# Patient Record
Sex: MALE | Race: WHITE | Employment: UNEMPLOYED | URBAN - METROPOLITAN AREA
[De-identification: names, ages, dates, MRNs, and addresses within clinical notes are randomized per-mention and may not be internally consistent; named-entity substitution may affect disease eponyms.]

---

## 2017-02-10 ENCOUNTER — HOSPITAL ENCOUNTER (EMERGENCY)
Age: 42
Discharge: HOME | End: 2017-02-11
Attending: EMERGENCY MEDICINE
Payer: MEDICARE

## 2017-02-10 DIAGNOSIS — F14.10 COCAINE ABUSE (HCC): ICD-10-CM

## 2017-02-10 DIAGNOSIS — F10.920 ACUTE ALCOHOLIC INTOXICATION, UNCOMPLICATED (HCC): Primary | ICD-10-CM

## 2017-02-10 LAB
ABSOLUTE EOS #: 0.1 K/UL (ref 0–0.4)
ABSOLUTE LYMPH #: 2.8 K/UL (ref 1–4.8)
ABSOLUTE MONO #: 0.8 K/UL (ref 0.1–1.3)
ACETAMINOPHEN LEVEL: <10 UG/ML (ref 10–30)
ALBUMIN SERPL-MCNC: 4.6 G/DL (ref 3.5–5.2)
ALBUMIN/GLOBULIN RATIO: ABNORMAL (ref 1–2.5)
ALP BLD-CCNC: 99 U/L (ref 40–129)
ALT SERPL-CCNC: 22 U/L (ref 5–41)
AMPHETAMINE SCREEN URINE: NEGATIVE
ANION GAP SERPL CALCULATED.3IONS-SCNC: 16 MMOL/L (ref 9–17)
AST SERPL-CCNC: 50 U/L
BARBITURATE SCREEN URINE: NEGATIVE
BASOPHILS # BLD: 1 % (ref 0–2)
BASOPHILS ABSOLUTE: 0.1 K/UL (ref 0–0.2)
BENZODIAZEPINE SCREEN, URINE: NEGATIVE
BILIRUB SERPL-MCNC: 0.34 MG/DL (ref 0.3–1.2)
BUN BLDV-MCNC: 8 MG/DL (ref 6–20)
BUN/CREAT BLD: ABNORMAL (ref 9–20)
BUPRENORPHINE URINE: ABNORMAL
CALCIUM SERPL-MCNC: 9.2 MG/DL (ref 8.6–10.4)
CANNABINOID SCREEN URINE: POSITIVE
CHLORIDE BLD-SCNC: 101 MMOL/L (ref 98–107)
CO2: 22 MMOL/L (ref 20–31)
COCAINE METABOLITE, URINE: POSITIVE
CREAT SERPL-MCNC: 0.74 MG/DL (ref 0.7–1.2)
DIFFERENTIAL TYPE: ABNORMAL
EOSINOPHILS RELATIVE PERCENT: 1 % (ref 0–4)
ETHANOL PERCENT: 0.29 %
ETHANOL: 290 MG/DL
GFR AFRICAN AMERICAN: >60 ML/MIN
GFR NON-AFRICAN AMERICAN: >60 ML/MIN
GFR SERPL CREATININE-BSD FRML MDRD: ABNORMAL ML/MIN/{1.73_M2}
GFR SERPL CREATININE-BSD FRML MDRD: ABNORMAL ML/MIN/{1.73_M2}
GLUCOSE BLD-MCNC: 122 MG/DL (ref 70–99)
HCT VFR BLD CALC: 46.7 % (ref 41–53)
HEMOGLOBIN: 15.9 G/DL (ref 13.5–17.5)
LYMPHOCYTES # BLD: 32 % (ref 24–44)
MCH RBC QN AUTO: 29.1 PG (ref 26–34)
MCHC RBC AUTO-ENTMCNC: 34.1 G/DL (ref 31–37)
MCV RBC AUTO: 85.5 FL (ref 80–100)
MDMA URINE: ABNORMAL
METHADONE SCREEN, URINE: NEGATIVE
METHAMPHETAMINE, URINE: ABNORMAL
MONOCYTES # BLD: 9 % (ref 1–7)
OPIATES, URINE: NEGATIVE
OXYCODONE SCREEN URINE: NEGATIVE
PDW BLD-RTO: 14.8 % (ref 11.5–14.9)
PHENCYCLIDINE, URINE: NEGATIVE
PLATELET # BLD: 188 K/UL (ref 150–450)
PLATELET ESTIMATE: ABNORMAL
PMV BLD AUTO: 8.5 FL (ref 6–12)
POTASSIUM SERPL-SCNC: 4.2 MMOL/L (ref 3.7–5.3)
PROPOXYPHENE, URINE: ABNORMAL
RBC # BLD: 5.46 M/UL (ref 4.5–5.9)
RBC # BLD: ABNORMAL 10*6/UL
SALICYLATE LEVEL: <1 MG/DL (ref 3–10)
SEG NEUTROPHILS: 57 % (ref 36–66)
SEGMENTED NEUTROPHILS ABSOLUTE COUNT: 5 K/UL (ref 1.3–9.1)
SODIUM BLD-SCNC: 139 MMOL/L (ref 135–144)
TEST INFORMATION: ABNORMAL
TOTAL PROTEIN: 8.5 G/DL (ref 6.4–8.3)
TRICYCLIC ANTIDEPRESSANTS, UR: ABNORMAL
WBC # BLD: 8.8 K/UL (ref 3.5–11)
WBC # BLD: ABNORMAL 10*3/UL

## 2017-02-10 PROCEDURE — 80053 COMPREHEN METABOLIC PANEL: CPT

## 2017-02-10 PROCEDURE — 99285 EMERGENCY DEPT VISIT HI MDM: CPT

## 2017-02-10 PROCEDURE — G0480 DRUG TEST DEF 1-7 CLASSES: HCPCS

## 2017-02-10 PROCEDURE — 80307 DRUG TEST PRSMV CHEM ANLYZR: CPT

## 2017-02-10 PROCEDURE — 36415 COLL VENOUS BLD VENIPUNCTURE: CPT

## 2017-02-10 PROCEDURE — 85025 COMPLETE CBC W/AUTO DIFF WBC: CPT

## 2017-02-10 RX ORDER — LORAZEPAM 1 MG/1
1 TABLET ORAL ONCE
Status: DISCONTINUED | OUTPATIENT
Start: 2017-02-10 | End: 2017-02-11 | Stop reason: HOSPADM

## 2017-02-10 ASSESSMENT — ENCOUNTER SYMPTOMS
ABDOMINAL PAIN: 0
GASTROINTESTINAL NEGATIVE: 1
EYES NEGATIVE: 1
RESPIRATORY NEGATIVE: 1
COUGH: 0
BACK PAIN: 0
SHORTNESS OF BREATH: 0

## 2017-02-11 VITALS
DIASTOLIC BLOOD PRESSURE: 90 MMHG | BODY MASS INDEX: 29.43 KG/M2 | TEMPERATURE: 97.8 F | HEART RATE: 96 BPM | RESPIRATION RATE: 16 BRPM | SYSTOLIC BLOOD PRESSURE: 140 MMHG | OXYGEN SATURATION: 96 % | WEIGHT: 211 LBS

## 2017-02-11 ASSESSMENT — SLEEP AND FATIGUE QUESTIONNAIRES
DIFFICULTY STAYING ASLEEP: YES
AVERAGE NUMBER OF SLEEP HOURS: 2
SLEEP PATTERN: DIFFICULTY FALLING ASLEEP;DISTURBED/INTERRUPTED SLEEP;INSOMNIA;RESTLESSNESS;EARLY AWAKENING
DIFFICULTY ARISING: NO
DO YOU USE A SLEEP AID: NO
DO YOU HAVE DIFFICULTY SLEEPING: YES
RESTFUL SLEEP: NO
DIFFICULTY FALLING ASLEEP: YES

## 2017-02-11 ASSESSMENT — LIFESTYLE VARIABLES: HISTORY_ALCOHOL_USE: YES

## 2017-03-07 ENCOUNTER — HOSPITAL ENCOUNTER (OUTPATIENT)
Age: 42
Discharge: HOME | End: 2017-03-07
Payer: MEDICARE

## 2017-03-07 ENCOUNTER — OFFICE VISIT (OUTPATIENT)
Dept: FAMILY MEDICINE CLINIC | Facility: CLINIC | Age: 42
End: 2017-03-07

## 2017-03-07 VITALS
DIASTOLIC BLOOD PRESSURE: 84 MMHG | BODY MASS INDEX: 31.05 KG/M2 | WEIGHT: 221.8 LBS | TEMPERATURE: 98.8 F | SYSTOLIC BLOOD PRESSURE: 124 MMHG | HEART RATE: 97 BPM | HEIGHT: 71 IN

## 2017-03-07 DIAGNOSIS — K21.9 GASTROESOPHAGEAL REFLUX DISEASE, ESOPHAGITIS PRESENCE NOT SPECIFIED: ICD-10-CM

## 2017-03-07 DIAGNOSIS — Z00.00 HEALTHY ADULT ON ROUTINE PHYSICAL EXAMINATION: ICD-10-CM

## 2017-03-07 DIAGNOSIS — F31.4 BIPOLAR 1 DISORDER, DEPRESSED, SEVERE (HCC): Primary | Chronic | ICD-10-CM

## 2017-03-07 DIAGNOSIS — F10.10 ALCOHOL ABUSE: ICD-10-CM

## 2017-03-07 DIAGNOSIS — G25.81 RESTLESS LEG SYNDROME: ICD-10-CM

## 2017-03-07 DIAGNOSIS — F33.2 SEVERE EPISODE OF RECURRENT MAJOR DEPRESSIVE DISORDER, WITHOUT PSYCHOTIC FEATURES (HCC): Chronic | ICD-10-CM

## 2017-03-07 DIAGNOSIS — E53.8 FOLATE DEFICIENCY: ICD-10-CM

## 2017-03-07 PROCEDURE — 99203 OFFICE O/P NEW LOW 30 MIN: CPT | Performed by: FAMILY MEDICINE

## 2017-03-07 PROCEDURE — 99214 OFFICE O/P EST MOD 30 MIN: CPT | Performed by: FAMILY MEDICINE

## 2017-03-07 RX ORDER — OMEPRAZOLE 20 MG/1
20 CAPSULE, DELAYED RELEASE ORAL DAILY
Qty: 30 CAPSULE | Refills: 0 | Status: SHIPPED | OUTPATIENT
Start: 2017-03-07 | End: 2017-09-12 | Stop reason: SDUPTHER

## 2017-03-07 RX ORDER — ROPINIROLE 1 MG/1
1 TABLET, FILM COATED ORAL NIGHTLY
Qty: 30 TABLET | Refills: 3 | Status: SHIPPED | OUTPATIENT
Start: 2017-03-07 | End: 2017-08-02 | Stop reason: SDUPTHER

## 2017-03-07 ASSESSMENT — ENCOUNTER SYMPTOMS
RESPIRATORY NEGATIVE: 1
EYES NEGATIVE: 1

## 2017-04-10 ENCOUNTER — HOSPITAL ENCOUNTER (OUTPATIENT)
Age: 42
Setting detail: SPECIMEN
Discharge: HOME | End: 2017-04-10
Payer: MEDICARE

## 2017-04-10 DIAGNOSIS — F10.10 ALCOHOL ABUSE: ICD-10-CM

## 2017-04-10 DIAGNOSIS — E53.8 FOLATE DEFICIENCY: ICD-10-CM

## 2017-04-10 DIAGNOSIS — F33.2 SEVERE EPISODE OF RECURRENT MAJOR DEPRESSIVE DISORDER, WITHOUT PSYCHOTIC FEATURES (HCC): Chronic | ICD-10-CM

## 2017-04-10 DIAGNOSIS — Z00.00 HEALTHY ADULT ON ROUTINE PHYSICAL EXAMINATION: ICD-10-CM

## 2017-04-10 LAB
ALBUMIN SERPL-MCNC: 4.2 G/DL (ref 3.5–5.2)
ALBUMIN/GLOBULIN RATIO: 1.1 (ref 1–2.5)
ALP BLD-CCNC: 107 U/L (ref 40–129)
ALT SERPL-CCNC: 13 U/L (ref 5–41)
ANION GAP SERPL CALCULATED.3IONS-SCNC: 16 MMOL/L (ref 9–17)
AST SERPL-CCNC: 23 U/L
BILIRUB SERPL-MCNC: 0.54 MG/DL (ref 0.3–1.2)
BUN BLDV-MCNC: 9 MG/DL (ref 6–20)
BUN/CREAT BLD: NORMAL (ref 9–20)
CALCIUM SERPL-MCNC: 8.9 MG/DL (ref 8.6–10.4)
CHLORIDE BLD-SCNC: 101 MMOL/L (ref 98–107)
CHOLESTEROL/HDL RATIO: 5.9
CHOLESTEROL: 213 MG/DL
CO2: 24 MMOL/L (ref 20–31)
CREAT SERPL-MCNC: 0.83 MG/DL (ref 0.7–1.2)
GFR AFRICAN AMERICAN: >60 ML/MIN
GFR NON-AFRICAN AMERICAN: >60 ML/MIN
GFR SERPL CREATININE-BSD FRML MDRD: NORMAL ML/MIN/{1.73_M2}
GFR SERPL CREATININE-BSD FRML MDRD: NORMAL ML/MIN/{1.73_M2}
GLUCOSE BLD-MCNC: 76 MG/DL (ref 70–99)
HCT VFR BLD CALC: 45.6 % (ref 41–53)
HDLC SERPL-MCNC: 36 MG/DL
HEMOGLOBIN: 15.2 G/DL (ref 13.5–17.5)
LDL CHOLESTEROL: 115 MG/DL (ref 0–130)
MCH RBC QN AUTO: 27.6 PG (ref 26–34)
MCHC RBC AUTO-ENTMCNC: 33.3 G/DL (ref 31–37)
MCV RBC AUTO: 82.7 FL (ref 80–100)
PDW BLD-RTO: 14.5 % (ref 12.5–15.4)
PLATELET # BLD: 216 K/UL (ref 140–450)
PMV BLD AUTO: 9.6 FL (ref 6–12)
POTASSIUM SERPL-SCNC: 4.5 MMOL/L (ref 3.7–5.3)
RBC # BLD: 5.51 M/UL (ref 4.5–5.9)
SODIUM BLD-SCNC: 141 MMOL/L (ref 135–144)
TOTAL PROTEIN: 7.9 G/DL (ref 6.4–8.3)
TRIGL SERPL-MCNC: 309 MG/DL
VLDLC SERPL CALC-MCNC: ABNORMAL MG/DL (ref 1–30)
WBC # BLD: 8.3 K/UL (ref 3.5–11)

## 2017-04-11 LAB
FOLATE: 10.2 NG/ML
VITAMIN B-12: 423 PG/ML (ref 211–946)

## 2017-04-12 ENCOUNTER — TELEPHONE (OUTPATIENT)
Dept: FAMILY MEDICINE CLINIC | Age: 42
End: 2017-04-12

## 2017-06-26 ENCOUNTER — OFFICE VISIT (OUTPATIENT)
Dept: FAMILY MEDICINE CLINIC | Age: 42
End: 2017-06-26
Payer: MEDICARE

## 2017-06-26 VITALS
BODY MASS INDEX: 28.28 KG/M2 | SYSTOLIC BLOOD PRESSURE: 130 MMHG | TEMPERATURE: 97.7 F | DIASTOLIC BLOOD PRESSURE: 88 MMHG | WEIGHT: 202.8 LBS | HEART RATE: 91 BPM

## 2017-06-26 DIAGNOSIS — F31.4 BIPOLAR 1 DISORDER, DEPRESSED, SEVERE (HCC): Chronic | ICD-10-CM

## 2017-06-26 DIAGNOSIS — F10.10 ALCOHOL ABUSE: ICD-10-CM

## 2017-06-26 DIAGNOSIS — R11.2 INTRACTABLE VOMITING WITH NAUSEA, UNSPECIFIED VOMITING TYPE: Primary | ICD-10-CM

## 2017-06-26 PROCEDURE — 99213 OFFICE O/P EST LOW 20 MIN: CPT | Performed by: STUDENT IN AN ORGANIZED HEALTH CARE EDUCATION/TRAINING PROGRAM

## 2017-06-26 RX ORDER — M-VIT,TX,IRON,MINS/CALC/FOLIC 27MG-0.4MG
1 TABLET ORAL DAILY
Qty: 30 TABLET | Refills: 0 | Status: SHIPPED | OUTPATIENT
Start: 2017-06-26 | End: 2017-09-26

## 2017-06-26 RX ORDER — LANOLIN ALCOHOL/MO/W.PET/CERES
100 CREAM (GRAM) TOPICAL DAILY
Qty: 30 TABLET | Refills: 0 | Status: SHIPPED | OUTPATIENT
Start: 2017-06-26 | End: 2017-09-26

## 2017-06-26 RX ORDER — FOLIC ACID 1 MG/1
1 TABLET ORAL DAILY
Qty: 30 TABLET | Refills: 0 | Status: SHIPPED | OUTPATIENT
Start: 2017-06-26 | End: 2017-09-26

## 2017-06-26 RX ORDER — ONDANSETRON 4 MG/1
4 TABLET, ORALLY DISINTEGRATING ORAL EVERY 8 HOURS PRN
Qty: 21 TABLET | Refills: 1 | Status: SHIPPED | OUTPATIENT
Start: 2017-06-26 | End: 2017-09-26

## 2017-06-26 ASSESSMENT — ENCOUNTER SYMPTOMS
DIARRHEA: 1
NAUSEA: 1
ABDOMINAL PAIN: 1
PHOTOPHOBIA: 0
VOMITING: 1
SHORTNESS OF BREATH: 0
WHEEZING: 0

## 2017-07-11 ENCOUNTER — OFFICE VISIT (OUTPATIENT)
Dept: FAMILY MEDICINE CLINIC | Age: 42
End: 2017-07-11
Payer: MEDICARE

## 2017-07-11 VITALS
BODY MASS INDEX: 28.11 KG/M2 | SYSTOLIC BLOOD PRESSURE: 170 MMHG | HEART RATE: 91 BPM | HEIGHT: 71 IN | DIASTOLIC BLOOD PRESSURE: 102 MMHG | WEIGHT: 200.8 LBS | TEMPERATURE: 97.9 F

## 2017-07-11 DIAGNOSIS — A60.00 RECURRENT GENITAL HERPES: ICD-10-CM

## 2017-07-11 DIAGNOSIS — A60.02 HERPES SIMPLEX INFECTION OF OTHER SITE OF MALE GENITAL ORGAN: Primary | ICD-10-CM

## 2017-07-11 PROCEDURE — 99212 OFFICE O/P EST SF 10 MIN: CPT | Performed by: STUDENT IN AN ORGANIZED HEALTH CARE EDUCATION/TRAINING PROGRAM

## 2017-07-11 RX ORDER — VALACYCLOVIR HYDROCHLORIDE 500 MG/1
500 TABLET, FILM COATED ORAL 2 TIMES DAILY
Qty: 6 TABLET | Refills: 1 | Status: SHIPPED | OUTPATIENT
Start: 2017-07-11 | End: 2017-07-14

## 2017-07-11 RX ORDER — VALACYCLOVIR HYDROCHLORIDE 1 G/1
1000 TABLET, FILM COATED ORAL DAILY
Qty: 30 TABLET | Refills: 5 | Status: SHIPPED | OUTPATIENT
Start: 2017-07-11 | End: 2018-03-20 | Stop reason: SDUPTHER

## 2017-08-02 DIAGNOSIS — G25.81 RESTLESS LEG SYNDROME: ICD-10-CM

## 2017-08-07 RX ORDER — ROPINIROLE 1 MG/1
TABLET, FILM COATED ORAL
Qty: 30 TABLET | Refills: 3 | Status: SHIPPED | OUTPATIENT
Start: 2017-08-07 | End: 2017-12-16 | Stop reason: SDUPTHER

## 2017-09-05 ENCOUNTER — APPOINTMENT (OUTPATIENT)
Dept: GENERAL RADIOLOGY | Age: 42
DRG: 315 | End: 2017-09-05
Payer: MEDICARE

## 2017-09-05 ENCOUNTER — HOSPITAL ENCOUNTER (EMERGENCY)
Age: 42
Discharge: HOME | DRG: 315 | End: 2017-09-05
Attending: EMERGENCY MEDICINE
Payer: MEDICARE

## 2017-09-05 VITALS
TEMPERATURE: 97.6 F | DIASTOLIC BLOOD PRESSURE: 88 MMHG | HEART RATE: 88 BPM | RESPIRATION RATE: 18 BRPM | SYSTOLIC BLOOD PRESSURE: 152 MMHG | WEIGHT: 198.41 LBS | BODY MASS INDEX: 27.78 KG/M2 | OXYGEN SATURATION: 98 % | HEIGHT: 71 IN

## 2017-09-05 DIAGNOSIS — M71.122 SEPTIC BURSITIS OF ELBOW, LEFT: Primary | ICD-10-CM

## 2017-09-05 LAB
ABSOLUTE EOS #: 0.1 K/UL (ref 0–0.4)
ABSOLUTE LYMPH #: 2.2 K/UL (ref 1–4.8)
ABSOLUTE MONO #: 1.4 K/UL (ref 0.2–0.8)
ANION GAP SERPL CALCULATED.3IONS-SCNC: 17 MMOL/L (ref 9–17)
BASOPHILS # BLD: 1 %
BASOPHILS ABSOLUTE: 0.1 K/UL (ref 0–0.2)
BUN BLDV-MCNC: 9 MG/DL (ref 6–20)
BUN/CREAT BLD: 15 (ref 9–20)
CALCIUM SERPL-MCNC: 8.8 MG/DL (ref 8.6–10.4)
CHLORIDE BLD-SCNC: 97 MMOL/L (ref 98–107)
CO2: 22 MMOL/L (ref 20–31)
CREAT SERPL-MCNC: 0.62 MG/DL (ref 0.7–1.2)
DIFFERENTIAL TYPE: ABNORMAL
EOSINOPHILS RELATIVE PERCENT: 1 %
GFR AFRICAN AMERICAN: >60 ML/MIN
GFR NON-AFRICAN AMERICAN: >60 ML/MIN
GFR SERPL CREATININE-BSD FRML MDRD: ABNORMAL ML/MIN/{1.73_M2}
GFR SERPL CREATININE-BSD FRML MDRD: ABNORMAL ML/MIN/{1.73_M2}
GLUCOSE BLD-MCNC: 136 MG/DL (ref 70–99)
HCT VFR BLD CALC: 42.1 % (ref 41–53)
HEMOGLOBIN: 14 G/DL (ref 13.5–17.5)
LYMPHOCYTES # BLD: 21 %
MCH RBC QN AUTO: 29.2 PG (ref 26–34)
MCHC RBC AUTO-ENTMCNC: 33.2 G/DL (ref 31–37)
MCV RBC AUTO: 87.9 FL (ref 80–100)
MONOCYTES # BLD: 14 %
PDW BLD-RTO: 15.6 % (ref 11.5–14.5)
PLATELET # BLD: 192 K/UL (ref 130–400)
PLATELET ESTIMATE: ABNORMAL
PMV BLD AUTO: 9.2 FL (ref 6–12)
POTASSIUM SERPL-SCNC: 4.6 MMOL/L (ref 3.7–5.3)
RBC # BLD: 4.78 M/UL (ref 4.5–5.9)
RBC # BLD: ABNORMAL 10*6/UL
SEG NEUTROPHILS: 63 %
SEGMENTED NEUTROPHILS ABSOLUTE COUNT: 6.6 K/UL (ref 1.8–7.7)
SODIUM BLD-SCNC: 136 MMOL/L (ref 135–144)
WBC # BLD: 10.4 K/UL (ref 3.5–11)
WBC # BLD: ABNORMAL 10*3/UL

## 2017-09-05 PROCEDURE — 73080 X-RAY EXAM OF ELBOW: CPT

## 2017-09-05 PROCEDURE — 85025 COMPLETE CBC W/AUTO DIFF WBC: CPT

## 2017-09-05 PROCEDURE — 96374 THER/PROPH/DIAG INJ IV PUSH: CPT

## 2017-09-05 PROCEDURE — 80048 BASIC METABOLIC PNL TOTAL CA: CPT

## 2017-09-05 PROCEDURE — 2580000003 HC RX 258: Performed by: NURSE PRACTITIONER

## 2017-09-05 PROCEDURE — 6360000002 HC RX W HCPCS: Performed by: NURSE PRACTITIONER

## 2017-09-05 PROCEDURE — 99283 EMERGENCY DEPT VISIT LOW MDM: CPT

## 2017-09-05 RX ORDER — CEPHALEXIN 250 MG/1
250 CAPSULE ORAL 4 TIMES DAILY
Qty: 40 CAPSULE | Refills: 0 | Status: ON HOLD | OUTPATIENT
Start: 2017-09-05 | End: 2017-09-10 | Stop reason: HOSPADM

## 2017-09-05 RX ORDER — OXYCODONE HYDROCHLORIDE AND ACETAMINOPHEN 5; 325 MG/1; MG/1
1 TABLET ORAL EVERY 6 HOURS PRN
Qty: 20 TABLET | Refills: 0 | Status: ON HOLD | OUTPATIENT
Start: 2017-09-05 | End: 2017-09-10 | Stop reason: HOSPADM

## 2017-09-05 RX ORDER — SULFAMETHOXAZOLE AND TRIMETHOPRIM 800; 160 MG/1; MG/1
1 TABLET ORAL 2 TIMES DAILY
Qty: 20 TABLET | Refills: 0 | Status: ON HOLD | OUTPATIENT
Start: 2017-09-05 | End: 2017-09-10 | Stop reason: HOSPADM

## 2017-09-05 RX ORDER — MORPHINE SULFATE 4 MG/ML
4 INJECTION, SOLUTION INTRAMUSCULAR; INTRAVENOUS ONCE
Status: COMPLETED | OUTPATIENT
Start: 2017-09-05 | End: 2017-09-05

## 2017-09-05 RX ORDER — SODIUM CHLORIDE 9 MG/ML
INJECTION, SOLUTION INTRAVENOUS CONTINUOUS
Status: DISCONTINUED | OUTPATIENT
Start: 2017-09-05 | End: 2017-09-05 | Stop reason: HOSPADM

## 2017-09-05 RX ADMIN — SODIUM CHLORIDE 100 ML/HR: 9 INJECTION, SOLUTION INTRAVENOUS at 11:00

## 2017-09-05 RX ADMIN — MORPHINE SULFATE 4 MG: 4 INJECTION, SOLUTION INTRAMUSCULAR; INTRAVENOUS at 11:00

## 2017-09-05 ASSESSMENT — PAIN DESCRIPTION - ORIENTATION: ORIENTATION: LEFT

## 2017-09-05 ASSESSMENT — PAIN DESCRIPTION - DESCRIPTORS
DESCRIPTORS: ACHING
DESCRIPTORS: ACHING;THROBBING

## 2017-09-05 ASSESSMENT — PAIN DESCRIPTION - LOCATION: LOCATION: ELBOW

## 2017-09-05 ASSESSMENT — PAIN SCALES - GENERAL
PAINLEVEL_OUTOF10: 10
PAINLEVEL_OUTOF10: 10
PAINLEVEL_OUTOF10: 5

## 2017-09-05 ASSESSMENT — ENCOUNTER SYMPTOMS
RESPIRATORY NEGATIVE: 1
GASTROINTESTINAL NEGATIVE: 1

## 2017-09-05 ASSESSMENT — PAIN DESCRIPTION - PAIN TYPE
TYPE: ACUTE PAIN
TYPE: ACUTE PAIN

## 2017-09-06 ENCOUNTER — HOSPITAL ENCOUNTER (INPATIENT)
Age: 42
LOS: 4 days | Discharge: HOME HEALTH | DRG: 315 | End: 2017-09-10
Attending: EMERGENCY MEDICINE | Admitting: INTERNAL MEDICINE
Payer: MEDICARE

## 2017-09-06 DIAGNOSIS — L03.119 CELLULITIS OF UPPER EXTREMITY, UNSPECIFIED LATERALITY: Primary | ICD-10-CM

## 2017-09-06 PROBLEM — L03.114 LEFT ARM CELLULITIS: Status: ACTIVE | Noted: 2017-09-06

## 2017-09-06 PROBLEM — M70.22 OLECRANON BURSITIS OF LEFT ELBOW: Status: ACTIVE | Noted: 2017-09-06

## 2017-09-06 LAB
ABSOLUTE EOS #: 0.1 K/UL (ref 0–0.4)
ABSOLUTE LYMPH #: 1.7 K/UL (ref 1–4.8)
ABSOLUTE MONO #: 1 K/UL (ref 0.2–0.8)
ANION GAP SERPL CALCULATED.3IONS-SCNC: 15 MMOL/L (ref 9–17)
BASOPHILS # BLD: 1 %
BASOPHILS ABSOLUTE: 0.1 K/UL (ref 0–0.2)
BUN BLDV-MCNC: 8 MG/DL (ref 6–20)
BUN/CREAT BLD: 13 (ref 9–20)
C-REACTIVE PROTEIN: 105.6 MG/L (ref 0–5)
CALCIUM SERPL-MCNC: 8.5 MG/DL (ref 8.6–10.4)
CHLORIDE BLD-SCNC: 99 MMOL/L (ref 98–107)
CO2: 24 MMOL/L (ref 20–31)
CREAT SERPL-MCNC: 0.6 MG/DL (ref 0.7–1.2)
DIFFERENTIAL TYPE: ABNORMAL
EOSINOPHILS RELATIVE PERCENT: 2 %
GFR AFRICAN AMERICAN: >60 ML/MIN
GFR NON-AFRICAN AMERICAN: >60 ML/MIN
GFR SERPL CREATININE-BSD FRML MDRD: ABNORMAL ML/MIN/{1.73_M2}
GFR SERPL CREATININE-BSD FRML MDRD: ABNORMAL ML/MIN/{1.73_M2}
GLUCOSE BLD-MCNC: 89 MG/DL (ref 70–99)
HCT VFR BLD CALC: 39.9 % (ref 41–53)
HEMOGLOBIN: 13.6 G/DL (ref 13.5–17.5)
LYMPHOCYTES # BLD: 19 %
MCH RBC QN AUTO: 29.8 PG (ref 26–34)
MCHC RBC AUTO-ENTMCNC: 34.2 G/DL (ref 31–37)
MCV RBC AUTO: 87 FL (ref 80–100)
MONOCYTES # BLD: 11 %
PDW BLD-RTO: 14.5 % (ref 11.5–14.5)
PLATELET # BLD: 195 K/UL (ref 130–400)
PLATELET ESTIMATE: ABNORMAL
PMV BLD AUTO: ABNORMAL FL (ref 6–12)
POTASSIUM SERPL-SCNC: 4.5 MMOL/L (ref 3.7–5.3)
RBC # BLD: 4.58 M/UL (ref 4.5–5.9)
RBC # BLD: ABNORMAL 10*6/UL
SEDIMENTATION RATE, ERYTHROCYTE: 32 MM (ref 0–15)
SEG NEUTROPHILS: 67 %
SEGMENTED NEUTROPHILS ABSOLUTE COUNT: 6.3 K/UL (ref 1.8–7.7)
SODIUM BLD-SCNC: 138 MMOL/L (ref 135–144)
WBC # BLD: 9.2 K/UL (ref 3.5–11)
WBC # BLD: ABNORMAL 10*3/UL

## 2017-09-06 PROCEDURE — 85025 COMPLETE CBC W/AUTO DIFF WBC: CPT

## 2017-09-06 PROCEDURE — 87040 BLOOD CULTURE FOR BACTERIA: CPT

## 2017-09-06 PROCEDURE — 80048 BASIC METABOLIC PNL TOTAL CA: CPT

## 2017-09-06 PROCEDURE — 36415 COLL VENOUS BLD VENIPUNCTURE: CPT

## 2017-09-06 PROCEDURE — 85651 RBC SED RATE NONAUTOMATED: CPT

## 2017-09-06 PROCEDURE — 6370000000 HC RX 637 (ALT 250 FOR IP): Performed by: INTERNAL MEDICINE

## 2017-09-06 PROCEDURE — 86140 C-REACTIVE PROTEIN: CPT

## 2017-09-06 PROCEDURE — 6360000002 HC RX W HCPCS: Performed by: EMERGENCY MEDICINE

## 2017-09-06 PROCEDURE — 99222 1ST HOSP IP/OBS MODERATE 55: CPT | Performed by: INTERNAL MEDICINE

## 2017-09-06 PROCEDURE — 99284 EMERGENCY DEPT VISIT MOD MDM: CPT

## 2017-09-06 PROCEDURE — 1200000000 HC SEMI PRIVATE

## 2017-09-06 PROCEDURE — 2580000003 HC RX 258: Performed by: EMERGENCY MEDICINE

## 2017-09-06 PROCEDURE — 2580000003 HC RX 258: Performed by: INTERNAL MEDICINE

## 2017-09-06 PROCEDURE — 6360000002 HC RX W HCPCS: Performed by: INTERNAL MEDICINE

## 2017-09-06 RX ORDER — BACLOFEN 10 MG/1
10 TABLET ORAL 2 TIMES DAILY
Status: DISCONTINUED | OUTPATIENT
Start: 2017-09-06 | End: 2017-09-06

## 2017-09-06 RX ORDER — ONDANSETRON 2 MG/ML
4 INJECTION INTRAMUSCULAR; INTRAVENOUS EVERY 6 HOURS PRN
Status: DISCONTINUED | OUTPATIENT
Start: 2017-09-06 | End: 2017-09-06 | Stop reason: SDUPTHER

## 2017-09-06 RX ORDER — ONDANSETRON 4 MG/1
4 TABLET, ORALLY DISINTEGRATING ORAL EVERY 8 HOURS PRN
Status: DISCONTINUED | OUTPATIENT
Start: 2017-09-06 | End: 2017-09-06 | Stop reason: SDUPTHER

## 2017-09-06 RX ORDER — ACYCLOVIR 200 MG/1
400 CAPSULE ORAL 3 TIMES DAILY
Status: DISCONTINUED | OUTPATIENT
Start: 2017-09-06 | End: 2017-09-06

## 2017-09-06 RX ORDER — POTASSIUM CHLORIDE 20MEQ/15ML
40 LIQUID (ML) ORAL PRN
Status: DISCONTINUED | OUTPATIENT
Start: 2017-09-06 | End: 2017-09-10 | Stop reason: HOSPADM

## 2017-09-06 RX ORDER — ONDANSETRON 4 MG/1
4 TABLET, ORALLY DISINTEGRATING ORAL EVERY 8 HOURS PRN
Status: DISCONTINUED | OUTPATIENT
Start: 2017-09-06 | End: 2017-09-10 | Stop reason: HOSPADM

## 2017-09-06 RX ORDER — ROPINIROLE 1 MG/1
1 TABLET, FILM COATED ORAL NIGHTLY
Status: DISCONTINUED | OUTPATIENT
Start: 2017-09-06 | End: 2017-09-10 | Stop reason: HOSPADM

## 2017-09-06 RX ORDER — BISACODYL 10 MG
10 SUPPOSITORY, RECTAL RECTAL DAILY PRN
Status: DISCONTINUED | OUTPATIENT
Start: 2017-09-06 | End: 2017-09-10 | Stop reason: HOSPADM

## 2017-09-06 RX ORDER — POTASSIUM CHLORIDE 7.45 MG/ML
10 INJECTION INTRAVENOUS PRN
Status: DISCONTINUED | OUTPATIENT
Start: 2017-09-06 | End: 2017-09-10 | Stop reason: HOSPADM

## 2017-09-06 RX ORDER — HYDROCODONE BITARTRATE AND ACETAMINOPHEN 5; 325 MG/1; MG/1
2 TABLET ORAL EVERY 4 HOURS PRN
Status: DISCONTINUED | OUTPATIENT
Start: 2017-09-06 | End: 2017-09-10 | Stop reason: HOSPADM

## 2017-09-06 RX ORDER — MORPHINE SULFATE 2 MG/ML
2 INJECTION, SOLUTION INTRAMUSCULAR; INTRAVENOUS
Status: DISCONTINUED | OUTPATIENT
Start: 2017-09-06 | End: 2017-09-10 | Stop reason: HOSPADM

## 2017-09-06 RX ORDER — M-VIT,TX,IRON,MINS/CALC/FOLIC 27MG-0.4MG
1 TABLET ORAL DAILY
Status: DISCONTINUED | OUTPATIENT
Start: 2017-09-06 | End: 2017-09-06

## 2017-09-06 RX ORDER — SODIUM CHLORIDE 0.9 % (FLUSH) 0.9 %
10 SYRINGE (ML) INJECTION EVERY 12 HOURS SCHEDULED
Status: DISCONTINUED | OUTPATIENT
Start: 2017-09-06 | End: 2017-09-10 | Stop reason: HOSPADM

## 2017-09-06 RX ORDER — MAGNESIUM SULFATE 1 G/100ML
1 INJECTION INTRAVENOUS PRN
Status: DISCONTINUED | OUTPATIENT
Start: 2017-09-06 | End: 2017-09-10 | Stop reason: HOSPADM

## 2017-09-06 RX ORDER — GABAPENTIN 100 MG/1
100 CAPSULE ORAL 3 TIMES DAILY
Status: DISCONTINUED | OUTPATIENT
Start: 2017-09-06 | End: 2017-09-06

## 2017-09-06 RX ORDER — FOLIC ACID 1 MG/1
1 TABLET ORAL DAILY
Status: DISCONTINUED | OUTPATIENT
Start: 2017-09-06 | End: 2017-09-10 | Stop reason: HOSPADM

## 2017-09-06 RX ORDER — NICOTINE 21 MG/24HR
1 PATCH, TRANSDERMAL 24 HOURS TRANSDERMAL DAILY
Status: DISCONTINUED | OUTPATIENT
Start: 2017-09-06 | End: 2017-09-10 | Stop reason: HOSPADM

## 2017-09-06 RX ORDER — SODIUM CHLORIDE 9 MG/ML
INJECTION, SOLUTION INTRAVENOUS CONTINUOUS
Status: DISCONTINUED | OUTPATIENT
Start: 2017-09-06 | End: 2017-09-06

## 2017-09-06 RX ORDER — THIAMINE MONONITRATE (VIT B1) 100 MG
100 TABLET ORAL DAILY
Status: DISCONTINUED | OUTPATIENT
Start: 2017-09-06 | End: 2017-09-10 | Stop reason: HOSPADM

## 2017-09-06 RX ORDER — POTASSIUM CHLORIDE 20 MEQ/1
40 TABLET, EXTENDED RELEASE ORAL PRN
Status: DISCONTINUED | OUTPATIENT
Start: 2017-09-06 | End: 2017-09-10 | Stop reason: HOSPADM

## 2017-09-06 RX ORDER — HYDROCODONE BITARTRATE AND ACETAMINOPHEN 5; 325 MG/1; MG/1
1 TABLET ORAL EVERY 4 HOURS PRN
Status: DISCONTINUED | OUTPATIENT
Start: 2017-09-06 | End: 2017-09-10 | Stop reason: HOSPADM

## 2017-09-06 RX ORDER — FOLIC ACID 1 MG/1
1 TABLET ORAL DAILY
Status: DISCONTINUED | OUTPATIENT
Start: 2017-09-06 | End: 2017-09-06

## 2017-09-06 RX ORDER — PANTOPRAZOLE SODIUM 40 MG/1
40 TABLET, DELAYED RELEASE ORAL
Status: DISCONTINUED | OUTPATIENT
Start: 2017-09-07 | End: 2017-09-10 | Stop reason: HOSPADM

## 2017-09-06 RX ORDER — THIAMINE MONONITRATE (VIT B1) 100 MG
100 TABLET ORAL DAILY
Status: DISCONTINUED | OUTPATIENT
Start: 2017-09-06 | End: 2017-09-06

## 2017-09-06 RX ORDER — ACETAMINOPHEN 325 MG/1
650 TABLET ORAL EVERY 4 HOURS PRN
Status: DISCONTINUED | OUTPATIENT
Start: 2017-09-06 | End: 2017-09-10 | Stop reason: HOSPADM

## 2017-09-06 RX ORDER — OXYCODONE HYDROCHLORIDE AND ACETAMINOPHEN 5; 325 MG/1; MG/1
1 TABLET ORAL EVERY 6 HOURS PRN
Status: DISCONTINUED | OUTPATIENT
Start: 2017-09-06 | End: 2017-09-06

## 2017-09-06 RX ORDER — MORPHINE SULFATE 4 MG/ML
4 INJECTION, SOLUTION INTRAMUSCULAR; INTRAVENOUS
Status: DISCONTINUED | OUTPATIENT
Start: 2017-09-06 | End: 2017-09-10 | Stop reason: HOSPADM

## 2017-09-06 RX ORDER — ONDANSETRON 2 MG/ML
4 INJECTION INTRAMUSCULAR; INTRAVENOUS EVERY 6 HOURS PRN
Status: DISCONTINUED | OUTPATIENT
Start: 2017-09-06 | End: 2017-09-10 | Stop reason: HOSPADM

## 2017-09-06 RX ORDER — SODIUM CHLORIDE 0.9 % (FLUSH) 0.9 %
10 SYRINGE (ML) INJECTION PRN
Status: DISCONTINUED | OUTPATIENT
Start: 2017-09-06 | End: 2017-09-10 | Stop reason: HOSPADM

## 2017-09-06 RX ADMIN — VANCOMYCIN HYDROCHLORIDE 1500 MG: 1 INJECTION, POWDER, LYOPHILIZED, FOR SOLUTION INTRAVENOUS at 12:06

## 2017-09-06 RX ADMIN — HYDROCODONE BITARTRATE AND ACETAMINOPHEN 2 TABLET: 5; 325 TABLET ORAL at 17:47

## 2017-09-06 RX ADMIN — HYDROCODONE BITARTRATE AND ACETAMINOPHEN 2 TABLET: 5; 325 TABLET ORAL at 22:19

## 2017-09-06 RX ADMIN — SODIUM CHLORIDE: 9 INJECTION, SOLUTION INTRAVENOUS at 12:46

## 2017-09-06 RX ADMIN — ENOXAPARIN SODIUM 40 MG: 40 INJECTION SUBCUTANEOUS at 12:50

## 2017-09-06 RX ADMIN — HYDROCODONE BITARTRATE AND ACETAMINOPHEN 2 TABLET: 5; 325 TABLET ORAL at 12:50

## 2017-09-06 RX ADMIN — ROPINIROLE HYDROCHLORIDE 1 MG: 1 TABLET, FILM COATED ORAL at 21:15

## 2017-09-06 ASSESSMENT — PAIN SCALES - GENERAL
PAINLEVEL_OUTOF10: 7
PAINLEVEL_OUTOF10: 5
PAINLEVEL_OUTOF10: 5
PAINLEVEL_OUTOF10: 7
PAINLEVEL_OUTOF10: 7
PAINLEVEL_OUTOF10: 5

## 2017-09-06 ASSESSMENT — PAIN DESCRIPTION - ONSET
ONSET: ON-GOING
ONSET: ON-GOING

## 2017-09-06 ASSESSMENT — PAIN DESCRIPTION - DESCRIPTORS
DESCRIPTORS: ACHING
DESCRIPTORS: ACHING;CONSTANT

## 2017-09-06 ASSESSMENT — PAIN DESCRIPTION - PROGRESSION
CLINICAL_PROGRESSION: NOT CHANGED
CLINICAL_PROGRESSION: NOT CHANGED

## 2017-09-06 ASSESSMENT — PAIN DESCRIPTION - PAIN TYPE
TYPE: ACUTE PAIN
TYPE: ACUTE PAIN

## 2017-09-06 ASSESSMENT — PAIN DESCRIPTION - LOCATION
LOCATION: ELBOW;ARM
LOCATION: ELBOW
LOCATION: ELBOW

## 2017-09-06 ASSESSMENT — PAIN DESCRIPTION - FREQUENCY
FREQUENCY: CONTINUOUS
FREQUENCY: CONTINUOUS

## 2017-09-06 ASSESSMENT — PAIN DESCRIPTION - ORIENTATION
ORIENTATION: LEFT
ORIENTATION: LEFT

## 2017-09-07 LAB
ABSOLUTE EOS #: 0.2 K/UL (ref 0–0.4)
ABSOLUTE LYMPH #: 1.5 K/UL (ref 1–4.8)
ABSOLUTE MONO #: 0.8 K/UL (ref 0.2–0.8)
ALBUMIN SERPL-MCNC: 4 G/DL (ref 3.5–5.2)
ALBUMIN/GLOBULIN RATIO: ABNORMAL (ref 1–2.5)
ALP BLD-CCNC: 103 U/L (ref 40–129)
ALT SERPL-CCNC: 20 U/L (ref 5–41)
ANION GAP SERPL CALCULATED.3IONS-SCNC: 11 MMOL/L (ref 9–17)
AST SERPL-CCNC: 32 U/L
BASOPHILS # BLD: 0 %
BASOPHILS ABSOLUTE: 0 K/UL (ref 0–0.2)
BILIRUB SERPL-MCNC: 0.81 MG/DL (ref 0.3–1.2)
BUN BLDV-MCNC: 7 MG/DL (ref 6–20)
BUN/CREAT BLD: 12 (ref 9–20)
CALCIUM SERPL-MCNC: 9 MG/DL (ref 8.6–10.4)
CHLORIDE BLD-SCNC: 101 MMOL/L (ref 98–107)
CO2: 27 MMOL/L (ref 20–31)
CREAT SERPL-MCNC: 0.6 MG/DL (ref 0.7–1.2)
CULTURE: NORMAL
DIFFERENTIAL TYPE: ABNORMAL
DIRECT EXAM: NORMAL
DIRECT EXAM: NORMAL
EOSINOPHILS RELATIVE PERCENT: 3 %
GFR AFRICAN AMERICAN: >60 ML/MIN
GFR NON-AFRICAN AMERICAN: >60 ML/MIN
GFR SERPL CREATININE-BSD FRML MDRD: ABNORMAL ML/MIN/{1.73_M2}
GFR SERPL CREATININE-BSD FRML MDRD: ABNORMAL ML/MIN/{1.73_M2}
GLUCOSE BLD-MCNC: 87 MG/DL (ref 70–99)
HCT VFR BLD CALC: 43.1 % (ref 41–53)
HEMOGLOBIN: 14.6 G/DL (ref 13.5–17.5)
LYMPHOCYTES # BLD: 20 %
Lab: NORMAL
Lab: NORMAL
MCH RBC QN AUTO: 29.8 PG (ref 26–34)
MCHC RBC AUTO-ENTMCNC: 33.8 G/DL (ref 31–37)
MCV RBC AUTO: 88.2 FL (ref 80–100)
MONOCYTES # BLD: 10 %
PDW BLD-RTO: 14.9 % (ref 11.5–14.5)
PLATELET # BLD: 202 K/UL (ref 130–400)
PLATELET ESTIMATE: ABNORMAL
PMV BLD AUTO: 8.9 FL (ref 6–12)
POTASSIUM SERPL-SCNC: 4.6 MMOL/L (ref 3.7–5.3)
RBC # BLD: 4.88 M/UL (ref 4.5–5.9)
RBC # BLD: ABNORMAL 10*6/UL
SEG NEUTROPHILS: 67 %
SEGMENTED NEUTROPHILS ABSOLUTE COUNT: 5 K/UL (ref 1.8–7.7)
SODIUM BLD-SCNC: 139 MMOL/L (ref 135–144)
SPECIMEN DESCRIPTION: NORMAL
STATUS: NORMAL
STATUS: NORMAL
TOTAL PROTEIN: 7.4 G/DL (ref 6.4–8.3)
VANCOMYCIN TROUGH DATE LAST DOSE: ABNORMAL
VANCOMYCIN TROUGH DOSE AMOUNT: ABNORMAL
VANCOMYCIN TROUGH TIME LAST DOSE: ABNORMAL
VANCOMYCIN TROUGH: 5.7 UG/ML (ref 10–20)
WBC # BLD: 7.6 K/UL (ref 3.5–11)
WBC # BLD: ABNORMAL 10*3/UL

## 2017-09-07 PROCEDURE — 87205 SMEAR GRAM STAIN: CPT

## 2017-09-07 PROCEDURE — 80202 ASSAY OF VANCOMYCIN: CPT

## 2017-09-07 PROCEDURE — 80053 COMPREHEN METABOLIC PANEL: CPT

## 2017-09-07 PROCEDURE — 10060 I&D ABSCESS SIMPLE/SINGLE: CPT

## 2017-09-07 PROCEDURE — 2580000003 HC RX 258: Performed by: INTERNAL MEDICINE

## 2017-09-07 PROCEDURE — 6360000002 HC RX W HCPCS: Performed by: INTERNAL MEDICINE

## 2017-09-07 PROCEDURE — 1200000000 HC SEMI PRIVATE

## 2017-09-07 PROCEDURE — 87075 CULTR BACTERIA EXCEPT BLOOD: CPT

## 2017-09-07 PROCEDURE — 99232 SBSQ HOSP IP/OBS MODERATE 35: CPT | Performed by: INTERNAL MEDICINE

## 2017-09-07 PROCEDURE — 85025 COMPLETE CBC W/AUTO DIFF WBC: CPT

## 2017-09-07 PROCEDURE — 2500000003 HC RX 250 WO HCPCS

## 2017-09-07 PROCEDURE — 6370000000 HC RX 637 (ALT 250 FOR IP): Performed by: INTERNAL MEDICINE

## 2017-09-07 PROCEDURE — 87070 CULTURE OTHR SPECIMN AEROBIC: CPT

## 2017-09-07 PROCEDURE — 86403 PARTICLE AGGLUT ANTBDY SCRN: CPT

## 2017-09-07 PROCEDURE — 36415 COLL VENOUS BLD VENIPUNCTURE: CPT

## 2017-09-07 PROCEDURE — 87186 SC STD MICRODIL/AGAR DIL: CPT

## 2017-09-07 PROCEDURE — 0M940ZZ DRAINAGE OF LEFT ELBOW BURSA AND LIGAMENT, OPEN APPROACH: ICD-10-PCS | Performed by: ORTHOPAEDIC SURGERY

## 2017-09-07 RX ORDER — LIDOCAINE HYDROCHLORIDE 10 MG/ML
INJECTION, SOLUTION EPIDURAL; INFILTRATION; INTRACAUDAL; PERINEURAL
Status: COMPLETED
Start: 2017-09-07 | End: 2017-09-07

## 2017-09-07 RX ORDER — LIDOCAINE HYDROCHLORIDE 10 MG/ML
10 INJECTION, SOLUTION INFILTRATION; PERINEURAL ONCE
Status: COMPLETED | OUTPATIENT
Start: 2017-09-07 | End: 2017-09-07

## 2017-09-07 RX ORDER — LIDOCAINE HYDROCHLORIDE 10 MG/ML
5 INJECTION, SOLUTION INFILTRATION; PERINEURAL ONCE
Status: COMPLETED | OUTPATIENT
Start: 2017-09-07 | End: 2017-09-07

## 2017-09-07 RX ADMIN — HYDROCODONE BITARTRATE AND ACETAMINOPHEN 2 TABLET: 5; 325 TABLET ORAL at 02:42

## 2017-09-07 RX ADMIN — PANTOPRAZOLE SODIUM 40 MG: 40 TABLET, DELAYED RELEASE ORAL at 05:58

## 2017-09-07 RX ADMIN — LIDOCAINE HYDROCHLORIDE 10 ML: 10 INJECTION, SOLUTION INFILTRATION; PERINEURAL at 17:43

## 2017-09-07 RX ADMIN — HYDROCODONE BITARTRATE AND ACETAMINOPHEN 2 TABLET: 5; 325 TABLET ORAL at 23:58

## 2017-09-07 RX ADMIN — Medication 10 ML: at 12:16

## 2017-09-07 RX ADMIN — VANCOMYCIN HYDROCHLORIDE 1500 MG: 1 INJECTION, POWDER, LYOPHILIZED, FOR SOLUTION INTRAVENOUS at 12:15

## 2017-09-07 RX ADMIN — HYDROCODONE BITARTRATE AND ACETAMINOPHEN 2 TABLET: 5; 325 TABLET ORAL at 19:57

## 2017-09-07 RX ADMIN — VANCOMYCIN HYDROCHLORIDE 1500 MG: 1 INJECTION, POWDER, LYOPHILIZED, FOR SOLUTION INTRAVENOUS at 00:27

## 2017-09-07 RX ADMIN — VANCOMYCIN HYDROCHLORIDE 1500 MG: 1 INJECTION, POWDER, LYOPHILIZED, FOR SOLUTION INTRAVENOUS at 23:55

## 2017-09-07 RX ADMIN — LIDOCAINE HYDROCHLORIDE 5 ML: 10 INJECTION, SOLUTION INFILTRATION; PERINEURAL at 17:21

## 2017-09-07 RX ADMIN — Medication 10 ML: at 19:57

## 2017-09-07 RX ADMIN — HYDROCODONE BITARTRATE AND ACETAMINOPHEN 2 TABLET: 5; 325 TABLET ORAL at 15:20

## 2017-09-07 RX ADMIN — HYDROCODONE BITARTRATE AND ACETAMINOPHEN 2 TABLET: 5; 325 TABLET ORAL at 10:35

## 2017-09-07 RX ADMIN — ROPINIROLE HYDROCHLORIDE 1 MG: 1 TABLET, FILM COATED ORAL at 22:20

## 2017-09-07 RX ADMIN — LIDOCAINE HYDROCHLORIDE 5 ML: 10 INJECTION, SOLUTION EPIDURAL; INFILTRATION; INTRACAUDAL; PERINEURAL at 17:21

## 2017-09-07 RX ADMIN — HYDROCODONE BITARTRATE AND ACETAMINOPHEN 2 TABLET: 5; 325 TABLET ORAL at 05:58

## 2017-09-07 RX ADMIN — LIDOCAINE HYDROCHLORIDE 10 ML: 10 INJECTION, SOLUTION EPIDURAL; INFILTRATION; INTRACAUDAL; PERINEURAL at 17:43

## 2017-09-07 RX ADMIN — MORPHINE SULFATE 4 MG: 4 INJECTION, SOLUTION INTRAMUSCULAR; INTRAVENOUS at 18:26

## 2017-09-07 ASSESSMENT — PAIN SCALES - GENERAL
PAINLEVEL_OUTOF10: 8
PAINLEVEL_OUTOF10: 6
PAINLEVEL_OUTOF10: 8
PAINLEVEL_OUTOF10: 7
PAINLEVEL_OUTOF10: 7
PAINLEVEL_OUTOF10: 10
PAINLEVEL_OUTOF10: 7
PAINLEVEL_OUTOF10: 4
PAINLEVEL_OUTOF10: 7
PAINLEVEL_OUTOF10: 8
PAINLEVEL_OUTOF10: 7
PAINLEVEL_OUTOF10: 5
PAINLEVEL_OUTOF10: 10

## 2017-09-07 ASSESSMENT — PAIN DESCRIPTION - DESCRIPTORS
DESCRIPTORS: ACHING

## 2017-09-07 ASSESSMENT — PAIN DESCRIPTION - ONSET
ONSET: ON-GOING

## 2017-09-07 ASSESSMENT — PAIN DESCRIPTION - FREQUENCY
FREQUENCY: CONTINUOUS

## 2017-09-07 ASSESSMENT — PAIN DESCRIPTION - PROGRESSION
CLINICAL_PROGRESSION: NOT CHANGED

## 2017-09-07 ASSESSMENT — PAIN DESCRIPTION - ORIENTATION
ORIENTATION: LEFT

## 2017-09-07 ASSESSMENT — PAIN DESCRIPTION - LOCATION
LOCATION: ARM;ELBOW
LOCATION: ELBOW
LOCATION: ELBOW
LOCATION: ARM;ELBOW
LOCATION: ARM;ELBOW

## 2017-09-07 ASSESSMENT — PAIN DESCRIPTION - PAIN TYPE
TYPE: ACUTE PAIN
TYPE: ACUTE PAIN
TYPE: ACUTE PAIN;SURGICAL PAIN
TYPE: ACUTE PAIN
TYPE: ACUTE PAIN

## 2017-09-08 PROBLEM — O99.310 ALCOHOL USE AFFECTING PREGNANCY: Status: RESOLVED | Noted: 2017-09-06 | Resolved: 2017-09-08

## 2017-09-08 LAB
ABSOLUTE EOS #: 0.2 K/UL (ref 0–0.4)
ABSOLUTE LYMPH #: 1.5 K/UL (ref 1–4.8)
ABSOLUTE MONO #: 1 K/UL (ref 0.2–0.8)
ANION GAP SERPL CALCULATED.3IONS-SCNC: 12 MMOL/L (ref 9–17)
BASOPHILS # BLD: 1 %
BASOPHILS ABSOLUTE: 0 K/UL (ref 0–0.2)
BUN BLDV-MCNC: 7 MG/DL (ref 6–20)
BUN/CREAT BLD: 12 (ref 9–20)
CALCIUM SERPL-MCNC: 9.3 MG/DL (ref 8.6–10.4)
CHLORIDE BLD-SCNC: 99 MMOL/L (ref 98–107)
CO2: 28 MMOL/L (ref 20–31)
CREAT SERPL-MCNC: 0.57 MG/DL (ref 0.7–1.2)
DIFFERENTIAL TYPE: ABNORMAL
EOSINOPHILS RELATIVE PERCENT: 3 %
GFR AFRICAN AMERICAN: >60 ML/MIN
GFR NON-AFRICAN AMERICAN: >60 ML/MIN
GFR SERPL CREATININE-BSD FRML MDRD: ABNORMAL ML/MIN/{1.73_M2}
GFR SERPL CREATININE-BSD FRML MDRD: ABNORMAL ML/MIN/{1.73_M2}
GLUCOSE BLD-MCNC: 95 MG/DL (ref 70–99)
HCT VFR BLD CALC: 44.8 % (ref 41–53)
HEMOGLOBIN: 15 G/DL (ref 13.5–17.5)
LYMPHOCYTES # BLD: 22 %
MAGNESIUM: 2.1 MG/DL (ref 1.6–2.6)
MCH RBC QN AUTO: 29.6 PG (ref 26–34)
MCHC RBC AUTO-ENTMCNC: 33.5 G/DL (ref 31–37)
MCV RBC AUTO: 88.2 FL (ref 80–100)
MONOCYTES # BLD: 15 %
PDW BLD-RTO: 15.1 % (ref 11.5–14.5)
PLATELET # BLD: 235 K/UL (ref 130–400)
PLATELET ESTIMATE: ABNORMAL
PMV BLD AUTO: 8.9 FL (ref 6–12)
POTASSIUM SERPL-SCNC: 4.6 MMOL/L (ref 3.7–5.3)
RBC # BLD: 5.08 M/UL (ref 4.5–5.9)
RBC # BLD: ABNORMAL 10*6/UL
SEG NEUTROPHILS: 59 %
SEGMENTED NEUTROPHILS ABSOLUTE COUNT: 4 K/UL (ref 1.8–7.7)
SODIUM BLD-SCNC: 139 MMOL/L (ref 135–144)
WBC # BLD: 6.7 K/UL (ref 3.5–11)
WBC # BLD: ABNORMAL 10*3/UL

## 2017-09-08 PROCEDURE — 99232 SBSQ HOSP IP/OBS MODERATE 35: CPT | Performed by: INTERNAL MEDICINE

## 2017-09-08 PROCEDURE — 2580000003 HC RX 258: Performed by: INTERNAL MEDICINE

## 2017-09-08 PROCEDURE — 83735 ASSAY OF MAGNESIUM: CPT

## 2017-09-08 PROCEDURE — 1200000000 HC SEMI PRIVATE

## 2017-09-08 PROCEDURE — 80048 BASIC METABOLIC PNL TOTAL CA: CPT

## 2017-09-08 PROCEDURE — 6370000000 HC RX 637 (ALT 250 FOR IP): Performed by: INTERNAL MEDICINE

## 2017-09-08 PROCEDURE — 36415 COLL VENOUS BLD VENIPUNCTURE: CPT

## 2017-09-08 PROCEDURE — 85025 COMPLETE CBC W/AUTO DIFF WBC: CPT

## 2017-09-08 PROCEDURE — 6360000002 HC RX W HCPCS: Performed by: INTERNAL MEDICINE

## 2017-09-08 RX ADMIN — Medication 10 ML: at 20:01

## 2017-09-08 RX ADMIN — MORPHINE SULFATE 4 MG: 4 INJECTION, SOLUTION INTRAMUSCULAR; INTRAVENOUS at 20:17

## 2017-09-08 RX ADMIN — ROPINIROLE HYDROCHLORIDE 1 MG: 1 TABLET, FILM COATED ORAL at 21:33

## 2017-09-08 RX ADMIN — MORPHINE SULFATE 4 MG: 4 INJECTION, SOLUTION INTRAMUSCULAR; INTRAVENOUS at 08:41

## 2017-09-08 RX ADMIN — HYDROCODONE BITARTRATE AND ACETAMINOPHEN 2 TABLET: 5; 325 TABLET ORAL at 13:08

## 2017-09-08 RX ADMIN — Medication 10 ML: at 08:41

## 2017-09-08 RX ADMIN — HYDROCODONE BITARTRATE AND ACETAMINOPHEN 2 TABLET: 5; 325 TABLET ORAL at 21:33

## 2017-09-08 RX ADMIN — VANCOMYCIN HYDROCHLORIDE 1500 MG: 1 INJECTION, POWDER, LYOPHILIZED, FOR SOLUTION INTRAVENOUS at 08:45

## 2017-09-08 RX ADMIN — VANCOMYCIN HYDROCHLORIDE 1500 MG: 1 INJECTION, POWDER, LYOPHILIZED, FOR SOLUTION INTRAVENOUS at 20:01

## 2017-09-08 RX ADMIN — HYDROCODONE BITARTRATE AND ACETAMINOPHEN 2 TABLET: 5; 325 TABLET ORAL at 05:15

## 2017-09-08 RX ADMIN — HYDROCODONE BITARTRATE AND ACETAMINOPHEN 2 TABLET: 5; 325 TABLET ORAL at 17:14

## 2017-09-08 RX ADMIN — PANTOPRAZOLE SODIUM 40 MG: 40 TABLET, DELAYED RELEASE ORAL at 06:08

## 2017-09-08 RX ADMIN — HYDROCODONE BITARTRATE AND ACETAMINOPHEN 2 TABLET: 5; 325 TABLET ORAL at 09:31

## 2017-09-08 ASSESSMENT — PAIN DESCRIPTION - PAIN TYPE
TYPE: ACUTE PAIN

## 2017-09-08 ASSESSMENT — PAIN DESCRIPTION - FREQUENCY
FREQUENCY: CONTINUOUS

## 2017-09-08 ASSESSMENT — PAIN DESCRIPTION - ONSET
ONSET: ON-GOING

## 2017-09-08 ASSESSMENT — PAIN SCALES - GENERAL
PAINLEVEL_OUTOF10: 8
PAINLEVEL_OUTOF10: 8
PAINLEVEL_OUTOF10: 5
PAINLEVEL_OUTOF10: 6
PAINLEVEL_OUTOF10: 5
PAINLEVEL_OUTOF10: 4
PAINLEVEL_OUTOF10: 7
PAINLEVEL_OUTOF10: 0
PAINLEVEL_OUTOF10: 8
PAINLEVEL_OUTOF10: 5
PAINLEVEL_OUTOF10: 7
PAINLEVEL_OUTOF10: 8
PAINLEVEL_OUTOF10: 5
PAINLEVEL_OUTOF10: 6
PAINLEVEL_OUTOF10: 8

## 2017-09-08 ASSESSMENT — PAIN DESCRIPTION - DESCRIPTORS
DESCRIPTORS: ACHING

## 2017-09-08 ASSESSMENT — PAIN DESCRIPTION - PROGRESSION
CLINICAL_PROGRESSION: NOT CHANGED
CLINICAL_PROGRESSION: GRADUALLY IMPROVING

## 2017-09-08 ASSESSMENT — PAIN DESCRIPTION - LOCATION
LOCATION: ELBOW
LOCATION: ARM;ELBOW
LOCATION: ELBOW

## 2017-09-08 ASSESSMENT — PAIN DESCRIPTION - ORIENTATION
ORIENTATION: LEFT

## 2017-09-09 LAB
VANCOMYCIN TROUGH DATE LAST DOSE: ABNORMAL
VANCOMYCIN TROUGH DOSE AMOUNT: ABNORMAL
VANCOMYCIN TROUGH TIME LAST DOSE: ABNORMAL
VANCOMYCIN TROUGH: 22.5 UG/ML (ref 10–20)

## 2017-09-09 PROCEDURE — 36415 COLL VENOUS BLD VENIPUNCTURE: CPT

## 2017-09-09 PROCEDURE — 6370000000 HC RX 637 (ALT 250 FOR IP): Performed by: INTERNAL MEDICINE

## 2017-09-09 PROCEDURE — 2580000003 HC RX 258: Performed by: INTERNAL MEDICINE

## 2017-09-09 PROCEDURE — 1200000000 HC SEMI PRIVATE

## 2017-09-09 PROCEDURE — 80202 ASSAY OF VANCOMYCIN: CPT

## 2017-09-09 PROCEDURE — 6360000002 HC RX W HCPCS: Performed by: INTERNAL MEDICINE

## 2017-09-09 PROCEDURE — 99232 SBSQ HOSP IP/OBS MODERATE 35: CPT | Performed by: NURSE PRACTITIONER

## 2017-09-09 RX ADMIN — ROPINIROLE HYDROCHLORIDE 1 MG: 1 TABLET, FILM COATED ORAL at 21:16

## 2017-09-09 RX ADMIN — HYDROCODONE BITARTRATE AND ACETAMINOPHEN 2 TABLET: 5; 325 TABLET ORAL at 21:16

## 2017-09-09 RX ADMIN — MORPHINE SULFATE 2 MG: 2 INJECTION, SOLUTION INTRAMUSCULAR; INTRAVENOUS at 09:27

## 2017-09-09 RX ADMIN — VANCOMYCIN HYDROCHLORIDE 1500 MG: 1 INJECTION, POWDER, LYOPHILIZED, FOR SOLUTION INTRAVENOUS at 02:27

## 2017-09-09 RX ADMIN — VANCOMYCIN HYDROCHLORIDE 1250 MG: 5 INJECTION, POWDER, LYOPHILIZED, FOR SOLUTION INTRAVENOUS at 17:36

## 2017-09-09 RX ADMIN — Medication 10 ML: at 09:29

## 2017-09-09 RX ADMIN — HYDROCODONE BITARTRATE AND ACETAMINOPHEN 2 TABLET: 5; 325 TABLET ORAL at 06:24

## 2017-09-09 RX ADMIN — PANTOPRAZOLE SODIUM 40 MG: 40 TABLET, DELAYED RELEASE ORAL at 06:24

## 2017-09-09 RX ADMIN — HYDROCODONE BITARTRATE AND ACETAMINOPHEN 2 TABLET: 5; 325 TABLET ORAL at 15:49

## 2017-09-09 RX ADMIN — VANCOMYCIN HYDROCHLORIDE 1250 MG: 5 INJECTION, POWDER, LYOPHILIZED, FOR SOLUTION INTRAVENOUS at 09:28

## 2017-09-09 RX ADMIN — HYDROCODONE BITARTRATE AND ACETAMINOPHEN 2 TABLET: 5; 325 TABLET ORAL at 11:25

## 2017-09-09 RX ADMIN — MORPHINE SULFATE 2 MG: 2 INJECTION, SOLUTION INTRAMUSCULAR; INTRAVENOUS at 19:19

## 2017-09-09 RX ADMIN — HYDROCODONE BITARTRATE AND ACETAMINOPHEN 2 TABLET: 5; 325 TABLET ORAL at 02:27

## 2017-09-09 ASSESSMENT — PAIN DESCRIPTION - LOCATION
LOCATION: ELBOW

## 2017-09-09 ASSESSMENT — PAIN SCALES - GENERAL
PAINLEVEL_OUTOF10: 6
PAINLEVEL_OUTOF10: 3
PAINLEVEL_OUTOF10: 8
PAINLEVEL_OUTOF10: 4
PAINLEVEL_OUTOF10: 8
PAINLEVEL_OUTOF10: 0
PAINLEVEL_OUTOF10: 3
PAINLEVEL_OUTOF10: 7
PAINLEVEL_OUTOF10: 8

## 2017-09-09 ASSESSMENT — PAIN DESCRIPTION - DESCRIPTORS
DESCRIPTORS: ACHING
DESCRIPTORS: SHARP;ACHING
DESCRIPTORS: ACHING
DESCRIPTORS: SHARP
DESCRIPTORS: ACHING
DESCRIPTORS: ACHING

## 2017-09-09 ASSESSMENT — PAIN DESCRIPTION - PROGRESSION
CLINICAL_PROGRESSION: GRADUALLY IMPROVING

## 2017-09-09 ASSESSMENT — PAIN DESCRIPTION - ONSET
ONSET: ON-GOING

## 2017-09-09 ASSESSMENT — PAIN DESCRIPTION - PAIN TYPE
TYPE: ACUTE PAIN

## 2017-09-09 ASSESSMENT — PAIN DESCRIPTION - ORIENTATION
ORIENTATION: LEFT

## 2017-09-09 ASSESSMENT — PAIN DESCRIPTION - FREQUENCY
FREQUENCY: CONTINUOUS

## 2017-09-10 VITALS
HEIGHT: 71 IN | SYSTOLIC BLOOD PRESSURE: 137 MMHG | OXYGEN SATURATION: 98 % | WEIGHT: 198 LBS | DIASTOLIC BLOOD PRESSURE: 90 MMHG | RESPIRATION RATE: 18 BRPM | TEMPERATURE: 97.9 F | BODY MASS INDEX: 27.72 KG/M2 | HEART RATE: 81 BPM

## 2017-09-10 LAB
CULTURE: ABNORMAL
DIRECT EXAM: ABNORMAL
Lab: ABNORMAL
ORGANISM: ABNORMAL
SPECIMEN DESCRIPTION: ABNORMAL
STATUS: ABNORMAL
VANCOMYCIN TROUGH DATE LAST DOSE: NORMAL
VANCOMYCIN TROUGH DOSE AMOUNT: NORMAL
VANCOMYCIN TROUGH TIME LAST DOSE: NORMAL
VANCOMYCIN TROUGH: 10.7 UG/ML (ref 10–20)

## 2017-09-10 PROCEDURE — 36415 COLL VENOUS BLD VENIPUNCTURE: CPT

## 2017-09-10 PROCEDURE — 6360000002 HC RX W HCPCS: Performed by: INTERNAL MEDICINE

## 2017-09-10 PROCEDURE — 80202 ASSAY OF VANCOMYCIN: CPT

## 2017-09-10 PROCEDURE — 6370000000 HC RX 637 (ALT 250 FOR IP): Performed by: INTERNAL MEDICINE

## 2017-09-10 PROCEDURE — 99239 HOSP IP/OBS DSCHRG MGMT >30: CPT | Performed by: NURSE PRACTITIONER

## 2017-09-10 PROCEDURE — 2580000003 HC RX 258: Performed by: INTERNAL MEDICINE

## 2017-09-10 RX ORDER — DOXYCYCLINE HYCLATE 100 MG
100 TABLET ORAL 2 TIMES DAILY
Qty: 28 TABLET | Refills: 0 | Status: SHIPPED | OUTPATIENT
Start: 2017-09-10 | End: 2017-09-24

## 2017-09-10 RX ORDER — HYDROCODONE BITARTRATE AND ACETAMINOPHEN 5; 325 MG/1; MG/1
2 TABLET ORAL EVERY 4 HOURS PRN
Qty: 40 TABLET | Refills: 0 | Status: SHIPPED | OUTPATIENT
Start: 2017-09-10 | End: 2017-09-17

## 2017-09-10 RX ADMIN — HYDROCODONE BITARTRATE AND ACETAMINOPHEN 2 TABLET: 5; 325 TABLET ORAL at 01:21

## 2017-09-10 RX ADMIN — Medication 10 ML: at 01:21

## 2017-09-10 RX ADMIN — HYDROCODONE BITARTRATE AND ACETAMINOPHEN 2 TABLET: 5; 325 TABLET ORAL at 14:07

## 2017-09-10 RX ADMIN — PANTOPRAZOLE SODIUM 40 MG: 40 TABLET, DELAYED RELEASE ORAL at 05:45

## 2017-09-10 RX ADMIN — VANCOMYCIN HYDROCHLORIDE 1250 MG: 5 INJECTION, POWDER, LYOPHILIZED, FOR SOLUTION INTRAVENOUS at 01:21

## 2017-09-10 RX ADMIN — VANCOMYCIN HYDROCHLORIDE 1250 MG: 5 INJECTION, POWDER, LYOPHILIZED, FOR SOLUTION INTRAVENOUS at 10:05

## 2017-09-10 RX ADMIN — HYDROCODONE BITARTRATE AND ACETAMINOPHEN 2 TABLET: 5; 325 TABLET ORAL at 10:11

## 2017-09-10 RX ADMIN — HYDROCODONE BITARTRATE AND ACETAMINOPHEN 2 TABLET: 5; 325 TABLET ORAL at 05:45

## 2017-09-10 ASSESSMENT — PAIN DESCRIPTION - LOCATION
LOCATION: ELBOW
LOCATION: ELBOW

## 2017-09-10 ASSESSMENT — PAIN DESCRIPTION - PROGRESSION
CLINICAL_PROGRESSION: GRADUALLY IMPROVING
CLINICAL_PROGRESSION: GRADUALLY IMPROVING

## 2017-09-10 ASSESSMENT — PAIN SCALES - GENERAL
PAINLEVEL_OUTOF10: 10
PAINLEVEL_OUTOF10: 7
PAINLEVEL_OUTOF10: 7
PAINLEVEL_OUTOF10: 0
PAINLEVEL_OUTOF10: 8

## 2017-09-10 ASSESSMENT — PAIN DESCRIPTION - ONSET
ONSET: ON-GOING
ONSET: ON-GOING

## 2017-09-10 ASSESSMENT — PAIN DESCRIPTION - FREQUENCY
FREQUENCY: CONTINUOUS
FREQUENCY: CONTINUOUS

## 2017-09-10 ASSESSMENT — PAIN DESCRIPTION - DESCRIPTORS
DESCRIPTORS: ACHING
DESCRIPTORS: ACHING

## 2017-09-10 ASSESSMENT — PAIN DESCRIPTION - PAIN TYPE
TYPE: ACUTE PAIN
TYPE: ACUTE PAIN

## 2017-09-10 ASSESSMENT — PAIN DESCRIPTION - ORIENTATION
ORIENTATION: LEFT
ORIENTATION: LEFT

## 2017-09-12 ENCOUNTER — TELEPHONE (OUTPATIENT)
Dept: ORTHOPEDIC SURGERY | Age: 42
End: 2017-09-12

## 2017-09-12 DIAGNOSIS — K21.9 GASTROESOPHAGEAL REFLUX DISEASE, ESOPHAGITIS PRESENCE NOT SPECIFIED: ICD-10-CM

## 2017-09-12 LAB
CULTURE: NORMAL
Lab: NORMAL
Lab: NORMAL
SPECIMEN DESCRIPTION: NORMAL
STATUS: NORMAL
STATUS: NORMAL

## 2017-09-13 RX ORDER — OMEPRAZOLE 20 MG/1
CAPSULE, DELAYED RELEASE ORAL
Qty: 30 CAPSULE | Refills: 0 | Status: SHIPPED | OUTPATIENT
Start: 2017-09-13 | End: 2017-10-14 | Stop reason: ALTCHOICE

## 2017-09-20 ENCOUNTER — HOSPITAL ENCOUNTER (EMERGENCY)
Age: 42
Discharge: HOME | End: 2017-09-20
Attending: EMERGENCY MEDICINE
Payer: MEDICARE

## 2017-09-20 VITALS
HEIGHT: 71 IN | OXYGEN SATURATION: 97 % | TEMPERATURE: 98.6 F | HEART RATE: 115 BPM | WEIGHT: 200 LBS | DIASTOLIC BLOOD PRESSURE: 106 MMHG | BODY MASS INDEX: 28 KG/M2 | RESPIRATION RATE: 16 BRPM | SYSTOLIC BLOOD PRESSURE: 154 MMHG

## 2017-09-20 DIAGNOSIS — F10.920 ACUTE ALCOHOLIC INTOXICATION, UNCOMPLICATED (HCC): Primary | ICD-10-CM

## 2017-09-20 DIAGNOSIS — M25.522 LEFT ELBOW PAIN: ICD-10-CM

## 2017-09-20 DIAGNOSIS — L02.419 ABSCESS OF ELBOW: ICD-10-CM

## 2017-09-20 DIAGNOSIS — T07.XXXA ABRASIONS OF MULTIPLE SITES: ICD-10-CM

## 2017-09-20 PROCEDURE — 6370000000 HC RX 637 (ALT 250 FOR IP): Performed by: EMERGENCY MEDICINE

## 2017-09-20 PROCEDURE — 99283 EMERGENCY DEPT VISIT LOW MDM: CPT

## 2017-09-20 RX ORDER — ACETAMINOPHEN 325 MG/1
650 TABLET ORAL ONCE
Status: COMPLETED | OUTPATIENT
Start: 2017-09-20 | End: 2017-09-20

## 2017-09-20 RX ADMIN — ACETAMINOPHEN 650 MG: 325 TABLET ORAL at 02:30

## 2017-09-20 ASSESSMENT — ENCOUNTER SYMPTOMS
VOMITING: 0
SORE THROAT: 0
DIARRHEA: 0
RHINORRHEA: 0
ABDOMINAL PAIN: 0
SHORTNESS OF BREATH: 0
NAUSEA: 0
COUGH: 0
WHEEZING: 0
CONSTIPATION: 0

## 2017-09-20 ASSESSMENT — PAIN SCALES - GENERAL: PAINLEVEL_OUTOF10: 8

## 2017-09-26 ENCOUNTER — OFFICE VISIT (OUTPATIENT)
Dept: ORTHOPEDIC SURGERY | Age: 42
End: 2017-09-26

## 2017-09-26 VITALS — WEIGHT: 199.96 LBS | BODY MASS INDEX: 27.99 KG/M2 | HEIGHT: 71 IN

## 2017-09-26 DIAGNOSIS — M70.22 OLECRANON BURSITIS OF LEFT ELBOW: Primary | ICD-10-CM

## 2017-09-26 PROCEDURE — 99024 POSTOP FOLLOW-UP VISIT: CPT | Performed by: ORTHOPAEDIC SURGERY

## 2017-09-26 RX ORDER — DOXYCYCLINE HYCLATE 100 MG
TABLET ORAL
COMMUNITY
Start: 2017-09-10 | End: 2017-09-26 | Stop reason: ALTCHOICE

## 2017-09-26 RX ORDER — DIVALPROEX SODIUM 500 MG/1
TABLET, EXTENDED RELEASE ORAL
COMMUNITY
Start: 2017-06-29 | End: 2017-09-26

## 2017-09-26 RX ORDER — AMITRIPTYLINE HYDROCHLORIDE 50 MG/1
TABLET, FILM COATED ORAL
COMMUNITY
Start: 2017-06-29 | End: 2017-09-26

## 2017-09-26 RX ORDER — CEPHALEXIN 250 MG/1
CAPSULE ORAL
COMMUNITY
Start: 2017-09-05 | End: 2017-09-26 | Stop reason: ALTCHOICE

## 2017-09-26 RX ORDER — MULTIVITAMIN WITH FOLIC ACID 400 MCG
TABLET ORAL
COMMUNITY
Start: 2017-06-26 | End: 2017-09-26 | Stop reason: ALTCHOICE

## 2017-09-26 RX ORDER — FOLIC ACID 1 MG/1
TABLET ORAL
COMMUNITY
Start: 2017-06-26 | End: 2017-09-26

## 2017-09-26 RX ORDER — HYDROCODONE BITARTRATE AND ACETAMINOPHEN 5; 325 MG/1; MG/1
TABLET ORAL
COMMUNITY
Start: 2017-09-10 | End: 2017-09-26 | Stop reason: ALTCHOICE

## 2017-10-14 ENCOUNTER — HOSPITAL ENCOUNTER (EMERGENCY)
Age: 42
Discharge: HOME | End: 2017-10-15
Attending: EMERGENCY MEDICINE
Payer: MEDICARE

## 2017-10-14 ENCOUNTER — APPOINTMENT (OUTPATIENT)
Dept: GENERAL RADIOLOGY | Age: 42
End: 2017-10-14
Payer: MEDICARE

## 2017-10-14 VITALS
DIASTOLIC BLOOD PRESSURE: 71 MMHG | BODY MASS INDEX: 27.29 KG/M2 | SYSTOLIC BLOOD PRESSURE: 118 MMHG | TEMPERATURE: 98.1 F | RESPIRATION RATE: 16 BRPM | WEIGHT: 194.9 LBS | HEART RATE: 101 BPM | HEIGHT: 71 IN | OXYGEN SATURATION: 94 %

## 2017-10-14 DIAGNOSIS — S83.92XA SPRAIN OF LEFT KNEE, UNSPECIFIED LIGAMENT, INITIAL ENCOUNTER: Primary | ICD-10-CM

## 2017-10-14 PROCEDURE — 73562 X-RAY EXAM OF KNEE 3: CPT

## 2017-10-14 PROCEDURE — 99284 EMERGENCY DEPT VISIT MOD MDM: CPT

## 2017-10-14 RX ORDER — KETOROLAC TROMETHAMINE 30 MG/ML
30 INJECTION, SOLUTION INTRAMUSCULAR; INTRAVENOUS ONCE
Status: COMPLETED | OUTPATIENT
Start: 2017-10-14 | End: 2017-10-15

## 2017-10-14 ASSESSMENT — PAIN DESCRIPTION - ORIENTATION: ORIENTATION: LEFT

## 2017-10-14 ASSESSMENT — PAIN SCALES - GENERAL: PAINLEVEL_OUTOF10: 6

## 2017-10-14 ASSESSMENT — PAIN DESCRIPTION - LOCATION: LOCATION: KNEE

## 2017-10-15 PROCEDURE — 6360000002 HC RX W HCPCS: Performed by: NURSE PRACTITIONER

## 2017-10-15 PROCEDURE — 96372 THER/PROPH/DIAG INJ SC/IM: CPT

## 2017-10-15 RX ORDER — ACETAMINOPHEN AND CODEINE PHOSPHATE 300; 30 MG/1; MG/1
1 TABLET ORAL 3 TIMES DAILY PRN
Qty: 15 TABLET | Refills: 0 | Status: SHIPPED | OUTPATIENT
Start: 2017-10-15 | End: 2018-03-03

## 2017-10-15 RX ADMIN — KETOROLAC TROMETHAMINE 30 MG: 30 INJECTION, SOLUTION INTRAMUSCULAR at 00:01

## 2017-10-15 ASSESSMENT — PAIN SCALES - GENERAL: PAINLEVEL_OUTOF10: 6

## 2017-10-15 NOTE — ED PROVIDER NOTES
The patient was seen and examined by me in conjunction with the mid-level provider. I agree with his/her assessment and treatment plan. My clinical impression is that the patient has a knee sprain. X-ray per radiologist shows no acute findings.   Knee joint is stable on physical exam.     Kasia Quintanilla MD  10/15/17 [id number]

## 2017-10-23 DIAGNOSIS — K21.9 GASTROESOPHAGEAL REFLUX DISEASE, ESOPHAGITIS PRESENCE NOT SPECIFIED: ICD-10-CM

## 2017-10-26 RX ORDER — OMEPRAZOLE 20 MG/1
CAPSULE, DELAYED RELEASE ORAL
Qty: 30 CAPSULE | Refills: 0 | Status: SHIPPED | OUTPATIENT
Start: 2017-10-26 | End: 2018-01-04 | Stop reason: SDUPTHER

## 2017-11-21 ENCOUNTER — OFFICE VISIT (OUTPATIENT)
Dept: PODIATRY | Age: 42
End: 2017-11-21
Payer: MEDICARE

## 2017-11-21 VITALS — HEIGHT: 71 IN | WEIGHT: 200 LBS | BODY MASS INDEX: 28 KG/M2

## 2017-11-21 DIAGNOSIS — M15.0 PRIMARY GENERALIZED HYPERTROPHIC OSTEOARTHROSIS: ICD-10-CM

## 2017-11-21 DIAGNOSIS — M72.2 PLANTAR FASCIAL FIBROMATOSIS: Primary | ICD-10-CM

## 2017-11-21 PROCEDURE — G8417 CALC BMI ABV UP PARAM F/U: HCPCS | Performed by: PODIATRIST

## 2017-11-21 PROCEDURE — 99213 OFFICE O/P EST LOW 20 MIN: CPT | Performed by: PODIATRIST

## 2017-11-21 PROCEDURE — L3020 FOOT LONGITUD/METATARSAL SUP: HCPCS | Performed by: PODIATRIST

## 2017-11-21 PROCEDURE — G8427 DOCREV CUR MEDS BY ELIG CLIN: HCPCS | Performed by: PODIATRIST

## 2017-11-21 PROCEDURE — G8484 FLU IMMUNIZE NO ADMIN: HCPCS | Performed by: PODIATRIST

## 2017-11-21 PROCEDURE — 73630 X-RAY EXAM OF FOOT: CPT | Performed by: PODIATRIST

## 2017-11-21 PROCEDURE — 4004F PT TOBACCO SCREEN RCVD TLK: CPT | Performed by: PODIATRIST

## 2017-11-21 PROCEDURE — 73600 X-RAY EXAM OF ANKLE: CPT | Performed by: PODIATRIST

## 2017-11-21 RX ORDER — PREDNISONE 10 MG/1
TABLET ORAL
Qty: 20 TABLET | Refills: 0 | Status: SHIPPED | OUTPATIENT
Start: 2017-11-21 | End: 2018-03-03
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
## 2017-12-05 ENCOUNTER — OFFICE VISIT (OUTPATIENT)
Dept: PODIATRY | Age: 42
End: 2017-12-05
Payer: MEDICARE

## 2017-12-05 VITALS
HEIGHT: 70 IN | SYSTOLIC BLOOD PRESSURE: 128 MMHG | DIASTOLIC BLOOD PRESSURE: 84 MMHG | BODY MASS INDEX: 28.63 KG/M2 | WEIGHT: 200 LBS | HEART RATE: 68 BPM

## 2017-12-05 DIAGNOSIS — M72.2 PLANTAR FASCIAL FIBROMATOSIS: ICD-10-CM

## 2017-12-05 DIAGNOSIS — M15.0 PRIMARY GENERALIZED HYPERTROPHIC OSTEOARTHROSIS: Primary | ICD-10-CM

## 2017-12-05 PROCEDURE — G8427 DOCREV CUR MEDS BY ELIG CLIN: HCPCS | Performed by: PODIATRIST

## 2017-12-05 PROCEDURE — G8417 CALC BMI ABV UP PARAM F/U: HCPCS | Performed by: PODIATRIST

## 2017-12-05 PROCEDURE — G8484 FLU IMMUNIZE NO ADMIN: HCPCS | Performed by: PODIATRIST

## 2017-12-05 PROCEDURE — 4004F PT TOBACCO SCREEN RCVD TLK: CPT | Performed by: PODIATRIST

## 2017-12-05 PROCEDURE — 99213 OFFICE O/P EST LOW 20 MIN: CPT | Performed by: PODIATRIST

## 2017-12-05 RX ORDER — INFLUENZA VIRUS VACCINE 15; 15; 15; 15 UG/.5ML; UG/.5ML; UG/.5ML; UG/.5ML
SUSPENSION INTRAMUSCULAR
Status: ON HOLD | COMMUNITY
Start: 2017-10-23 | End: 2019-03-26
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
## 2017-12-16 DIAGNOSIS — G25.81 RESTLESS LEG SYNDROME: ICD-10-CM

## 2017-12-18 RX ORDER — ROPINIROLE 1 MG/1
TABLET, FILM COATED ORAL
Qty: 30 TABLET | Refills: 2 | Status: SHIPPED | OUTPATIENT
Start: 2017-12-18 | End: 2018-03-20 | Stop reason: SDUPTHER

## 2018-01-04 DIAGNOSIS — K21.9 GASTROESOPHAGEAL REFLUX DISEASE, ESOPHAGITIS PRESENCE NOT SPECIFIED: ICD-10-CM

## 2018-01-04 RX ORDER — OMEPRAZOLE 20 MG/1
CAPSULE, DELAYED RELEASE ORAL
Qty: 30 CAPSULE | Refills: 0 | Status: SHIPPED | OUTPATIENT
Start: 2018-01-04 | End: 2018-03-20 | Stop reason: SDUPTHER

## 2018-03-03 ENCOUNTER — APPOINTMENT (OUTPATIENT)
Dept: GENERAL RADIOLOGY | Age: 43
End: 2018-03-03
Payer: MEDICARE

## 2018-03-03 ENCOUNTER — HOSPITAL ENCOUNTER (EMERGENCY)
Age: 43
Discharge: HOME | End: 2018-03-03
Attending: EMERGENCY MEDICINE
Payer: MEDICARE

## 2018-03-03 VITALS
DIASTOLIC BLOOD PRESSURE: 92 MMHG | BODY MASS INDEX: 28.7 KG/M2 | OXYGEN SATURATION: 96 % | SYSTOLIC BLOOD PRESSURE: 155 MMHG | WEIGHT: 205 LBS | TEMPERATURE: 98.7 F | HEIGHT: 71 IN | HEART RATE: 106 BPM

## 2018-03-03 DIAGNOSIS — S62.356A CLOSED NONDISPLACED FRACTURE OF SHAFT OF FIFTH METACARPAL BONE OF RIGHT HAND, INITIAL ENCOUNTER: Primary | ICD-10-CM

## 2018-03-03 PROCEDURE — 99283 EMERGENCY DEPT VISIT LOW MDM: CPT

## 2018-03-03 PROCEDURE — 73130 X-RAY EXAM OF HAND: CPT

## 2018-03-03 PROCEDURE — 73120 X-RAY EXAM OF HAND: CPT

## 2018-03-03 RX ORDER — ACETAMINOPHEN AND CODEINE PHOSPHATE 300; 30 MG/1; MG/1
1 TABLET ORAL EVERY 6 HOURS PRN
Qty: 20 TABLET | Refills: 0 | Status: SHIPPED | OUTPATIENT
Start: 2018-03-03 | End: 2018-03-08

## 2018-03-03 ASSESSMENT — ENCOUNTER SYMPTOMS
CONSTIPATION: 0
COUGH: 0
FACIAL SWELLING: 0
EYE DISCHARGE: 0
EYE REDNESS: 0
SHORTNESS OF BREATH: 0
DIARRHEA: 0
VOMITING: 0
COLOR CHANGE: 0
ABDOMINAL PAIN: 0

## 2018-03-03 ASSESSMENT — PAIN DESCRIPTION - LOCATION: LOCATION: HAND

## 2018-03-03 ASSESSMENT — PAIN DESCRIPTION - PAIN TYPE: TYPE: ACUTE PAIN

## 2018-03-03 ASSESSMENT — PAIN DESCRIPTION - ORIENTATION: ORIENTATION: RIGHT

## 2018-03-03 ASSESSMENT — PAIN SCALES - GENERAL: PAINLEVEL_OUTOF10: 7

## 2018-03-03 NOTE — ED PROVIDER NOTES
Other      G.Parents    Alcohol Abuse Father      Family Status   Relation Status    Mother     Other     Father         SOCIAL HISTORY      reports that he has never smoked. His smokeless tobacco use includes Chew. He reports that he drinks about 53.4 - 54.0 oz of alcohol per week . He reports that he uses drugs, including Cocaine. REVIEW OF SYSTEMS    (2-9 systems for level 4, 10 or more for level 5)     Review of Systems   Constitutional: Negative for chills, fatigue and fever. HENT: Negative for congestion, ear discharge and facial swelling. Eyes: Negative for discharge and redness. Respiratory: Negative for cough and shortness of breath. Cardiovascular: Negative for chest pain. Gastrointestinal: Negative for abdominal pain, constipation, diarrhea and vomiting. Genitourinary: Negative for dysuria and hematuria. Musculoskeletal: Negative for arthralgias. Skin: Negative for color change and rash. Neurological: Negative for syncope, numbness and headaches. Hematological: Negative for adenopathy. Psychiatric/Behavioral: Negative for confusion. The patient is not nervous/anxious. Except as noted above the remainder of the review of systems was reviewed and negative. PHYSICAL EXAM    (up to 7 for level 4, 8 or more for level 5)     Vitals:    03/03/18 0450   BP: (!) 155/92   Pulse: 106   Temp: 98.7 °F (37.1 °C)   SpO2: 96%   Weight: 205 lb (93 kg)   Height: 5' 11\" (1.803 m)       Physical Exam   Constitutional: He is oriented to person, place, and time. He appears well-developed and well-nourished. No distress. HENT:   Head: Normocephalic and atraumatic. Eyes: Right eye exhibits no discharge. Left eye exhibits no discharge. No scleral icterus. Neck: Neck supple. Cardiovascular: Normal rate and regular rhythm. Pulmonary/Chest: Effort normal and breath sounds normal. No stridor. No respiratory distress. He has no wheezes. He has no rales. Abdominal: Soft.  He exhibits no distension. There is no tenderness. Genitourinary:   Genitourinary Comments: He has tenderness on the ulnar side of his right hand. There is no bruising or break in the skin. Musculoskeletal: Normal range of motion. Lymphadenopathy:     He has no cervical adenopathy. Neurological: He is alert and oriented to person, place, and time. Skin: Skin is warm and dry. No rash noted. He is not diaphoretic. No erythema. Psychiatric: He has a normal mood and affect. His behavior is normal.   Vitals reviewed. DIAGNOSTIC RESULTS     EKG: All EKG's are interpreted by the Emergency Department Physician who either signs or Co-signs this chart in the absence of a cardiologist.    Not indicated    RADIOLOGY:   Non-plain film images such as CT, Ultrasound and MRI are read by the radiologist. Plain radiographic images are visualized and preliminarily interpreted by the emergency physician with the below findings:    X-ray of the hand on my interpretation shows a fifth metacarpal fracture, nondisplaced and non-angulated    Interpretation per the Radiologist below, if available at the time of this note:        LABS:  Labs Reviewed - No data to display    All other labs were within normal range or not returned as of this dictation. EMERGENCY DEPARTMENT COURSE and DIFFERENTIAL DIAGNOSIS/MDM:   Vitals:    Vitals:    03/03/18 0450   BP: (!) 155/92   Pulse: 106   Temp: 98.7 °F (37.1 °C)   SpO2: 96%   Weight: 205 lb (93 kg)   Height: 5' 11\" (1.803 m)       Orders Placed This Encounter   Medications    acetaminophen-codeine (TYLENOL/CODEINE #3) 300-30 MG per tablet     Sig: Take 1 tablet by mouth every 6 hours as needed for Pain for up to 5 days. Dispense:  20 tablet     Refill:  0       Medical Decision Making: Splint and sling were applied and application of both of these were checked by me and found to be appropriate. He will follow-up with orthopedics and was instructed not to remove the splint.

## 2018-03-05 ENCOUNTER — OFFICE VISIT (OUTPATIENT)
Dept: ORTHOPEDIC SURGERY | Age: 43
End: 2018-03-05
Payer: MEDICARE

## 2018-03-05 VITALS — HEIGHT: 71 IN | BODY MASS INDEX: 28 KG/M2 | WEIGHT: 200 LBS

## 2018-03-05 DIAGNOSIS — S62.306A CLOSED FRACTURE OF FIFTH METACARPAL BONE OF RIGHT HAND, UNSPECIFIED FRACTURE MORPHOLOGY, INITIAL ENCOUNTER: Primary | ICD-10-CM

## 2018-03-05 PROCEDURE — G8427 DOCREV CUR MEDS BY ELIG CLIN: HCPCS | Performed by: ORTHOPAEDIC SURGERY

## 2018-03-05 PROCEDURE — 99214 OFFICE O/P EST MOD 30 MIN: CPT | Performed by: ORTHOPAEDIC SURGERY

## 2018-03-05 PROCEDURE — G8417 CALC BMI ABV UP PARAM F/U: HCPCS | Performed by: ORTHOPAEDIC SURGERY

## 2018-03-05 PROCEDURE — 4004F PT TOBACCO SCREEN RCVD TLK: CPT | Performed by: ORTHOPAEDIC SURGERY

## 2018-03-05 PROCEDURE — G8484 FLU IMMUNIZE NO ADMIN: HCPCS | Performed by: ORTHOPAEDIC SURGERY

## 2018-03-05 RX ORDER — TRAMADOL HYDROCHLORIDE 50 MG/1
50 TABLET ORAL EVERY 6 HOURS PRN
Qty: 28 TABLET | Refills: 0 | Status: SHIPPED | OUTPATIENT
Start: 2018-03-05 | End: 2018-03-12

## 2018-03-05 NOTE — PROGRESS NOTES
I performed a history and physical examination of the patient and discussed management with the resident. I reviewed the residents note and agree with the documented findings and plan of care. Any areas of disagreement are noted on the chart. I have personally evaluated this patient and have completed at least one if not all key elements of the E/M (history, physical exam, and MDM). Additional findings are as noted. I agree with the chief complaint, past medical history, past surgical history, allergies, medications, social and family history as documented unless otherwise noted below.      Electronically signed by Ekta Gomez DO on 3/5/2018 at 3:32 PM
input(s): PT     Radiology:    3 views of the right hand were reviewed in the office which demonstrate a minimally displaced fifth metacarpal shaft fracture with apex dorsal angulation of about 30-40°. Assessment:   Patient Active Problem List   Diagnosis    Bipolar 1 disorder, depressed, severe ([address]    Alcohol abuse    Major depressive disorder, recurrent episode, severe (HCC)    Olecranon bursitis of left elbow    Left arm cellulitis    Homicidal ideation    Severe manic bipolar I disorder without psychotic features ([address]       44 y.o. male with right hand 5th Metacarpal shaft fx     Plan:  Discussion with Dr. Gomez Glabarbara the patient was in the office today. At this point we recommend nonoperative management. Patient has overall good rotational alignment of his fifth digit but he does have previous injuries to his right hand which has resulted in some deformity of his third and fourth digits. Patient is able to fully extend his 5th digit but he does have limited flexion due to pain. A ulnar gutter type cast was replaced to his right upper extremity. Plan to have the patient follow-up and repeat an x-ray of his right hand to evaluate his healing potential.  Elected to discontinue the immobilization around 3-4 weeks to begin range of motion of his hand and likely transition to a brace. Work note was provided to the patient. A prescription for tramadol was also provided.   The patient will follow-up in 4 weeks with 3 views of the right hand out of the cast.        ARISTIDES Fernandez 106, DO

## 2018-03-19 ENCOUNTER — OFFICE VISIT (OUTPATIENT)
Dept: ORTHOPEDIC SURGERY | Age: 43
End: 2018-03-19
Payer: MEDICARE

## 2018-03-19 VITALS — HEIGHT: 71 IN | BODY MASS INDEX: 28 KG/M2 | WEIGHT: 200 LBS

## 2018-03-19 DIAGNOSIS — S62.306S: Primary | ICD-10-CM

## 2018-03-19 DIAGNOSIS — S62.306A CLOSED FRACTURE OF FIFTH METACARPAL BONE OF RIGHT HAND, UNSPECIFIED FRACTURE MORPHOLOGY, INITIAL ENCOUNTER: ICD-10-CM

## 2018-03-19 PROCEDURE — G8484 FLU IMMUNIZE NO ADMIN: HCPCS | Performed by: STUDENT IN AN ORGANIZED HEALTH CARE EDUCATION/TRAINING PROGRAM

## 2018-03-19 PROCEDURE — G8417 CALC BMI ABV UP PARAM F/U: HCPCS | Performed by: STUDENT IN AN ORGANIZED HEALTH CARE EDUCATION/TRAINING PROGRAM

## 2018-03-19 PROCEDURE — 4004F PT TOBACCO SCREEN RCVD TLK: CPT | Performed by: STUDENT IN AN ORGANIZED HEALTH CARE EDUCATION/TRAINING PROGRAM

## 2018-03-19 PROCEDURE — G8427 DOCREV CUR MEDS BY ELIG CLIN: HCPCS | Performed by: STUDENT IN AN ORGANIZED HEALTH CARE EDUCATION/TRAINING PROGRAM

## 2018-03-19 PROCEDURE — 99213 OFFICE O/P EST LOW 20 MIN: CPT | Performed by: STUDENT IN AN ORGANIZED HEALTH CARE EDUCATION/TRAINING PROGRAM

## 2018-03-19 ASSESSMENT — ENCOUNTER SYMPTOMS
SHORTNESS OF BREATH: 0
BACK PAIN: 0
NAUSEA: 0
VOMITING: 0

## 2018-03-20 ENCOUNTER — OFFICE VISIT (OUTPATIENT)
Dept: FAMILY MEDICINE CLINIC | Age: 43
End: 2018-03-20
Payer: MEDICARE

## 2018-03-20 VITALS
HEART RATE: 96 BPM | TEMPERATURE: 98.1 F | BODY MASS INDEX: 27.94 KG/M2 | DIASTOLIC BLOOD PRESSURE: 97 MMHG | SYSTOLIC BLOOD PRESSURE: 142 MMHG | HEIGHT: 71 IN | WEIGHT: 199.6 LBS

## 2018-03-20 DIAGNOSIS — A60.02 HERPES SIMPLEX INFECTION OF OTHER SITE OF MALE GENITAL ORGAN: ICD-10-CM

## 2018-03-20 DIAGNOSIS — S62.306D CLOSED FRACTURE OF FIFTH METACARPAL BONE OF RIGHT HAND WITH ROUTINE HEALING, UNSPECIFIED FRACTURE MORPHOLOGY, SUBSEQUENT ENCOUNTER: Primary | ICD-10-CM

## 2018-03-20 DIAGNOSIS — K21.9 GASTROESOPHAGEAL REFLUX DISEASE, ESOPHAGITIS PRESENCE NOT SPECIFIED: ICD-10-CM

## 2018-03-20 DIAGNOSIS — G25.81 RESTLESS LEG SYNDROME: ICD-10-CM

## 2018-03-20 PROCEDURE — G8427 DOCREV CUR MEDS BY ELIG CLIN: HCPCS | Performed by: STUDENT IN AN ORGANIZED HEALTH CARE EDUCATION/TRAINING PROGRAM

## 2018-03-20 PROCEDURE — 99213 OFFICE O/P EST LOW 20 MIN: CPT

## 2018-03-20 PROCEDURE — G8484 FLU IMMUNIZE NO ADMIN: HCPCS | Performed by: STUDENT IN AN ORGANIZED HEALTH CARE EDUCATION/TRAINING PROGRAM

## 2018-03-20 PROCEDURE — G8417 CALC BMI ABV UP PARAM F/U: HCPCS | Performed by: STUDENT IN AN ORGANIZED HEALTH CARE EDUCATION/TRAINING PROGRAM

## 2018-03-20 PROCEDURE — 99213 OFFICE O/P EST LOW 20 MIN: CPT | Performed by: STUDENT IN AN ORGANIZED HEALTH CARE EDUCATION/TRAINING PROGRAM

## 2018-03-20 PROCEDURE — 4004F PT TOBACCO SCREEN RCVD TLK: CPT | Performed by: STUDENT IN AN ORGANIZED HEALTH CARE EDUCATION/TRAINING PROGRAM

## 2018-03-20 RX ORDER — VALACYCLOVIR HYDROCHLORIDE 1 G/1
1000 TABLET, FILM COATED ORAL DAILY
Qty: 30 TABLET | Refills: 5 | Status: ON HOLD | OUTPATIENT
Start: 2018-03-20 | End: 2019-03-26

## 2018-03-20 RX ORDER — IBUPROFEN 800 MG/1
800 TABLET ORAL EVERY 6 HOURS PRN
Qty: 120 TABLET | Refills: 3 | Status: SHIPPED | OUTPATIENT
Start: 2018-03-20 | End: 2018-07-13 | Stop reason: SDUPTHER

## 2018-03-20 RX ORDER — ROPINIROLE 1 MG/1
TABLET, FILM COATED ORAL
Qty: 30 TABLET | Refills: 3 | Status: SHIPPED | OUTPATIENT
Start: 2018-03-20 | End: 2018-04-06 | Stop reason: SDUPTHER

## 2018-03-20 RX ORDER — OMEPRAZOLE 20 MG/1
CAPSULE, DELAYED RELEASE ORAL
Qty: 30 CAPSULE | Refills: 3 | Status: SHIPPED | OUTPATIENT
Start: 2018-03-20 | End: 2018-07-13 | Stop reason: SDUPTHER

## 2018-03-20 ASSESSMENT — ENCOUNTER SYMPTOMS
COUGH: 0
CONSTIPATION: 0
NAUSEA: 0
VOMITING: 0
SORE THROAT: 0
EYE PAIN: 0
SHORTNESS OF BREATH: 0
APNEA: 0
WHEEZING: 0
DIARRHEA: 0
BLOOD IN STOOL: 0
RHINORRHEA: 0
TROUBLE SWALLOWING: 0
ABDOMINAL PAIN: 0
CHEST TIGHTNESS: 0
ABDOMINAL DISTENTION: 0

## 2018-03-20 NOTE — PROGRESS NOTES
Attending Physician Statement  I  have discussed the care of [facility] including pertinent history and exam findings with the resident. I agree with the assessment, plan and orders as documented by the resident. BP (!) 142/97 (Site: Left Arm, Position: Sitting, Cuff Size: Large Adult)   Pulse 96   Temp 98.1 °F (36.7 °C) (Temporal)   Ht 5' 10.98\" (1.803 m)   Wt 199 lb 9.6 oz (90.5 kg)   BMI 27.85 kg/m²    BP Readings from Last 3 Encounters:   03/20/18 (!) 142/97   03/03/18 (!) 155/92   12/05/17 128/84     Wt Readings from Last 3 Encounters:   03/20/18 199 lb 9.6 oz (90.5 kg)   03/19/18 200 lb (90.7 kg)   03/05/18 200 lb (90.7 kg)         1. Closed fracture of fifth metacarpal bone of right hand with routine healing, unspecified fracture morphology, subsequent encounter     2. Gastroesophageal reflux disease, esophagitis presence not specified  omeprazole (PRILOSEC) 20 MG delayed release capsule   3. Restless leg syndrome  rOPINIRole (REQUIP) 1 MG tablet   4. Herpes simplex infection of other site of male genital organ  valACYclovir (VALTREX) 1 g tablet     Ortho is overseeing the fracture management - patient will follow up for all aspects of care speciific    Medical conditions reviewed: orders noted.     TYSON - 3-6 mo    Romario Faye DO 3/20/2018 4:47 PM
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
## 2018-03-28 ENCOUNTER — HOSPITAL ENCOUNTER (EMERGENCY)
Age: 43
Discharge: HOME | End: 2018-03-28
Attending: EMERGENCY MEDICINE
Payer: MEDICARE

## 2018-03-28 VITALS
SYSTOLIC BLOOD PRESSURE: 176 MMHG | DIASTOLIC BLOOD PRESSURE: 116 MMHG | BODY MASS INDEX: 28.6 KG/M2 | HEART RATE: 104 BPM | WEIGHT: 205 LBS | OXYGEN SATURATION: 97 % | TEMPERATURE: 98.4 F | RESPIRATION RATE: 20 BRPM

## 2018-03-28 DIAGNOSIS — S60.511A ABRASION OF RIGHT HAND, INITIAL ENCOUNTER: Primary | ICD-10-CM

## 2018-03-28 PROCEDURE — 99283 EMERGENCY DEPT VISIT LOW MDM: CPT

## 2018-03-28 PROCEDURE — 6360000002 HC RX W HCPCS: Performed by: PHYSICIAN ASSISTANT

## 2018-03-28 PROCEDURE — 96372 THER/PROPH/DIAG INJ SC/IM: CPT

## 2018-03-28 RX ORDER — KETOROLAC TROMETHAMINE 30 MG/ML
30 INJECTION, SOLUTION INTRAMUSCULAR; INTRAVENOUS ONCE
Status: COMPLETED | OUTPATIENT
Start: 2018-03-28 | End: 2018-03-28

## 2018-03-28 RX ORDER — MUPIROCIN CALCIUM 20 MG/G
CREAM TOPICAL
Qty: 30 G | Refills: 2 | Status: SHIPPED | OUTPATIENT
Start: 2018-03-28 | End: 2018-04-27

## 2018-03-28 RX ADMIN — KETOROLAC TROMETHAMINE 30 MG: 30 INJECTION, SOLUTION INTRAMUSCULAR; INTRAVENOUS at 20:57

## 2018-03-28 ASSESSMENT — ENCOUNTER SYMPTOMS
RESPIRATORY NEGATIVE: 1
ALLERGIC/IMMUNOLOGIC NEGATIVE: 1

## 2018-03-28 ASSESSMENT — PAIN SCALES - GENERAL: PAINLEVEL_OUTOF10: 7

## 2018-04-02 ENCOUNTER — OFFICE VISIT (OUTPATIENT)
Dept: ORTHOPEDIC SURGERY | Age: 43
End: 2018-04-02
Payer: MEDICARE

## 2018-04-02 VITALS
DIASTOLIC BLOOD PRESSURE: 78 MMHG | HEIGHT: 71 IN | SYSTOLIC BLOOD PRESSURE: 112 MMHG | WEIGHT: 205.03 LBS | BODY MASS INDEX: 28.7 KG/M2 | HEART RATE: 81 BPM

## 2018-04-02 DIAGNOSIS — S62.306S: Primary | ICD-10-CM

## 2018-04-02 PROCEDURE — G8417 CALC BMI ABV UP PARAM F/U: HCPCS | Performed by: ORTHOPAEDIC SURGERY

## 2018-04-02 PROCEDURE — 99213 OFFICE O/P EST LOW 20 MIN: CPT | Performed by: ORTHOPAEDIC SURGERY

## 2018-04-02 PROCEDURE — G8427 DOCREV CUR MEDS BY ELIG CLIN: HCPCS | Performed by: ORTHOPAEDIC SURGERY

## 2018-04-02 PROCEDURE — 4004F PT TOBACCO SCREEN RCVD TLK: CPT | Performed by: ORTHOPAEDIC SURGERY

## 2018-04-02 ASSESSMENT — ENCOUNTER SYMPTOMS
VOMITING: 0
NAUSEA: 0
BACK PAIN: 0
SHORTNESS OF BREATH: 0

## 2018-04-06 ENCOUNTER — TELEPHONE (OUTPATIENT)
Dept: ADMINISTRATIVE | Age: 43
End: 2018-04-06

## 2018-04-06 DIAGNOSIS — G25.81 RESTLESS LEG SYNDROME: ICD-10-CM

## 2018-04-06 RX ORDER — ROPINIROLE 1 MG/1
TABLET, FILM COATED ORAL
Qty: 30 TABLET | Refills: 3 | Status: SHIPPED | OUTPATIENT
Start: 2018-04-06 | End: 2018-11-08 | Stop reason: SDUPTHER

## 2018-07-13 DIAGNOSIS — K21.9 GASTROESOPHAGEAL REFLUX DISEASE, ESOPHAGITIS PRESENCE NOT SPECIFIED: ICD-10-CM

## 2018-07-13 RX ORDER — IBUPROFEN 800 MG/1
TABLET ORAL
Qty: 120 TABLET | Refills: 3 | Status: SHIPPED | OUTPATIENT
Start: 2018-07-13 | End: 2019-01-02 | Stop reason: SDUPTHER

## 2018-07-13 RX ORDER — OMEPRAZOLE 20 MG/1
CAPSULE, DELAYED RELEASE ORAL
Qty: 30 CAPSULE | Refills: 3 | Status: SHIPPED | OUTPATIENT
Start: 2018-07-13 | End: 2018-10-27 | Stop reason: SDUPTHER

## 2018-07-17 ENCOUNTER — OFFICE VISIT (OUTPATIENT)
Dept: FAMILY MEDICINE CLINIC | Age: 43
End: 2018-07-17
Payer: MEDICARE

## 2018-07-17 ENCOUNTER — HOSPITAL ENCOUNTER (OUTPATIENT)
Age: 43
Setting detail: SPECIMEN
Discharge: HOME | End: 2018-07-17
Payer: MEDICARE

## 2018-07-17 VITALS
DIASTOLIC BLOOD PRESSURE: 70 MMHG | HEART RATE: 107 BPM | TEMPERATURE: 98.4 F | WEIGHT: 186.4 LBS | HEIGHT: 71 IN | BODY MASS INDEX: 26.1 KG/M2 | SYSTOLIC BLOOD PRESSURE: 126 MMHG

## 2018-07-17 DIAGNOSIS — F31.13 SEVERE MANIC BIPOLAR I DISORDER WITHOUT PSYCHOTIC FEATURES (HCC): ICD-10-CM

## 2018-07-17 DIAGNOSIS — Z00.00 HEALTH CARE MAINTENANCE: ICD-10-CM

## 2018-07-17 DIAGNOSIS — F10.10 ALCOHOL ABUSE: ICD-10-CM

## 2018-07-17 DIAGNOSIS — F33.2 SEVERE EPISODE OF RECURRENT MAJOR DEPRESSIVE DISORDER, WITHOUT PSYCHOTIC FEATURES (HCC): ICD-10-CM

## 2018-07-17 DIAGNOSIS — K21.9 GASTROESOPHAGEAL REFLUX DISEASE, ESOPHAGITIS PRESENCE NOT SPECIFIED: ICD-10-CM

## 2018-07-17 DIAGNOSIS — F10.10 ALCOHOL ABUSE: Primary | ICD-10-CM

## 2018-07-17 DIAGNOSIS — M25.50 POLYARTHRALGIA: ICD-10-CM

## 2018-07-17 DIAGNOSIS — F31.4 BIPOLAR 1 DISORDER, DEPRESSED, SEVERE (HCC): ICD-10-CM

## 2018-07-17 LAB
ALBUMIN SERPL-MCNC: 4.4 G/DL (ref 3.5–5.2)
ALBUMIN/GLOBULIN RATIO: 1.1 (ref 1–2.5)
ALP BLD-CCNC: 82 U/L (ref 40–129)
ALT SERPL-CCNC: 46 U/L (ref 5–41)
AST SERPL-CCNC: 45 U/L
BILIRUB SERPL-MCNC: 0.29 MG/DL (ref 0.3–1.2)
BILIRUBIN DIRECT: 0.1 MG/DL
BILIRUBIN, INDIRECT: 0.19 MG/DL (ref 0–1)
GLOBULIN: ABNORMAL G/DL (ref 1.5–3.8)
HAV IGM SER IA-ACNC: NONREACTIVE
HEPATITIS B CORE IGM ANTIBODY: NONREACTIVE
HEPATITIS B SURFACE ANTIGEN: NONREACTIVE
HEPATITIS C ANTIBODY: NONREACTIVE
HIV AG/AB: NONREACTIVE
TOTAL PROTEIN: 8.3 G/DL (ref 6.4–8.3)

## 2018-07-17 PROCEDURE — G8417 CALC BMI ABV UP PARAM F/U: HCPCS | Performed by: STUDENT IN AN ORGANIZED HEALTH CARE EDUCATION/TRAINING PROGRAM

## 2018-07-17 PROCEDURE — 99213 OFFICE O/P EST LOW 20 MIN: CPT | Performed by: STUDENT IN AN ORGANIZED HEALTH CARE EDUCATION/TRAINING PROGRAM

## 2018-07-17 PROCEDURE — 4004F PT TOBACCO SCREEN RCVD TLK: CPT | Performed by: STUDENT IN AN ORGANIZED HEALTH CARE EDUCATION/TRAINING PROGRAM

## 2018-07-17 PROCEDURE — G8427 DOCREV CUR MEDS BY ELIG CLIN: HCPCS | Performed by: STUDENT IN AN ORGANIZED HEALTH CARE EDUCATION/TRAINING PROGRAM

## 2018-07-17 ASSESSMENT — ENCOUNTER SYMPTOMS
VOMITING: 1
ANAL BLEEDING: 1
ABDOMINAL PAIN: 1
EYES NEGATIVE: 1
SHORTNESS OF BREATH: 1
COUGH: 0
BLOOD IN STOOL: 1

## 2018-07-17 ASSESSMENT — PATIENT HEALTH QUESTIONNAIRE - PHQ9
SUM OF ALL RESPONSES TO PHQ9 QUESTIONS 1 & 2: 0
2. FEELING DOWN, DEPRESSED OR HOPELESS: 0
1. LITTLE INTEREST OR PLEASURE IN DOING THINGS: 0
SUM OF ALL RESPONSES TO PHQ QUESTIONS 1-9: 0
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
## 2018-07-17 NOTE — PATIENT INSTRUCTIONS
=Thank you for letting us take care of you today. We hope all your questions were addressed. If a question was overlooked or something else comes to mind after you return home, please contact a member of your Care Team listed below. Please make sure you have a routine office visit set up to follow-up on [address]. Your Care Team at [place] [address] is Team #1  Faviola Diaz MD (Faculty)  Harjit Stanley MD (Faculty)  Daniel Kulkarni MD (Resident)  Nuris Coe MD (Resident)  Nando Oden MD (Resident)  Henri Newberry MD (Resident)  Sona Houser., [facility] [address] [facility], ([address])  RICK Jansen (Clinical Practice Manager)  Jerson Naidu, Ph.D., (Behavioral Services)  Dallas Montes De Oca, [address] [place] [address] (Clinical Pharmacist)     Office phone number: [phone]    If you need to get in right away due to illness, please be advised we have \"Same Day\" appointments available Monday-Friday. Please call us at [phone] option #3 to schedule your \"Same Day\" appointment.

## 2018-08-15 ENCOUNTER — TELEPHONE (OUTPATIENT)
Dept: ADMINISTRATIVE | Age: 43
End: 2018-08-15

## 2018-08-16 PROBLEM — Z00.00 HEALTH CARE MAINTENANCE: Status: RESOLVED | Noted: 2018-07-17 | Resolved: 2018-08-16

## 2018-08-17 ENCOUNTER — APPOINTMENT (OUTPATIENT)
Dept: GENERAL RADIOLOGY | Age: 43
End: 2018-08-17
Payer: MEDICARE

## 2018-08-17 ENCOUNTER — HOSPITAL ENCOUNTER (EMERGENCY)
Age: 43
Discharge: HOME | End: 2018-08-17
Attending: EMERGENCY MEDICINE
Payer: MEDICARE

## 2018-08-17 VITALS
RESPIRATION RATE: 13 BRPM | HEART RATE: 81 BPM | SYSTOLIC BLOOD PRESSURE: 135 MMHG | WEIGHT: 190 LBS | TEMPERATURE: 98 F | OXYGEN SATURATION: 98 % | DIASTOLIC BLOOD PRESSURE: 82 MMHG | HEIGHT: 71 IN | BODY MASS INDEX: 26.6 KG/M2

## 2018-08-17 DIAGNOSIS — F10.929 ACUTE ALCOHOLIC INTOXICATION WITH COMPLICATION (HCC): ICD-10-CM

## 2018-08-17 DIAGNOSIS — R45.851 SUICIDAL IDEATION: ICD-10-CM

## 2018-08-17 DIAGNOSIS — F10.920 ACUTE ALCOHOLIC INTOXICATION WITHOUT COMPLICATION (HCC): ICD-10-CM

## 2018-08-17 DIAGNOSIS — L03.90 CELLULITIS, UNSPECIFIED CELLULITIS SITE: Primary | ICD-10-CM

## 2018-08-17 LAB
ABSOLUTE EOS #: 0.08 K/UL (ref 0–0.44)
ABSOLUTE IMMATURE GRANULOCYTE: <0.03 K/UL (ref 0–0.3)
ABSOLUTE LYMPH #: 3.17 K/UL (ref 1.1–3.7)
ABSOLUTE MONO #: 0.55 K/UL (ref 0.1–1.2)
ANION GAP SERPL CALCULATED.3IONS-SCNC: 16 MMOL/L (ref 9–17)
BASOPHILS # BLD: 1 % (ref 0–2)
BASOPHILS ABSOLUTE: 0.08 K/UL (ref 0–0.2)
BUN BLDV-MCNC: 6 MG/DL (ref 6–20)
BUN/CREAT BLD: ABNORMAL (ref 9–20)
C-REACTIVE PROTEIN: 0.9 MG/L (ref 0–5)
CALCIUM SERPL-MCNC: 8.9 MG/DL (ref 8.6–10.4)
CHLORIDE BLD-SCNC: 103 MMOL/L (ref 98–107)
CO2: 21 MMOL/L (ref 20–31)
CREAT SERPL-MCNC: 0.63 MG/DL (ref 0.7–1.2)
DIFFERENTIAL TYPE: NORMAL
EOSINOPHILS RELATIVE PERCENT: 1 % (ref 1–4)
ETHANOL PERCENT: 0.37 %
ETHANOL: 366 MG/DL
GFR AFRICAN AMERICAN: >60 ML/MIN
GFR NON-AFRICAN AMERICAN: >60 ML/MIN
GFR SERPL CREATININE-BSD FRML MDRD: ABNORMAL ML/MIN/{1.73_M2}
GFR SERPL CREATININE-BSD FRML MDRD: ABNORMAL ML/MIN/{1.73_M2}
GLUCOSE BLD-MCNC: 98 MG/DL (ref 70–99)
HCT VFR BLD CALC: 46.2 % (ref 40.7–50.3)
HEMOGLOBIN: 15.2 G/DL (ref 13–17)
IMMATURE GRANULOCYTES: 0 %
LYMPHOCYTES # BLD: 36 % (ref 24–43)
MCH RBC QN AUTO: 29.6 PG (ref 25.2–33.5)
MCHC RBC AUTO-ENTMCNC: 32.9 G/DL (ref 28.4–34.8)
MCV RBC AUTO: 89.9 FL (ref 82.6–102.9)
MONOCYTES # BLD: 6 % (ref 3–12)
NRBC AUTOMATED: 0 PER 100 WBC
PDW BLD-RTO: 12.7 % (ref 11.8–14.4)
PLATELET # BLD: 171 K/UL (ref 138–453)
PLATELET ESTIMATE: NORMAL
PMV BLD AUTO: 10.5 FL (ref 8.1–13.5)
POTASSIUM SERPL-SCNC: 4.1 MMOL/L (ref 3.7–5.3)
RBC # BLD: 5.14 M/UL (ref 4.21–5.77)
RBC # BLD: NORMAL 10*6/UL
SEDIMENTATION RATE, ERYTHROCYTE: 20 MM (ref 0–10)
SEG NEUTROPHILS: 56 % (ref 36–65)
SEGMENTED NEUTROPHILS ABSOLUTE COUNT: 4.98 K/UL (ref 1.5–8.1)
SODIUM BLD-SCNC: 140 MMOL/L (ref 135–144)
WBC # BLD: 8.9 K/UL (ref 3.5–11.3)
WBC # BLD: NORMAL 10*3/UL

## 2018-08-17 PROCEDURE — 86140 C-REACTIVE PROTEIN: CPT

## 2018-08-17 PROCEDURE — 80048 BASIC METABOLIC PNL TOTAL CA: CPT

## 2018-08-17 PROCEDURE — G0480 DRUG TEST DEF 1-7 CLASSES: HCPCS

## 2018-08-17 PROCEDURE — 85025 COMPLETE CBC W/AUTO DIFF WBC: CPT

## 2018-08-17 PROCEDURE — 99285 EMERGENCY DEPT VISIT HI MDM: CPT

## 2018-08-17 PROCEDURE — 6370000000 HC RX 637 (ALT 250 FOR IP): Performed by: EMERGENCY MEDICINE

## 2018-08-17 PROCEDURE — 6370000000 HC RX 637 (ALT 250 FOR IP): Performed by: STUDENT IN AN ORGANIZED HEALTH CARE EDUCATION/TRAINING PROGRAM

## 2018-08-17 PROCEDURE — 73130 X-RAY EXAM OF HAND: CPT

## 2018-08-17 PROCEDURE — 85651 RBC SED RATE NONAUTOMATED: CPT

## 2018-08-17 RX ORDER — ALPRAZOLAM 0.25 MG/1
0.5 TABLET ORAL NIGHTLY PRN
Status: DISCONTINUED | OUTPATIENT
Start: 2018-08-17 | End: 2018-08-17

## 2018-08-17 RX ORDER — CEPHALEXIN 500 MG/1
500 CAPSULE ORAL ONCE
Status: COMPLETED | OUTPATIENT
Start: 2018-08-17 | End: 2018-08-17

## 2018-08-17 RX ORDER — SULFAMETHOXAZOLE AND TRIMETHOPRIM 800; 160 MG/1; MG/1
1 TABLET ORAL ONCE
Status: COMPLETED | OUTPATIENT
Start: 2018-08-17 | End: 2018-08-17

## 2018-08-17 RX ORDER — ALPRAZOLAM 0.25 MG/1
0.5 TABLET ORAL ONCE
Status: COMPLETED | OUTPATIENT
Start: 2018-08-17 | End: 2018-08-17

## 2018-08-17 RX ADMIN — ALPRAZOLAM 0.5 MG: 0.25 TABLET ORAL at 10:07

## 2018-08-17 RX ADMIN — CEPHALEXIN 500 MG: 500 CAPSULE ORAL at 06:04

## 2018-08-17 RX ADMIN — SULFAMETHOXAZOLE AND TRIMETHOPRIM 1 TABLET: 800; 160 TABLET ORAL at 06:04

## 2018-08-17 ASSESSMENT — ENCOUNTER SYMPTOMS
NAUSEA: 0
SHORTNESS OF BREATH: 0
COLOR CHANGE: 0
VOMITING: 0

## 2018-08-17 NOTE — ED NOTES
Pt sleeping in stretcher at this time, NAD noted. Pt easy to arouse.  Will continue to monitor      Aman Johnson RN  08/17/18 3205

## 2018-08-17 NOTE — ED NOTES
Pt to ED with c/o suicidal ideation with plan to hang self at a park. Pt states he has been sleeping outside the past few days with no where to stay. Pt states him and his wife have been arguing and he was kicked out. Pt states that he just cant handle life anymore. Pt states daily drinker with occasional cocaine use. Pt alert and oriented, pt appears intoxicated.  Will continue to monitor      Damien Hernadez RN  08/17/18 7414

## 2018-08-17 NOTE — ED NOTES
Pt refused to be suicidal at this time. Pt states he was drunk and emotional. Pt alert and oriented x4.  Steady stable gait      Cordell Burkitt, RN  08/17/18 1248

## 2018-08-17 NOTE — ED NOTES
Pt resting in stretcher.  Pt asking for something for his withdrawal. Resident notified      Jodi Rowe, LOVELY  08/17/18 0237

## 2018-08-17 NOTE — ED NOTES
belongings search:     Persons present during search:   Results of search and disposition:       Searchers Name: Security      These items or items similar should be removed from the room:   [x] Chairs   [x] Telephone   [x] Trash cans and liners   [x] Plastic utensils (order Patient Safety tray)   [x] Empty or remove Sharps containers   [x] All personal clothing/belongings removed   [x] All unnecessary lead wires, electrical cords, draw cords, etc.   [x] Flowers and plants   [x] Double check for lighters, matches, razors, any glass items etc that can be used as weapons. Person completing Checklist: [facility] INFORMATION     Y  N     [x] [] Has the patient been informed that they are on a watch and what that means? [x] [] Can the patient get out of Bed without nursing assistance? [x] [] Can the patient use the restroom without nursing assistance? [] [x] Can the patient walk the halls to [place] their legs? \"   [] [x] Does the patient have metal utensils? [x] [] Have the patient's belongings been placed out of control of the patient? [x] [] Have the patient and his/her belongings been checked for contraband? [x] [] Is the patient under any visitor restrictions? If Yes, explain:Suicidal ideation    [] [x] Is the patient under an alias? [facility] Name:   Authorized visitors (no more than two are to be on the list)   Name/Relationship:   Name/Relationship:    Name of Staff member that you  Received this information from?: Diana Osullivan RN    General Description:    Lu Hidalgo [id number] male 44 y.o. Admission weight: 190 lb (86.2 kg) Height: 5' 11\" (180.3 cm)  Race: [x]  [] Black  []   []   [] [place] [] Other  Facial Hair:  [] Yes  [] No  If yes, please describe: Identifying Marks (i.e. Visible tattoos, scars, etc... ):     NURSING CARE PLAN    Nursing Diagnosis: Risk of Self Directed Harm  [] Actual  [] Potential  Date Started: 8/17/18      Etiological Factors: (related to)  [x] Expressed or implied suicidal ideation/behavior  [x] Depression  [] Suicide attempt      [x] Low self-esteem  [] Hallucinations      [x] Feeling of Hopelessness  [x] Substance abuse or withdrawal    [] Dysfunctional family  [] Major traumatic event, eg., divorce, etc   [] Excessive stress/anxiety    8/17/18    Expected Outcomes    Patient will:   [x] Patient will remain safe for the duration of their stay   [x] Patient's environment will be safe, eg. Free of potential suicide weapons   [] Verbalize Recovery from suicidal episode and improvement in self-worth   [x] Discuss feeling that precipitated suicide attempt/thoughts/behavior   [] Will describe available resources for crisis prevention and management   [] Will verbalize positive coping skills     Nursing Intervention   [x] Assessment and Observations hourly   [x] Suicide Precautions implemented with patient, should be 1:1 observation   [x] Document observation [id number] and RN assessment hourly   [] Consult physician for:    [] Psychiatric consult    [] Pharmacological therapy    [] Other:    [x] Patient search completed by security   [x] Initiated appropriate safety protocols by removing from the patient's environment anything that could be used to inflict self injury, eg. Order safe tray, snap gown, etc   [x] Maintain open, warm, caring, non-judgmental attitude/manner towards patient   [] Discuss advantages and disadvantages of existing coping methods/skills   [x] Assist and educate patient with identifying present strengths and coping skills   [x] Keep patient informed regarding plan of care and provide clear concise explanations. Provide the patient/family education information as well as telephone numbers and other information about crisis centers, hot lines, and counselors.     Discharge Planning:   [] Referral  [] Groups [] Health agencies  [] Other:          Carmelo Hernadez RN  08/17/18 1349

## 2018-08-17 NOTE — ED PROVIDER NOTES
[facility] ED  Emergency Department  Emergency Medicine Resident Sign-out     Care of Mauro Adler was assumed from Dr. Julien Lau and is being seen for Suicidal  .  The patient's initial evaluation and plan have been discussed with the prior provider who initially evaluated the patient. EMERGENCY DEPARTMENT COURSE / MEDICAL DECISION MAKING:       MEDICATIONS GIVEN:  Orders Placed This Encounter   Medications    cephALEXin (KEFLEX) capsule 500 mg    sulfamethoxazole-trimethoprim (BACTRIM DS;SEPTRA DS) 800-160 MG per tablet 1 tablet       LABS / RADIOLOGY:     Labs Reviewed   SEDIMENTATION RATE - Abnormal; Notable for the following:        Result Value    Sed Rate 20 (*)     All other components within normal limits   ETHANOL - Abnormal; Notable for the following:     Ethanol 366 (*)     All other components within normal limits   BASIC METABOLIC PANEL - Abnormal; Notable for the following:     CREATININE 0.63 (*)     All other components within normal limits   CBC WITH AUTO DIFFERENTIAL   C-REACTIVE PROTEIN       Xr Hand Right (min 3 Views)    Result Date: 8/17/2018  EXAMINATION: 3 XRAY VIEWS OF THE RIGHT HAND 8/17/2018 4:39 am COMPARISON: 04/02/2018 HISTORY: ORDERING SYSTEM PROVIDED HISTORY: Recent infection TECHNOLOGIST PROVIDED HISTORY: Reason for exam:->Recent infection FINDINGS: There are cortical plates and screws along 3rd and 5th metacarpal shafts. 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 There is sclerotic change 5th metacarpal bone but no erosion. Remainder of osseous structures appear aligned and intact. Cortical plate and screws across nonunion of 5th metacarpal fracture. Sclerosis of the bone without erosion. Osteomyelitis not excluded. If there is persistent concern, MRI, three-phase bone scan or white blood cell scan can be performed. RECENT VITALS:     Temp: 98 °F (36.7 °C),  Pulse: 102, Resp: 14, BP: (!) 170/126, SpO2: 98 %    This patient is a 44 y.o.  Male with acute intoxication & SI. S/P infected right hand hardware. Pt restarted on keflex & bactrim for possible wound cellulitis. Labs w/o evidence of systemic involvement. Plans of suicide with hanging. Sober time 2 PM, re-eval for suicide. Patient denying any suicidal ideation upon reevaluation. Patient wanting to get rehab resources for drug abuse and alcohol abuse. We'll discharge patient with resources per social work. OUTSTANDING TASKS / RECOMMENDATIONS:    1. Pending re-eval.      FINAL IMPRESSION:     1. Cellulitis, unspecified cellulitis site    2. Acute alcoholic intoxication without complication ([facility]    3. Suicidal ideation        DISPOSITION:         DISPOSITION:  [x]  Discharge   []  Transfer -    []  Admission -     []  Against Medical Advice   []  Eloped   FOLLOW-UP: No follow-up provider specified.    DISCHARGE MEDICATIONS: New Prescriptions    No medications on file          Kimber Castanon MD  Emergency Medicine Resident  [address] MD  08/17/18 Evelyn Fields

## 2018-08-17 NOTE — ED PROVIDER NOTES
confusion and hallucinations. PHYSICAL EXAM   (up to 7 for level 4, 8 or more for level 5)      Initial Vitals   ED Triage Vitals [08/17/18 0354]   BP Temp Temp Source Pulse Resp SpO2 Height Weight   (!) 170/126 98 °F (36.7 °C) Oral 102 14 98 % 5' 11\" (1.803 m) 190 lb (86.2 kg)       Physical Exam   Constitutional: He appears well-developed and well-nourished. No distress. HENT:   Head: Normocephalic and atraumatic. Mouth/Throat: Oropharynx is clear and moist.   Eyes: Conjunctivae and EOM are normal. No scleral icterus. Neck: Normal range of motion. Neck supple. Cardiovascular: Normal rate, regular rhythm and normal heart sounds. Exam reveals no gallop and no friction rub. No murmur heard. 2+ Radial pulses with normal capillary refill to the right hand. Pulmonary/Chest: Effort normal and breath sounds normal. No respiratory distress. He has no wheezes. He has no rales. Abdominal: Soft. He exhibits no distension. There is no tenderness. There is no rebound and no guarding. Musculoskeletal: Normal range of motion. He exhibits no deformity. Patient has a wound along the lateral portion of the hyperthenar eminence on the right hand. There is 1 stitch in place in that it is an opening of the wound with healing wound underneath. There is some surrounding erythema. No tenderness. No crepitus. Neurological: Coordination normal.   Alert and oriented, but slurred speech   Skin: Skin is warm and dry. He is not diaphoretic. See MSK exam   Psychiatric:   Suicidal ideation + plan   Nursing note and vitals reviewed. DIFFERENTIAL DIAGNOSIS/IMPRESSION     DDX: Suicidal ideations, alcohol intoxication, cocaine abuse, healing wound, cellulitis, osteomyelitis     Impression: 44 y.o. male who presents with alcohol intoxication, cocaine use, and suicidal ideations. Patient does have a wound on the right hand with a history of MRSA infection has been off his antibiotics.   There is minimal persistent concern, MRI, three-phase bone scan or white blood cell scan can be performed. BEDSIDE ULTRASOUND:  Not clinically indicated at this time. ED COURSE      ED Medication Orders     Start Ordered     Status Ordering Provider    08/17/18 0600 08/17/18 0545  cephALEXin (KEFLEX) capsule 500 mg  ONCE      Last MAR action:  Given - by Melissa Heading on 08/17/18 at [facility], [facility]ger Reji E    08/17/18 0600 08/17/18 0545  sulfamethoxazole-trimethoprim (BACTRIM DS;SEPTRA DS) 800-160 MG per tablet 1 tablet  ONCE      Last MAR action:  Given - by Melissa Heading on 08/17/18 [facility]  ED Course as of Aug 17 0714   Fri Aug 17, 2018   9819 Patient alcohol level is 366. [CK]   0604 Patients other records were obtained. His wound cultures were positive for MRSA. We'll restart him on Keflex and Bactrim. Does not appear to have any soft tissue gas surrounding the area, do not feel that this is an osteomyelitis. Patient doesn't a mildly elevated sed rate, but this appears to be decreasing from prior ESR's. Patient's white count CRP are negative. Therefore once patient's alcohol level has reached a sober limit, we will have social work reevaluate. This will be approximately 2 PM.  [CK]   1936 Patient care signed out to Dr. Red Lewis MD       PROCEDURES:  None     CONSULTS:  None    CRITICAL CARE:  0 min, please also see attending note    FINAL IMPRESSION      1. Cellulitis, unspecified cellulitis site    2. Acute alcoholic intoxication without complication ([facility]    3. Suicidal ideation          DISPOSITION / PLAN     DISPOSITION  - Pending re-evaluation once sober. PATIENT REFERRED TO:  No follow-up provider specified.     DISCHARGE MEDICATIONS:  New Prescriptions    No medications on file       Nieves Tolentino MD  Emergency Medicine Resident    (Please note that portions of this note were

## 2018-08-28 ENCOUNTER — OFFICE VISIT (OUTPATIENT)
Dept: FAMILY MEDICINE CLINIC | Age: 43
End: 2018-08-28
Payer: MEDICARE

## 2018-08-28 VITALS
DIASTOLIC BLOOD PRESSURE: 77 MMHG | HEART RATE: 89 BPM | SYSTOLIC BLOOD PRESSURE: 117 MMHG | HEIGHT: 71 IN | BODY MASS INDEX: 26.04 KG/M2 | WEIGHT: 186 LBS | TEMPERATURE: 97.6 F

## 2018-08-28 DIAGNOSIS — F10.10 ALCOHOL ABUSE: Primary | ICD-10-CM

## 2018-08-28 DIAGNOSIS — L03.119 CELLULITIS OF HAND: ICD-10-CM

## 2018-08-28 PROCEDURE — G8417 CALC BMI ABV UP PARAM F/U: HCPCS | Performed by: STUDENT IN AN ORGANIZED HEALTH CARE EDUCATION/TRAINING PROGRAM

## 2018-08-28 PROCEDURE — 4004F PT TOBACCO SCREEN RCVD TLK: CPT | Performed by: STUDENT IN AN ORGANIZED HEALTH CARE EDUCATION/TRAINING PROGRAM

## 2018-08-28 PROCEDURE — 99213 OFFICE O/P EST LOW 20 MIN: CPT | Performed by: STUDENT IN AN ORGANIZED HEALTH CARE EDUCATION/TRAINING PROGRAM

## 2018-08-28 PROCEDURE — G8427 DOCREV CUR MEDS BY ELIG CLIN: HCPCS | Performed by: STUDENT IN AN ORGANIZED HEALTH CARE EDUCATION/TRAINING PROGRAM

## 2018-08-28 ASSESSMENT — ENCOUNTER SYMPTOMS
VOMITING: 0
CONSTIPATION: 0
CHEST TIGHTNESS: 0
SHORTNESS OF BREATH: 0
COUGH: 0
ABDOMINAL PAIN: 1
DIARRHEA: 0
BLOOD IN STOOL: 0
NAUSEA: 0
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
## 2018-08-28 NOTE — PATIENT INSTRUCTIONS
Thank you for letting us take care of you today. We hope all your questions were addressed. If a question was overlooked or something else comes to mind after you return home, please contact a member of your Care Team listed below. Please make sure you have a routine office visit set up to follow-up on [address]. Your Care Team at [place] [address] is Team #4  Perri Mobley MD (Faculty)  Chilo Herrera MD (Indira Stinson MD (Resident)  Cynthia Gutiérrez MD (Resident)  Esha Mueller MD (Resident)  Salvador Gudino MD (Resident)  Luis Miguel Borden MD (Resident)  RICK Bell., RORY Lira., RORY Vasquez ([facility])  Ryan Thurman RN, ([address] [facility])  Frederic Flores, Ph.D., (Behavioral Services)  Margarito Casillas, [address] [place] [address] (Clinical Pharmacist)       Office phone number: [phone]    If you need to get in right away due to illness, please be advised we have \"Same Day\" appointments available Monday-Friday. Please call us at [phone] option #3 to schedule your \"Same Day\" appointment.

## 2018-10-27 DIAGNOSIS — K21.9 GASTROESOPHAGEAL REFLUX DISEASE, ESOPHAGITIS PRESENCE NOT SPECIFIED: ICD-10-CM

## 2018-10-29 RX ORDER — OMEPRAZOLE 20 MG/1
CAPSULE, DELAYED RELEASE ORAL
Qty: 30 CAPSULE | Refills: 3 | Status: SHIPPED | OUTPATIENT
Start: 2018-10-29 | End: 2019-02-11 | Stop reason: SDUPTHER

## 2018-11-08 DIAGNOSIS — G25.81 RESTLESS LEG SYNDROME: ICD-10-CM

## 2018-11-08 RX ORDER — ROPINIROLE 1 MG/1
TABLET, FILM COATED ORAL
Qty: 30 TABLET | Refills: 3 | Status: SHIPPED | OUTPATIENT
Start: 2018-11-08 | End: 2019-03-04 | Stop reason: SDUPTHER

## 2019-01-03 RX ORDER — IBUPROFEN 800 MG/1
TABLET ORAL
Qty: 120 TABLET | Refills: 0 | Status: ON HOLD | OUTPATIENT
Start: 2019-01-03 | End: 2019-03-26

## 2019-02-10 ENCOUNTER — APPOINTMENT (OUTPATIENT)
Dept: CT IMAGING | Age: 44
End: 2019-02-10
Payer: MEDICARE

## 2019-02-10 ENCOUNTER — HOSPITAL ENCOUNTER (EMERGENCY)
Age: 44
Discharge: HOME | End: 2019-02-10
Attending: EMERGENCY MEDICINE
Payer: MEDICARE

## 2019-02-10 VITALS
BODY MASS INDEX: 25.77 KG/M2 | DIASTOLIC BLOOD PRESSURE: 67 MMHG | TEMPERATURE: 98 F | RESPIRATION RATE: 16 BRPM | OXYGEN SATURATION: 96 % | SYSTOLIC BLOOD PRESSURE: 164 MMHG | HEART RATE: 101 BPM | WEIGHT: 180 LBS | HEIGHT: 70 IN

## 2019-02-10 DIAGNOSIS — S09.90XA CLOSED HEAD INJURY, INITIAL ENCOUNTER: Primary | ICD-10-CM

## 2019-02-10 LAB
ABSOLUTE EOS #: 0.14 K/UL (ref 0–0.44)
ABSOLUTE IMMATURE GRANULOCYTE: <0.03 K/UL (ref 0–0.3)
ABSOLUTE LYMPH #: 3.9 K/UL (ref 1.1–3.7)
ABSOLUTE MONO #: 0.57 K/UL (ref 0.1–1.2)
ANION GAP SERPL CALCULATED.3IONS-SCNC: 16 MMOL/L (ref 9–17)
BASOPHILS # BLD: 1 % (ref 0–2)
BASOPHILS ABSOLUTE: 0.04 K/UL (ref 0–0.2)
BUN BLDV-MCNC: 9 MG/DL (ref 6–20)
BUN/CREAT BLD: NORMAL (ref 9–20)
CALCIUM SERPL-MCNC: 8.7 MG/DL (ref 8.6–10.4)
CHLORIDE BLD-SCNC: 104 MMOL/L (ref 98–107)
CO2: 22 MMOL/L (ref 20–31)
CREAT SERPL-MCNC: 0.77 MG/DL (ref 0.7–1.2)
DIFFERENTIAL TYPE: ABNORMAL
EOSINOPHILS RELATIVE PERCENT: 2 % (ref 1–4)
GFR AFRICAN AMERICAN: >60 ML/MIN
GFR NON-AFRICAN AMERICAN: >60 ML/MIN
GFR SERPL CREATININE-BSD FRML MDRD: NORMAL ML/MIN/{1.73_M2}
GFR SERPL CREATININE-BSD FRML MDRD: NORMAL ML/MIN/{1.73_M2}
GLUCOSE BLD-MCNC: 81 MG/DL (ref 70–99)
HCT VFR BLD CALC: 43.6 % (ref 40.7–50.3)
HEMOGLOBIN: 14.7 G/DL (ref 13–17)
IMMATURE GRANULOCYTES: 0 %
INR BLD: 1
LYMPHOCYTES # BLD: 49 % (ref 24–43)
MCH RBC QN AUTO: 29.6 PG (ref 25.2–33.5)
MCHC RBC AUTO-ENTMCNC: 33.7 G/DL (ref 28.4–34.8)
MCV RBC AUTO: 87.7 FL (ref 82.6–102.9)
MONOCYTES # BLD: 7 % (ref 3–12)
NRBC AUTOMATED: 0 PER 100 WBC
PDW BLD-RTO: 13.6 % (ref 11.8–14.4)
PLATELET # BLD: 156 K/UL (ref 138–453)
PLATELET ESTIMATE: ABNORMAL
PMV BLD AUTO: 11.7 FL (ref 8.1–13.5)
POTASSIUM SERPL-SCNC: 4.1 MMOL/L (ref 3.7–5.3)
PROTHROMBIN TIME: 10.5 SEC (ref 9–12)
RBC # BLD: 4.97 M/UL (ref 4.21–5.77)
RBC # BLD: ABNORMAL 10*6/UL
SEG NEUTROPHILS: 41 % (ref 36–65)
SEGMENTED NEUTROPHILS ABSOLUTE COUNT: 3.29 K/UL (ref 1.5–8.1)
SODIUM BLD-SCNC: 142 MMOL/L (ref 135–144)
WBC # BLD: 8 K/UL (ref 3.5–11.3)
WBC # BLD: ABNORMAL 10*3/UL

## 2019-02-10 PROCEDURE — G0383 LEV 4 HOSP TYPE B ED VISIT: HCPCS

## 2019-02-10 PROCEDURE — 93005 ELECTROCARDIOGRAM TRACING: CPT

## 2019-02-10 PROCEDURE — 70450 CT HEAD/BRAIN W/O DYE: CPT

## 2019-02-10 PROCEDURE — 85610 PROTHROMBIN TIME: CPT

## 2019-02-10 PROCEDURE — 80048 BASIC METABOLIC PNL TOTAL CA: CPT

## 2019-02-10 PROCEDURE — 6370000000 HC RX 637 (ALT 250 FOR IP): Performed by: STUDENT IN AN ORGANIZED HEALTH CARE EDUCATION/TRAINING PROGRAM

## 2019-02-10 PROCEDURE — 85025 COMPLETE CBC W/AUTO DIFF WBC: CPT

## 2019-02-10 PROCEDURE — 72125 CT NECK SPINE W/O DYE: CPT

## 2019-02-10 RX ORDER — THIAMINE MONONITRATE (VIT B1) 100 MG
100 TABLET ORAL ONCE
Status: COMPLETED | OUTPATIENT
Start: 2019-02-10 | End: 2019-02-10

## 2019-02-10 RX ORDER — IBUPROFEN 400 MG/1
400 TABLET ORAL ONCE
Status: COMPLETED | OUTPATIENT
Start: 2019-02-10 | End: 2019-02-10

## 2019-02-10 RX ORDER — 0.9 % SODIUM CHLORIDE 0.9 %
1000 INTRAVENOUS SOLUTION INTRAVENOUS ONCE
Status: DISCONTINUED | OUTPATIENT
Start: 2019-02-10 | End: 2019-02-11 | Stop reason: HOSPADM

## 2019-02-10 RX ADMIN — Medication 100 MG: at 22:29

## 2019-02-10 RX ADMIN — IBUPROFEN 400 MG: 400 TABLET, FILM COATED ORAL at 22:29

## 2019-02-10 ASSESSMENT — ENCOUNTER SYMPTOMS
ABDOMINAL DISTENTION: 0
CHOKING: 0
TROUBLE SWALLOWING: 0
SHORTNESS OF BREATH: 0
EYE ITCHING: 0
EYE DISCHARGE: 0
ABDOMINAL PAIN: 0
RHINORRHEA: 0
CHEST TIGHTNESS: 0
WHEEZING: 0
SINUS PRESSURE: 0
VOICE CHANGE: 0
VOMITING: 1
BACK PAIN: 0
FACIAL SWELLING: 0
CONSTIPATION: 0
STRIDOR: 0
EYE PAIN: 1
SINUS PAIN: 0
EYE REDNESS: 0
DIARRHEA: 0
PHOTOPHOBIA: 0
COUGH: 0
SORE THROAT: 0

## 2019-02-10 ASSESSMENT — PAIN DESCRIPTION - PAIN TYPE: TYPE: ACUTE PAIN

## 2019-02-10 ASSESSMENT — PAIN SCALES - GENERAL: PAINLEVEL_OUTOF10: 4

## 2019-02-10 ASSESSMENT — PAIN DESCRIPTION - LOCATION: LOCATION: CHEST

## 2019-02-11 ENCOUNTER — TELEPHONE (OUTPATIENT)
Dept: FAMILY MEDICINE CLINIC | Age: 44
End: 2019-02-11

## 2019-02-11 DIAGNOSIS — K21.9 GASTROESOPHAGEAL REFLUX DISEASE, ESOPHAGITIS PRESENCE NOT SPECIFIED: ICD-10-CM

## 2019-02-11 RX ORDER — OMEPRAZOLE 20 MG/1
CAPSULE, DELAYED RELEASE ORAL
Qty: 30 CAPSULE | Refills: 1 | Status: SHIPPED | OUTPATIENT
Start: 2019-02-11 | End: 2019-04-15 | Stop reason: SDUPTHER

## 2019-02-12 LAB
EKG ATRIAL RATE: 97 BPM
EKG P AXIS: 43 DEGREES
EKG P-R INTERVAL: 148 MS
EKG Q-T INTERVAL: 344 MS
EKG QRS DURATION: 94 MS
EKG QTC CALCULATION (BAZETT): 436 MS
EKG R AXIS: 10 DEGREES
EKG T AXIS: 29 DEGREES
EKG VENTRICULAR RATE: 97 BPM

## 2019-03-04 DIAGNOSIS — G25.81 RESTLESS LEG SYNDROME: ICD-10-CM

## 2019-03-05 RX ORDER — ROPINIROLE 1 MG/1
TABLET, FILM COATED ORAL
Qty: 30 TABLET | Refills: 1 | Status: ON HOLD | OUTPATIENT
Start: 2019-03-05 | End: 2019-03-27 | Stop reason: HOSPADM

## 2019-03-23 ENCOUNTER — HOSPITAL ENCOUNTER (INPATIENT)
Age: 44
LOS: 6 days | Discharge: HOME | DRG: 753 | End: 2019-03-29
Attending: EMERGENCY MEDICINE | Admitting: PSYCHIATRY & NEUROLOGY
Payer: MEDICARE

## 2019-03-23 ENCOUNTER — APPOINTMENT (OUTPATIENT)
Dept: GENERAL RADIOLOGY | Age: 44
DRG: 753 | End: 2019-03-23
Payer: MEDICARE

## 2019-03-23 DIAGNOSIS — R45.851 SUICIDAL IDEATION: Primary | ICD-10-CM

## 2019-03-23 DIAGNOSIS — F10.920 ACUTE ALCOHOLIC INTOXICATION WITHOUT COMPLICATION (HCC): ICD-10-CM

## 2019-03-23 PROBLEM — F31.9 BIPOLAR 1 DISORDER (HCC): Status: ACTIVE | Noted: 2019-03-23

## 2019-03-23 LAB
ABSOLUTE EOS #: 0.1 K/UL (ref 0–0.4)
ABSOLUTE IMMATURE GRANULOCYTE: ABNORMAL K/UL (ref 0–0.3)
ABSOLUTE LYMPH #: 2.5 K/UL (ref 1–4.8)
ABSOLUTE MONO #: 0.5 K/UL (ref 0.1–1.3)
ACETAMINOPHEN LEVEL: <5 UG/ML (ref 10–30)
ALBUMIN SERPL-MCNC: 4.6 G/DL (ref 3.5–5.2)
ALBUMIN/GLOBULIN RATIO: ABNORMAL (ref 1–2.5)
ALP BLD-CCNC: 89 U/L (ref 40–129)
ALT SERPL-CCNC: 30 U/L (ref 5–41)
ANION GAP SERPL CALCULATED.3IONS-SCNC: 16 MMOL/L (ref 9–17)
AST SERPL-CCNC: 65 U/L
BASOPHILS # BLD: 1 % (ref 0–2)
BASOPHILS ABSOLUTE: 0 K/UL (ref 0–0.2)
BILIRUB SERPL-MCNC: 0.39 MG/DL (ref 0.3–1.2)
BUN BLDV-MCNC: 9 MG/DL (ref 6–20)
BUN/CREAT BLD: ABNORMAL (ref 9–20)
CALCIUM SERPL-MCNC: 9.1 MG/DL (ref 8.6–10.4)
CHLORIDE BLD-SCNC: 101 MMOL/L (ref 98–107)
CO2: 22 MMOL/L (ref 20–31)
CREAT SERPL-MCNC: 0.67 MG/DL (ref 0.7–1.2)
DIFFERENTIAL TYPE: ABNORMAL
EOSINOPHILS RELATIVE PERCENT: 1 % (ref 0–4)
ETHANOL PERCENT: 0.33 %
ETHANOL: 327 MG/DL
GFR AFRICAN AMERICAN: >60 ML/MIN
GFR NON-AFRICAN AMERICAN: >60 ML/MIN
GFR SERPL CREATININE-BSD FRML MDRD: ABNORMAL ML/MIN/{1.73_M2}
GFR SERPL CREATININE-BSD FRML MDRD: ABNORMAL ML/MIN/{1.73_M2}
GLUCOSE BLD-MCNC: 95 MG/DL (ref 70–99)
HCT VFR BLD CALC: 44.3 % (ref 41–53)
HEMOGLOBIN: 14.8 G/DL (ref 13.5–17.5)
IMMATURE GRANULOCYTES: ABNORMAL %
LIPASE: 50 U/L (ref 13–60)
LYMPHOCYTES # BLD: 39 % (ref 24–44)
MCH RBC QN AUTO: 28.9 PG (ref 26–34)
MCHC RBC AUTO-ENTMCNC: 33.3 G/DL (ref 31–37)
MCV RBC AUTO: 86.7 FL (ref 80–100)
MONOCYTES # BLD: 8 % (ref 1–7)
NRBC AUTOMATED: ABNORMAL PER 100 WBC
PDW BLD-RTO: 14.6 % (ref 11.5–14.9)
PLATELET # BLD: 166 K/UL (ref 150–450)
PLATELET ESTIMATE: ABNORMAL
PMV BLD AUTO: 9 FL (ref 6–12)
POTASSIUM SERPL-SCNC: 4.1 MMOL/L (ref 3.7–5.3)
RBC # BLD: 5.11 M/UL (ref 4.5–5.9)
RBC # BLD: ABNORMAL 10*6/UL
SALICYLATE LEVEL: <1 MG/DL (ref 3–10)
SEG NEUTROPHILS: 51 % (ref 36–66)
SEGMENTED NEUTROPHILS ABSOLUTE COUNT: 3.3 K/UL (ref 1.3–9.1)
SODIUM BLD-SCNC: 139 MMOL/L (ref 135–144)
TOTAL PROTEIN: 8.5 G/DL (ref 6.4–8.3)
TOXIC TRICYCLIC SC,BLOOD: ABNORMAL
WBC # BLD: 6.4 K/UL (ref 3.5–11)
WBC # BLD: ABNORMAL 10*3/UL

## 2019-03-23 PROCEDURE — 6370000000 HC RX 637 (ALT 250 FOR IP): Performed by: NURSE PRACTITIONER

## 2019-03-23 PROCEDURE — G0480 DRUG TEST DEF 1-7 CLASSES: HCPCS

## 2019-03-23 PROCEDURE — 80307 DRUG TEST PRSMV CHEM ANLYZR: CPT

## 2019-03-23 PROCEDURE — 1240000000 HC EMOTIONAL WELLNESS R&B

## 2019-03-23 PROCEDURE — 83690 ASSAY OF LIPASE: CPT

## 2019-03-23 PROCEDURE — 36415 COLL VENOUS BLD VENIPUNCTURE: CPT

## 2019-03-23 PROCEDURE — 99285 EMERGENCY DEPT VISIT HI MDM: CPT

## 2019-03-23 PROCEDURE — 6370000000 HC RX 637 (ALT 250 FOR IP): Performed by: EMERGENCY MEDICINE

## 2019-03-23 PROCEDURE — 80053 COMPREHEN METABOLIC PANEL: CPT

## 2019-03-23 PROCEDURE — 73130 X-RAY EXAM OF HAND: CPT

## 2019-03-23 PROCEDURE — 85025 COMPLETE CBC W/AUTO DIFF WBC: CPT

## 2019-03-23 RX ORDER — IBUPROFEN 800 MG/1
800 TABLET ORAL EVERY 8 HOURS PRN
Status: DISCONTINUED | OUTPATIENT
Start: 2019-03-23 | End: 2019-03-29 | Stop reason: HOSPADM

## 2019-03-23 RX ORDER — PANTOPRAZOLE SODIUM 40 MG/1
40 TABLET, DELAYED RELEASE ORAL
Status: DISCONTINUED | OUTPATIENT
Start: 2019-03-24 | End: 2019-03-29 | Stop reason: HOSPADM

## 2019-03-23 RX ORDER — NICOTINE 21 MG/24HR
1 PATCH, TRANSDERMAL 24 HOURS TRANSDERMAL DAILY
Status: DISCONTINUED | OUTPATIENT
Start: 2019-03-23 | End: 2019-03-29 | Stop reason: HOSPADM

## 2019-03-23 RX ORDER — LORAZEPAM 0.5 MG/1
2 TABLET ORAL EVERY 4 HOURS PRN
Status: DISCONTINUED | OUTPATIENT
Start: 2019-03-23 | End: 2019-03-24

## 2019-03-23 RX ORDER — OLANZAPINE 10 MG/1
10 INJECTION, POWDER, LYOPHILIZED, FOR SOLUTION INTRAMUSCULAR ONCE
Status: DISCONTINUED | OUTPATIENT
Start: 2019-03-23 | End: 2019-03-23

## 2019-03-23 RX ORDER — BENZTROPINE MESYLATE 1 MG/ML
2 INJECTION INTRAMUSCULAR; INTRAVENOUS 2 TIMES DAILY PRN
Status: DISCONTINUED | OUTPATIENT
Start: 2019-03-23 | End: 2019-03-29 | Stop reason: HOSPADM

## 2019-03-23 RX ORDER — ROPINIROLE 1 MG/1
1 TABLET, FILM COATED ORAL NIGHTLY
Status: DISCONTINUED | OUTPATIENT
Start: 2019-03-23 | End: 2019-03-25

## 2019-03-23 RX ORDER — LORAZEPAM 1 MG/1
2 TABLET ORAL ONCE
Status: DISCONTINUED | OUTPATIENT
Start: 2019-03-23 | End: 2019-03-24

## 2019-03-23 RX ORDER — TRAZODONE HYDROCHLORIDE 50 MG/1
50 TABLET ORAL NIGHTLY PRN
Status: DISCONTINUED | OUTPATIENT
Start: 2019-03-23 | End: 2019-03-29 | Stop reason: HOSPADM

## 2019-03-23 RX ORDER — LORAZEPAM 1 MG/1
2 TABLET ORAL ONCE
Status: COMPLETED | OUTPATIENT
Start: 2019-03-23 | End: 2019-03-23

## 2019-03-23 RX ORDER — MAGNESIUM HYDROXIDE/ALUMINUM HYDROXICE/SIMETHICONE 120; 1200; 1200 MG/30ML; MG/30ML; MG/30ML
30 SUSPENSION ORAL EVERY 6 HOURS PRN
Status: DISCONTINUED | OUTPATIENT
Start: 2019-03-23 | End: 2019-03-29 | Stop reason: HOSPADM

## 2019-03-23 RX ORDER — VALACYCLOVIR HYDROCHLORIDE 500 MG/1
1000 TABLET, FILM COATED ORAL DAILY
Status: DISCONTINUED | OUTPATIENT
Start: 2019-03-24 | End: 2019-03-29 | Stop reason: HOSPADM

## 2019-03-23 RX ORDER — HYDROXYZINE HYDROCHLORIDE 25 MG/1
25 TABLET, FILM COATED ORAL 3 TIMES DAILY PRN
Status: DISCONTINUED | OUTPATIENT
Start: 2019-03-23 | End: 2019-03-29 | Stop reason: HOSPADM

## 2019-03-23 RX ADMIN — ROPINIROLE HYDROCHLORIDE 1 MG: 1 TABLET, FILM COATED ORAL at 21:11

## 2019-03-23 RX ADMIN — TRAZODONE HYDROCHLORIDE 50 MG: 50 TABLET ORAL at 21:11

## 2019-03-23 RX ADMIN — LORAZEPAM 2 MG: 0.5 TABLET ORAL at 22:07

## 2019-03-23 RX ADMIN — LORAZEPAM 2 MG: 0.5 TABLET ORAL at 17:58

## 2019-03-23 RX ADMIN — LORAZEPAM 2 MG: 1 TABLET ORAL at 11:12

## 2019-03-23 ASSESSMENT — SLEEP AND FATIGUE QUESTIONNAIRES
AVERAGE NUMBER OF SLEEP HOURS: 4
DIFFICULTY ARISING: NO
SLEEP PATTERN: DIFFICULTY FALLING ASLEEP;RESTLESSNESS;INSOMNIA
RESTFUL SLEEP: NO
DIFFICULTY FALLING ASLEEP: YES
DO YOU USE A SLEEP AID: NO
DIFFICULTY STAYING ASLEEP: YES
DIFFICULTY FALLING ASLEEP: YES
DO YOU HAVE DIFFICULTY SLEEPING: YES
DO YOU HAVE DIFFICULTY SLEEPING: YES
RESTFUL SLEEP: NO
AVERAGE NUMBER OF SLEEP HOURS: 4
DIFFICULTY STAYING ASLEEP: YES
DIFFICULTY ARISING: NO

## 2019-03-23 ASSESSMENT — LIFESTYLE VARIABLES
HISTORY_ALCOHOL_USE: YES
HISTORY_ALCOHOL_USE: YES

## 2019-03-23 NOTE — ED NOTES
Patient resting on ED cot. Writer updated patient on plan of care an re-evaluation to occur when he becomes sober. Patient verbalized understanding. Patient gave verbal permission to contact his wife and update her.
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
## 2019-03-23 NOTE — CARE COORDINATION
BHI Biopsychosocial Assessment    Current Level of Psychosocial Functioning     Independent x  Dependent    Minimal Assist     Comments:    Psychosocial High Risk Factors (check all that apply)    Unable to obtain meds   Chronic illness/pain    Substance abuse  x  Lack of Family Support   Financial stress   Isolation   Inadequate Community Resources  Suicide attempt(s)  Not taking medications x  Victim of crime   Developmental Delay  Unable to manage personal needs    Age 73 or older   Homeless  No transportation   Readmission within 30 days  Unemployment x  Traumatic Event    Comments:   Psychiatric Advanced Directives: patient denies     Family to Involve in Treatment: patient reports his GF is supportive     Sexual Orientation:  Pt identifies as heterosexual.     Patient Strengths: Patient reports he has stable housing, supportive GF     Patient Barriers: pt reports ongoing AOD use including alcohol and powder cocaine       Opiate Education Provided:  Pt reports history of alcohol and power cocaine abuse       CMHC/mental health history: Patient reports he was linked with [facility]. Plan of Care   medication management, group/individual therapies, family meetings, psycho -education, treatment team meetings to assist with stabilization    Initial Discharge Plan:  Patient reports he lives with GF but he is considering d/c to [facility] at d/c.       Clinical Summary:  Phil Thomas is a 38year old single White male who has been admitted to [place] [id number] with report of increase of suicidal ideation, although patient denies suicidal ideation denies history of suicidal ideation during this assessment. Patient reports history of depression, including sleep disturbance, low energy, poor mood. Patient reports he lives with his GF who is supportive but with whom he has frequent arguments. Pt reports history of alcohol abuse, reports occasional use of powder cocaine.  Pt reports he is considering inpatient treatment at [facility]. SW offered ongoing support and encouragement, referral sent to CD counselor for additional patient support with AOD treatment resources.

## 2019-03-23 NOTE — ED NOTES
Provisional Diagnosis:   Bipolar Disorder    Psychosocial and Contextual Factors:   Patient brought in by his wife due to having suicidal ideation and writing suicide letters. Patient reports he is a daily alcohol drinker. Patient reports he has been off psychiatric medications for the last few years. C-SSRS Summary:      Patient: X  Family:   Agency:     Substance Abuse: Patient reports daily alcohol use and a history of cocaine use. Present Suicidal Behavior:  Patient reports suicidal ideation with multiple different plans such as hanging himself at [address], or having the police shoot him by drawing a gun. Patient and his wife report patient has been writing suicide letter and saying goodbye for the last couple of days. Verbal: X    Attempt:    Past Suicidal Behavior: Patient denies past suicide attempts. Verbal:    Attempt:      Self-Injurious/Self-Mutilation:Patient reports he used a stake knife to superficially cut his body a few days ago. Trauma Identified:  Patient reports his children have stopped talking to him which has been traumatic for him. Protective Factors:    Patient reports a previous history of being linked with the [facility]. Patient reports Alcohol use. Risk Factors:    Patient is not currently in outpatient mental health services. Clinical Summary:    Thom Rodríguez is a 38 y.o. male who presents to the ED by his girlfriend after patient was suicidal with a plan to hang himself, as well as with a plan to  [facility] on the police\" to have them shoot him. Per patients wife Gaby Franco patient has been making comments for the last few days saying to look for him at [address] where he planned on hanging himself. Patient brought in by his girlfriend. Upon arrival patient was intoxicated. Patient admits to drinking 20 beers. Social Work Assessment was completed after patient became sober. Patient endorsed fleeting suicidal ideation.  Patient states he has been writing goodbye suicide letter for the last couple of days. Patient reports he is a daily alcohol drinker and states he typically drinks 12-16 beers a day. Patient reports cocaine use about 2 times a week. Patient reports he has been off psychiatric medications and treatment for a few years. Patient reports increase in depressive symptoms over the last few weeks. Patient reports his children no longer wishing to talk to him has been a trigger to his depressive symptoms. Per patients wife, yesterday while driving him to the ED patient states \"People in my head are telling me to grab and turn the wheel and take you with me. \" Patient denies auditory or visual hallucinations and states he has just been arguing with his own thoughts. Patient reports daily poor sleep and poor appetite. Patient reports he used a stake knife to superficially cut his body a few days ago. Patient states he does this to [facility]. \" Patient reports he has been feeling irritable and angry at home recently. Level of Care Disposition:    Writer consulted with Jeison Rodrigez NP who recommends inpatient psychiatric admission. Patient is in agreement. Dr. Joellen Barton is admitting physician.        Insurance Precertification Authorization:
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
## 2019-03-23 NOTE — ED NOTES
Patient states he is suicidal, patient has plan to hang himself. States he feels angry. Denies homicidal ideation. Patient is on no meds. [facility] [place]. Hx of bipolar and anxiety. Hx of alcohol abuse- drinks 20 beers per day- last drink was in the parking lot. Hx of cocaine abuse and last snorted cocaine a few hours ago. Patient states he cuts himself, states the other day he used a steak knife to put superficial cuts on his body.       Ayaz Nickerson RN  03/23/19 [id number]

## 2019-03-23 NOTE — BH NOTE
Patient given tobacco quitline number 66185782077 at this time, refusing to call at this time, states \" I just dont want to quit now\"- patient given information as to the dangers of long term tobacco use. Continue to reinforce the importance of tobacco cessation.

## 2019-03-24 LAB
CHOLESTEROL/HDL RATIO: 2.6
CHOLESTEROL: 235 MG/DL
ESTIMATED AVERAGE GLUCOSE: 114 MG/DL
HBA1C MFR BLD: 5.6 % (ref 4–6)
HDLC SERPL-MCNC: 90 MG/DL
LDL CHOLESTEROL: 131 MG/DL (ref 0–130)
THYROXINE, FREE: 0.81 NG/DL (ref 0.93–1.7)
TRIGL SERPL-MCNC: 71 MG/DL
TSH SERPL DL<=0.05 MIU/L-ACNC: 1.83 MIU/L (ref 0.3–5)
VLDLC SERPL CALC-MCNC: ABNORMAL MG/DL (ref 1–30)

## 2019-03-24 PROCEDURE — 1240000000 HC EMOTIONAL WELLNESS R&B

## 2019-03-24 PROCEDURE — 6370000000 HC RX 637 (ALT 250 FOR IP): Performed by: NURSE PRACTITIONER

## 2019-03-24 PROCEDURE — 36415 COLL VENOUS BLD VENIPUNCTURE: CPT

## 2019-03-24 PROCEDURE — 83036 HEMOGLOBIN GLYCOSYLATED A1C: CPT

## 2019-03-24 PROCEDURE — 80061 LIPID PANEL: CPT

## 2019-03-24 PROCEDURE — 84443 ASSAY THYROID STIM HORMONE: CPT

## 2019-03-24 PROCEDURE — 84439 ASSAY OF FREE THYROXINE: CPT

## 2019-03-24 PROCEDURE — 90792 PSYCH DIAG EVAL W/MED SRVCS: CPT | Performed by: NURSE PRACTITIONER

## 2019-03-24 RX ORDER — LORAZEPAM 1 MG/1
2 TABLET ORAL EVERY 4 HOURS PRN
Status: DISCONTINUED | OUTPATIENT
Start: 2019-03-24 | End: 2019-03-29 | Stop reason: HOSPADM

## 2019-03-24 RX ORDER — THIAMINE MONONITRATE (VIT B1) 100 MG
100 TABLET ORAL DAILY
Status: DISCONTINUED | OUTPATIENT
Start: 2019-03-24 | End: 2019-03-29 | Stop reason: HOSPADM

## 2019-03-24 RX ORDER — FOLIC ACID 1 MG/1
1 TABLET ORAL DAILY
Status: DISCONTINUED | OUTPATIENT
Start: 2019-03-24 | End: 2019-03-29 | Stop reason: HOSPADM

## 2019-03-24 RX ADMIN — ROPINIROLE HYDROCHLORIDE 1 MG: 1 TABLET, FILM COATED ORAL at 21:40

## 2019-03-24 RX ADMIN — LORAZEPAM 2 MG: 1 TABLET ORAL at 21:40

## 2019-03-24 RX ADMIN — FOLIC ACID 1 MG: 1 TABLET ORAL at 15:14

## 2019-03-24 RX ADMIN — TRAZODONE HYDROCHLORIDE 50 MG: 50 TABLET ORAL at 21:40

## 2019-03-24 RX ADMIN — LORAZEPAM 2 MG: 1 TABLET ORAL at 15:14

## 2019-03-24 RX ADMIN — HYDROXYZINE HYDROCHLORIDE 25 MG: 25 TABLET ORAL at 22:32

## 2019-03-24 RX ADMIN — IBUPROFEN 800 MG: 800 TABLET ORAL at 09:17

## 2019-03-24 RX ADMIN — THIAMINE HCL TAB 100 MG 100 MG: 100 TAB at 15:14

## 2019-03-24 RX ADMIN — PANTOPRAZOLE SODIUM 40 MG: 40 TABLET, DELAYED RELEASE ORAL at 06:43

## 2019-03-24 RX ADMIN — IBUPROFEN 800 MG: 800 TABLET ORAL at 22:32

## 2019-03-24 RX ADMIN — VALACYCLOVIR 1000 MG: 500 TABLET, FILM COATED ORAL at 09:17

## 2019-03-24 RX ADMIN — LORAZEPAM 2 MG: 0.5 TABLET ORAL at 09:18

## 2019-03-24 RX ADMIN — LURASIDONE HYDROCHLORIDE 40 MG: 40 TABLET, FILM COATED ORAL at 18:36

## 2019-03-24 ASSESSMENT — ENCOUNTER SYMPTOMS
DIARRHEA: 0
ABDOMINAL PAIN: 0
COUGH: 0
NAUSEA: 1
SHORTNESS OF BREATH: 0
CONSTIPATION: 0
WHEEZING: 0
VOMITING: 0
BACK PAIN: 1

## 2019-03-24 ASSESSMENT — PAIN SCALES - GENERAL
PAINLEVEL_OUTOF10: 5
PAINLEVEL_OUTOF10: 2
PAINLEVEL_OUTOF10: 6

## 2019-03-24 ASSESSMENT — PAIN DESCRIPTION - LOCATION: LOCATION: GENERALIZED

## 2019-03-24 ASSESSMENT — PAIN DESCRIPTION - PAIN TYPE: TYPE: ACUTE PAIN

## 2019-03-24 NOTE — PLAN OF CARE
Problem: Altered Mood, Depressive Behavior:  Goal: Able to verbalize acceptance of life and situations over which he or she has no control  Description  Able to verbalize acceptance of life and situations over which he or she has no control  3/24/2019 0008 by Shanita Herman LPN  Outcome: Ongoing  Note:   Patient has been calm, controlled and med complaint. Accepting of 1:1 talk time with staff. Problem: Altered Mood, Depressive Behavior:  Goal: Able to verbalize and/or display a decrease in depressive symptoms  Description  Able to verbalize and/or display a decrease in depressive symptoms  3/24/2019 0008 by Shanita Herman LPN  Outcome: Ongoing  Note:   Pt admits to depression/anxiety at this time, asks for ativan prn for anxiety. Pleasant after medication. Problem: Altered Mood, Depressive Behavior:  Goal: Ability to disclose and discuss suicidal ideas will improve  Description  Ability to disclose and discuss suicidal ideas will improve  3/24/2019 0008 by Shanita Herman LPN  Outcome: Ongoing  Note:   Pt denies suicidal ideations at this time, Pt is cooperative and pleasant, medication compliant.
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
## 2019-03-24 NOTE — PLAN OF CARE
Patient voiced acceptance of his life situation stated just want to get help ;he has a strong support system at home. Patient remain safe while on  the unit denies all thoughts of self harm,  contact for safety. Patient encouraged to continue medication regime. Safety protocol maintained.

## 2019-03-24 NOTE — GROUP NOTE
Group Therapy Note    Date: March 24    Group Start Time: 1340  Group End Time: 1430  Group Topic: Recreational    [facility] TIFFANIEI C    Chanell Mendenhall    Pt did not participate in wellness education/ cognitive skills group at [phone] despite staff encouragement to attend.         Signature:  Chanell Mendenhall
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
## 2019-03-24 NOTE — BH NOTE
PSYCHOEDUCATION GROUP NOTE    Date: 3/24/19  Start Time: 11:00am  End Time: 11:30am    Number Participants in Group:  19    Goal:  Patient will demonstrate increased interpersonal interaction   Topic: Safety Group    Discipline Responsible:   OT  AT   x Nsg.  RT MHP Other       Participation Level:     None  Minimal   x Active Listener  Interactive    Monopolizing         Participation Quality:  x Appropriate  Inappropriate          Attentive        Intrusive          Sharing        Resistant          Supportive        Lethargic       Affective:    Congruent  Incongruent  Blunted  Flat    Constricted  Anxious  Elated  Angry    Labile  Depressed  Other         Cognitive:  x Alert  Oriented PPTP     Concentration x G  F  P   Attention Span  G  F  P   Short-Term Memory  G  F  P   Long-Term Memory  G  F  P   ProblemSolving/  Decision Making  G  F  P   Ability to Process  Information  G  F  P      Contributing Factors             Delusional             Hallucinating             Flight of Ideas             Other:       Modes of Intervention:  x Education  Support  Exploration    Clarifying  Problem Solving  Confrontation    Socialization  Limit Setting  Reality Testing    Activity  Movement  Media    Other:            Response to Learning:  x Able to verbalize current knowledge/experience    Able to verbalize/acknowledge new learning    Able to retain information    Capable of insight    Able to change behavior    Progressing to goal    Other:        Comments:

## 2019-03-25 PROBLEM — F31.9 BIPOLAR 1 DISORDER (HCC): Chronic | Status: ACTIVE | Noted: 2019-03-23

## 2019-03-25 PROCEDURE — 99232 SBSQ HOSP IP/OBS MODERATE 35: CPT | Performed by: PSYCHIATRY & NEUROLOGY

## 2019-03-25 PROCEDURE — 6370000000 HC RX 637 (ALT 250 FOR IP): Performed by: PSYCHIATRY & NEUROLOGY

## 2019-03-25 PROCEDURE — 6370000000 HC RX 637 (ALT 250 FOR IP): Performed by: NURSE PRACTITIONER

## 2019-03-25 PROCEDURE — 1240000000 HC EMOTIONAL WELLNESS R&B

## 2019-03-25 RX ORDER — ROPINIROLE 0.5 MG/1
0.5 TABLET, FILM COATED ORAL 3 TIMES DAILY
Status: DISCONTINUED | OUTPATIENT
Start: 2019-03-25 | End: 2019-03-29 | Stop reason: HOSPADM

## 2019-03-25 RX ADMIN — THIAMINE HCL TAB 100 MG 100 MG: 100 TAB at 09:07

## 2019-03-25 RX ADMIN — HYDROXYZINE HYDROCHLORIDE 25 MG: 25 TABLET ORAL at 22:01

## 2019-03-25 RX ADMIN — LORAZEPAM 2 MG: 1 TABLET ORAL at 18:17

## 2019-03-25 RX ADMIN — VALACYCLOVIR 1000 MG: 500 TABLET, FILM COATED ORAL at 09:07

## 2019-03-25 RX ADMIN — LORAZEPAM 2 MG: 1 TABLET ORAL at 22:15

## 2019-03-25 RX ADMIN — LORAZEPAM 2 MG: 1 TABLET ORAL at 11:50

## 2019-03-25 RX ADMIN — LORAZEPAM 2 MG: 1 TABLET ORAL at 00:46

## 2019-03-25 RX ADMIN — PANTOPRAZOLE SODIUM 40 MG: 40 TABLET, DELAYED RELEASE ORAL at 06:03

## 2019-03-25 RX ADMIN — ROPINIROLE HYDROCHLORIDE 0.5 MG: 1 TABLET, FILM COATED ORAL at 22:02

## 2019-03-25 RX ADMIN — ALUMINUM HYDROXIDE, MAGNESIUM HYDROXIDE, AND SIMETHICONE 30 ML: 200; 200; 20 SUSPENSION ORAL at 18:57

## 2019-03-25 RX ADMIN — Medication 400 MG: at 22:01

## 2019-03-25 RX ADMIN — FOLIC ACID 1 MG: 1 TABLET ORAL at 09:07

## 2019-03-25 RX ADMIN — VORTIOXETINE 10 MG: 10 TABLET, FILM COATED ORAL at 11:50

## 2019-03-25 RX ADMIN — TRAZODONE HYDROCHLORIDE 50 MG: 50 TABLET ORAL at 22:01

## 2019-03-25 RX ADMIN — LORAZEPAM 2 MG: 1 TABLET ORAL at 06:06

## 2019-03-25 RX ADMIN — Medication 400 MG: at 11:50

## 2019-03-25 RX ADMIN — LURASIDONE HYDROCHLORIDE 40 MG: 40 TABLET, FILM COATED ORAL at 18:17

## 2019-03-25 RX ADMIN — ROPINIROLE HYDROCHLORIDE 0.5 MG: 1 TABLET, FILM COATED ORAL at 15:35

## 2019-03-25 NOTE — PLAN OF CARE
Circumstantial  Thought Content:Normal: Yes  Thought Content: Preoccupations  Hallucinations: None  Delusions: No  Memory:Normal: Yes  Insight and Judgment: No  Insight and Judgment: Poor Insight, Poor Judgment  Present Suicidal Ideation: No  Present Homicidal Ideation: No    Daily Assessment Last Entry:   Daily Sleep (WDL): Within Defined Limits         Patient Currently in Pain: No  Daily Nutrition (WDL): Within Defined Limits    Patient Monitoring:  Frequency of Checks: 4 times per hour, close    Psychiatric Symptoms:   Depression Symptoms  Depression Symptoms: Feelings of hopelessess, Loss of interest, Feelings of helplessness  Anxiety Symptoms  Anxiety Symptoms: Generalized  Tiffany Symptoms  Tiffany Symptoms: No problems reported or observed. Psychosis Symptoms  Delusion Type: No problems reported or observed. Suicide Risk CSSR-S:  1) Within the past month, have you wished you were dead or wished you could go to sleep and not wake up? : Yes  2) Have you actually had any thoughts of killing yourself? : No  3) Have you been thinking about how you might kill yourself? : No  5) Have you started to work out or worked out the details of how to kill yourself? Do you intend to carry out this plan? : No  6) Have you ever done anything, started to do anything, or prepared to do anything to end your life?: No  Change in Result: Change in Plan of care:      EDUCATION:   Learner Progress Toward Treatment Goals: Reviewed results and recommendations of this team, Reviewed group plan and strategies, Reviewed signs, symptoms and risk of self harm and violent behavior, Reviewed goals and plan of care    Method:  small group, individual verbal education    Outcome: Verbalized by patient but needs reinforcement to obtain goals    PATIENT GOALS:  Short term:  Work on follow up care at [facility] term:  Maintain sobriety    PLAN/TREATMENT [address]:  continue with group therapies, increased socialization, continue planning for after discharge goals, continue with medication compliance    SHORT-TERM GOALS UPDATE:   Time frame for Short-Term Goals:  5-7 days    LONG-TERM GOALS UPDATE:   Time frame for Long-Term Goals:  6 months    Members Present in Team Meeting:   See signature sheet  Problem: Altered Mood, Depressive Behavior:  Goal: Able to verbalize and/or display a decrease in depressive symptoms  Description  Able to verbalize and/or display a decrease in depressive symptoms  Outcome: Ongoing     Emry Maker, CTRS

## 2019-03-25 NOTE — PLAN OF CARE
1:1 with pt x ten minutes. Pt encouraged to attend unit programming and interact with peers and staff. Pt also encouraged to tend to hygiene and ADLs. Pt encouraged to discuss feelings with staff and feedback and reassurance provided. Pt denies thoughts of self harm and is agreeable to seeking out should thoughts of self harm arise. Safe environment maintained. Q15 minute checks for safety cont per unit policy. Will cont to monitor for safety and provides support and reassurance as needed. Pt is controlled et cooperative with staff. States he is feeling tired today and just wants to rest. Pt is compliant with medications and shows no side effects.  No tremors noted et pt does state that the Ativan is helping with his withdrawal.

## 2019-03-25 NOTE — FLOWSHEET NOTE
Patient attended Spirituality Group.       03/25/19 1650   Encounter Summary   Services provided to: Patient   Referral/Consult From: Rounding   Continue Visiting   (3/25/19)   Complexity of Encounter Moderate   Length of Encounter 30 minutes   Spiritual Assessment Completed Yes   Routine   Type Initial   Spiritual/Gnosticism   Type Spiritual support   Assessment Calm   Intervention Active listening;Explored feelings, thoughts, concerns;Prayer;Sustaining presence/ Ministry of presence; Discussed belief system/Hinduism practices/jesus   Outcome Engaged in conversation;Expressed feelings/needs/concerns;Coping;Receptive

## 2019-03-25 NOTE — CARE COORDINATION
CD  Consult Summary      Patient was provided PHQ-9, AUDIT and DAST Screening:      AUDIT Score:  22  DAST Score:  3  PHQ-9 Score: NA    Patients substance use is considered     Low Risk/Healthy   Moderate Risk  Harmful  Dependent X    Patients depression is considered:     Minimal  Mild   Moderate X  Moderately Severe  Severe    Brief Education Was Provided    Patient was receptive    Brief Intervention Is Provided (Only for AUDIT or DAST)     Patient reports readiness to decrease and/or stop use and a plan was discussed     Recommendations/Referrals for Brief and/or Specialized Treatment Provided to Patient   CD  met with pt on this date per unit  referral made on 3/24/19. Pt's AOD use history and treatment options were discussed. Pt reports that he has been struggling with alcoholism for the majority of his life. Pt reports he began drinking at the age of 16 and has drank regularly since. Pt reports on average he drinks 12+ beers a day. Pt reports he also abuses cocaine, but alcohol is his drug of choice. Pt reports he has attempted sobriety in the past and has been unsuccessful. Pt reports he was in the [facility] program in the past and would like to engage it in upon discharge. Pt reports due to his use he has had issues in his relationships, holding a job, and some physical issues. AOD and Dual TX levels of care were explained and discussed with pt. Pt reports he is most interested in [facility]. This writer provided pt with a list of recovery housing and inpatient facilities and encouraged pt to contact them for availability. This writer will follow up with pt no later than 3/27/19 and continue to communicate with unit social workers and staff.     Karan Salas MSW, LSW, LCDC III

## 2019-03-25 NOTE — PLAN OF CARE
Psychoeducation Group Note    Date: 3/25/19  Start Time: 1330  End Time: 1415    Number Participants in Group:  6/19    Goal:  Patient will demonstrate increased interpersonal interaction   Topic: Decreasing stress levels, increase coping skills    Discipline Responsible:   OT  AT  [facility]. x RT  Other       Participation Level:     None  Minimal   x Active Listener x Interactive    Monopolizing         Participation Quality:  x Appropriate  Inappropriate   x       Attentive        Intrusive          Sharing        Resistant          Supportive        Lethargic       Affective:   x Congruent  Incongruent  Blunted  Flat    Constricted  Anxious  Elated  Angry    Labile  Depressed  Other         Cognitive:  x Alert x Oriented PPTP     Concentration x G  F  P   Attention Span x G  F  P   Short-Term Memory  G  F  P   Long-Term Memory  G  F  P   ProblemSolving/  Decision Making x G  F  P   Ability to Process  Information x G  F  P      Contributing Factors             Delusional             Hallucinating             Flight of Ideas             Other:       Modes of Intervention:  x Education x Support  Exploration    Clarifying x Problem Solving  Confrontation   x Socialization  Limit Setting  Reality Testing   x Activity  Movement  Media    Other:            Response to Learning:  x Able to verbalize current knowledge/experience   x Able to verbalize/acknowledge new learning    Able to retain information   x Capable of insight   x Able to change behavior    Progressing to goal    Other:        Comments:

## 2019-03-25 NOTE — PLAN OF CARE
Patient declined to attend psychotherapy at 1100 am despite encouragement, patient was offered 1:1 and refused

## 2019-03-25 NOTE — PLAN OF CARE
Pt did not attend impulse control and memory skills group at 1000 despite staff invitation to attend.

## 2019-03-25 NOTE — CARE COORDINATION
Discharge Planning     [employer] - patient to call Tuesday morning   meds filled in house  Taxi to inpatient if accepted  Link to [facility] for services

## 2019-03-25 NOTE — BH NOTE
Department of Psychiatry  Attending Progress Note  Chief Complaint: Bipolar 1 disorder ([address]     SUBJECTIVE:    Patient complains of depression sadness and lack of energy and motivation. He has suicidal thoughts. He said he still withdrawing from alcohol. He was unable to sleep. He said he is a whole body is aching and he feels that it is bonding also. He said he doesn't have a fever. He was able to eat. He denies any hallucinations or delusions.     OBJECTIVE    Physical  BP (!) 143/86   Pulse 86   Temp 97.9 °F (36.6 °C)   Resp 14   Ht 5' 11\" (1.803 m)   Wt 205 lb (93 kg)   SpO2 96%   BMI 28.59 kg/m²      Mental Status Evaluation:  Orientation: alertness: lethargic   Mood:. anxious and constricted      Affect:  constricted      Appearance:  age appropriate   Activity:  Psychomotor Retardation   Gait/Posture: Normal   Speech:  delayed, increased latency of response, normal pitch and normal volume   Thought Process:  circumstantial   Thought Content:  He denies delusions and hallucinations   Sensorium:  person and place   Cognition:  grossly intact   Memory: intact   Insight:  limited   Judgment: limited   Suicidal Ideations: passive and without plan   Homicidal Ideations: Negative for homicidal ideation      Medication Side Effects: absent       Attention Span attention span appeared shorter than expected for age     Medications  Current Facility-Administered Medications   Medication Dose Route Frequency Provider Last Rate Last Dose    vitamin B-1 (THIAMINE) tablet 100 mg  100 mg Oral Daily ABHI Onofre CNP   100 mg at 74/04/45 5614    folic acid (FOLVITE) tablet 1 mg  1 mg Oral Daily ABHI Onofre - CNP   1 mg at 03/24/19 1514    LORazepam (ATIVAN) tablet 2 mg  2 mg Oral Q4H PRN Mikala Delvalle APRN - CNP   2 mg at 03/25/19 0606    lurasidone (LATUDA) tablet 40 mg  40 mg Oral Dinner ABHI Onofre - CNP   40 mg at 03/24/19 1836    hydrOXYzine (ATARAX) tablet 25 mg 25 mg Oral TID PRN Bevelyn Dusky, APN   25 mg at 03/24/19 2232    traZODone (DESYREL) tablet 50 mg  50 mg Oral Nightly PRN Bevelyn Dusky, APN   50 mg at 03/24/19 2140    benztropine mesylate (COGENTIN) injection 2 mg  2 mg Intramuscular BID PRN Bevelyn Dusky, APN        magnesium hydroxide (MILK OF MAGNESIA) 400 MG/5ML suspension 30 mL  30 mL Oral Daily PRN Bevelyn Dusky, APN        aluminum & magnesium hydroxide-simethicone (MAALOX) 200-200-20 MG/5ML suspension 30 mL  30 mL Oral Q6H PRN Bevelyn Dusky, APN        nicotine (NICODERM CQ) 14 MG/24HR 1 patch  1 patch Transdermal Daily Bevelyn Dusky, APN   1 patch at 03/24/19 0919    pantoprazole (PROTONIX) tablet 40 mg  40 mg Oral QAM AC Bevelyn Dusky, APN   40 mg at 03/25/19 5592    valACYclovir (VALTREX) tablet 1,000 mg  1,000 mg Oral Daily Bevelyn Dusky, APN   1,000 mg at 03/24/19 1921    ibuprofen (ADVIL;MOTRIN) tablet 800 mg  800 mg Oral Q8H PRN Bevelyn Dusky, APN   800 mg at 03/24/19 2232    rOPINIRole (REQUIP) tablet 1 mg  1 mg Oral Nightly Bevelyn Dusky, APN   1 mg at 03/24/19 2140         thiamine  100 mg Oral Daily    folic acid  1 mg Oral Daily    lurasidone  40 mg Oral Dinner    nicotine  1 patch Transdermal Daily    pantoprazole  40 mg Oral QAM AC    valACYclovir  1,000 mg Oral Daily    rOPINIRole  1 mg Oral Nightly       ASSESSMENT  Bipolar 1 disorder (HCC)     Patient's Response to Treatment: negative    PLAN  Dragon voice recognition software used in portions of this document. Supportive therapy is provided and will continue to monitor his progress.

## 2019-03-26 PROCEDURE — 1240000000 HC EMOTIONAL WELLNESS R&B

## 2019-03-26 PROCEDURE — 90833 PSYTX W PT W E/M 30 MIN: CPT | Performed by: NURSE PRACTITIONER

## 2019-03-26 PROCEDURE — 6370000000 HC RX 637 (ALT 250 FOR IP): Performed by: INTERNAL MEDICINE

## 2019-03-26 PROCEDURE — 99232 SBSQ HOSP IP/OBS MODERATE 35: CPT | Performed by: NURSE PRACTITIONER

## 2019-03-26 PROCEDURE — 6370000000 HC RX 637 (ALT 250 FOR IP): Performed by: PSYCHIATRY & NEUROLOGY

## 2019-03-26 PROCEDURE — 6370000000 HC RX 637 (ALT 250 FOR IP): Performed by: NURSE PRACTITIONER

## 2019-03-26 PROCEDURE — 99221 1ST HOSP IP/OBS SF/LOW 40: CPT | Performed by: INTERNAL MEDICINE

## 2019-03-26 RX ADMIN — THIAMINE HCL TAB 100 MG 100 MG: 100 TAB at 07:51

## 2019-03-26 RX ADMIN — LORAZEPAM 2 MG: 1 TABLET ORAL at 06:30

## 2019-03-26 RX ADMIN — ROPINIROLE HYDROCHLORIDE 0.5 MG: 1 TABLET, FILM COATED ORAL at 21:29

## 2019-03-26 RX ADMIN — IBUPROFEN 800 MG: 800 TABLET ORAL at 09:31

## 2019-03-26 RX ADMIN — METOPROLOL TARTRATE 25 MG: 25 TABLET ORAL at 21:29

## 2019-03-26 RX ADMIN — PANTOPRAZOLE SODIUM 40 MG: 40 TABLET, DELAYED RELEASE ORAL at 06:02

## 2019-03-26 RX ADMIN — LORAZEPAM 2 MG: 1 TABLET ORAL at 20:59

## 2019-03-26 RX ADMIN — LORAZEPAM 2 MG: 1 TABLET ORAL at 02:21

## 2019-03-26 RX ADMIN — ROPINIROLE HYDROCHLORIDE 0.5 MG: 1 TABLET, FILM COATED ORAL at 14:23

## 2019-03-26 RX ADMIN — VORTIOXETINE 10 MG: 10 TABLET, FILM COATED ORAL at 07:51

## 2019-03-26 RX ADMIN — HYDROXYZINE HYDROCHLORIDE 25 MG: 25 TABLET ORAL at 21:00

## 2019-03-26 RX ADMIN — ROPINIROLE HYDROCHLORIDE 0.5 MG: 1 TABLET, FILM COATED ORAL at 07:51

## 2019-03-26 RX ADMIN — LURASIDONE HYDROCHLORIDE 40 MG: 40 TABLET, FILM COATED ORAL at 17:40

## 2019-03-26 RX ADMIN — FOLIC ACID 1 MG: 1 TABLET ORAL at 07:51

## 2019-03-26 RX ADMIN — Medication 400 MG: at 09:30

## 2019-03-26 RX ADMIN — HYDROXYZINE HYDROCHLORIDE 25 MG: 25 TABLET ORAL at 06:02

## 2019-03-26 RX ADMIN — Medication 400 MG: at 20:59

## 2019-03-26 RX ADMIN — LORAZEPAM 2 MG: 1 TABLET ORAL at 14:26

## 2019-03-26 RX ADMIN — IBUPROFEN 800 MG: 800 TABLET ORAL at 20:59

## 2019-03-26 RX ADMIN — VALACYCLOVIR 1000 MG: 500 TABLET, FILM COATED ORAL at 07:52

## 2019-03-26 ASSESSMENT — PAIN - FUNCTIONAL ASSESSMENT: PAIN_FUNCTIONAL_ASSESSMENT: 0-10

## 2019-03-26 ASSESSMENT — PAIN SCALES - GENERAL
PAINLEVEL_OUTOF10: 5
PAINLEVEL_OUTOF10: 7

## 2019-03-26 NOTE — PROGRESS NOTES
limited   Judgment: limited   Suicidal Ideations: denies suicidal ideation   Homicidal Ideations: Negative for homicidal ideation      Medication Side Effects: absent       Attention Span attention span appeared shorter than expected for age     Clinical Assessment Medical Decision    Axis I: Bipolar, Depressed    Precautions with Justification:   None    Medication Review/Mgmt: Medications reviewed without changes    Medical Issues: See Chart    Assessment of Risk for Harm to Self/Others:  None    PLAN  · Continue inpatient psychiatric treatment  · Supportive therapy with medication management. Reviewed risks and benefits as well as potential side effects with patient. · Continue home medications. · ETOH protocol. · Review medications for efficacy and side effects. · Therapeutic support and empathetic care provided greater than 20 minutes. · Engage in therapeutic activities and groups. · [facility] [place] after symptoms stabilized. · Discharge Planning.     Anticipated Discharge Date: TBD    Patient's Response to Treatment: positive    Karen Lam  3/26/2019  12:25 PM

## 2019-03-26 NOTE — PLAN OF CARE
Problem: Altered Mood, Depressive Behavior:  Goal: Able to verbalize and/or display a decrease in depressive symptoms  Description  Able to verbalize and/or display a decrease in depressive symptoms  3/26/2019 0944 by Adonay Watson  Outcome: Ongoing  Note:   Pt is accepting of 1:1 talk time with staff. Pt denies SI/HI and A/V hallucinations and verbally agrees to approach staff if thoughts of self harm arises. Pt admits to increased depression. Pt admits to having w/d symptoms of shaking and nausea. Reassurance and support provided. Q15 min checks maintained. Pt remains safe at this time.

## 2019-03-26 NOTE — PLAN OF CARE
Psychoeducation Group Note    Date: 3/26/2019  Start Time: 1340  End Time: 1420    Number Participants in Group:  8    Goal:  Patient will demonstrate increased interpersonal interaction   Topic: Leisure/ cognitive skills     Discipline Responsible:   OT  AT  [facility]. x RT  Other       Participation Level:     None  Minimal   x Active Listener x Interactive    Monopolizing         Participation Quality:  x Appropriate  Inappropriate   x       Attentive        Intrusive          Sharing        Resistant          Supportive        Lethargic       Affective:    Congruent  Incongruent x Blunted  Flat    Constricted  Anxious  Elated  Angry    Labile  Depressed  Other         Cognitive:  x Alert x Oriented PPTP     Concentration x G  F  P   Attention Span x G  F  P   Short-Term Memory x G  F  P   Long-Term Memory x G  F  P   ProblemSolving/  Decision Making x G  F  P   Ability to Process  Information x G  F  P      Contributing Factors             Delusional             Hallucinating             Flight of Ideas             Other:       Modes of Intervention:  x Education x Support x Exploration   x Clarifying x Problem Solving  Confrontation   x Socialization  Limit Setting x Reality Testing   x Activity  Movement  Media    Other:            Response to Learning:   Able to verbalize current knowledge/experience   x Able to verbalize/acknowledge new learning   x Able to retain information    Capable of insight    Able to change behavior   x Progressing to goal    Other:        Comments:

## 2019-03-26 NOTE — CONSULTS
for color change and rash. Neurological: Negative for dizziness and headaches. Psychiatric/Behavioral: Negative for confusion. ROS  Physical exam     BP (!) 166/112   Pulse 92   Temp 98.1 °F (36.7 °C) (Oral)   Resp 14   Ht 5' 11\" (1.803 m)   Wt 205 lb (93 kg)   SpO2 96%   BMI 28.59 kg/m²      General appearance: well nourished in no distress  Eyes:  АНДРЕЙ   Head: AT/NC  ENT NAD   Neck: Trachea midline; supple  Lungs: normal effort, clear to auscultation. CVS sinus with no murmurs. Vasc No JVP, no carotid bruit  Abdomen: Soft, non-tender; no masses or HSM,   Extremities: no edema; no digital cyanosis or clubbing. Musculoskeletal NO joint effusion or synovitis  Skin: No rash or ulcers  Neurologic: Cranial nerves II-XII grossly intact; no motor deficit. Psych: Appropriate affect, alert and oriented to person, place and time  NO lymphadenopathy            Relevant Labs/Imaging     CBC: No results for input(s): WBC, HGB, PLT in the last 72 hours. BMP:  No results for input(s): NA, K, CL, CO2, BUN, CREATININE, GLUCOSE in the last 72 hours. S. Calcium:No results for input(s): CALCIUM in the last 72 hours. Glycosylated hemoglobin A1C:   Recent Labs     03/24/19  0819   LABA1C 5.6     BNP:No results for input(s): BNP in the last 72 hours.          Assessment / Plan      Patient Active Problem List   Diagnosis    Bipolar 1 disorder, depressed, severe ([address]    Alcohol abuse    Major depressive disorder, recurrent episode, severe ([address]    Olecranon bursitis of left elbow    Left arm cellulitis    Homicidal ideation    Severe manic bipolar I disorder without psychotic features ([address]    Closed fracture of fifth metacarpal bone of right hand    Polyarthralgia    Gastroesophageal reflux disease    Bipolar 1 disorder ([address]    Suicidal ideation         A/p  HTN new add metoprolol 25 bid   HLD ->  pt can follow outpt PCP   Do not think need to rx now - if started rx would need outpt AST ALT f/u MD DARLENE Correia [address], [address] [place] [address].    Phone [phone]   Fax: [phone]  Answering Service: [phone]

## 2019-03-26 NOTE — PLAN OF CARE
Goal:  Patient will demonstrate increased interpersonal interaction   Topic: increase self esteem, coping skills    Discipline Responsible:   OT  AT    Ns. X RT  Other       Participation Level:     None  Minimal   X Active Listener X Interactive    Monopolizing         Participation Quality:  X Appropriate  Inappropriate   X       Attentive        Intrusive          Sharing        Resistant          Supportive        Lethargic       Affective:   X Congruent  Incongruent  Blunted  Flat    Constricted  Anxious  Elated  Angry    Labile  Depressed  Other         Cognitive:  X Alert X Oriented PPTP     Concentration X G  F  P   Attention Span X G  F  P   Short-Term Memory  G  F  P   Long-Term Memory  G  F  P   ProblemSolving/  Decision Making X G  F  P   Ability to Process  Information X G  F  P      Contributing Factors             Delusional             Hallucinating             Flight of Ideas             Other:       Modes of Intervention:  X Education X Support X Exploration    Clarifying  Problem Solving  Confrontation   X Socialization X Limit Setting  Reality Testing   X Activity  Movement  Media    Other:            Response to Learning:  X Able to verbalize current knowledge/experience    Able to verbalize/acknowledge new learning   X Able to retain information   X Capable of insight    Able to change behavior    Progressing to goal    Other:

## 2019-03-26 NOTE — GROUP NOTE
Group Therapy Note    Date: March 26    Group Start Time: 0900  Group End Time: 0920  Group Topic: Community Meeting    HIRAM Montilla    Pt did not participate in community meeting/ goals group at 0900 despite staff encouragement to attend.         Signature:  Quentin Montilla

## 2019-03-26 NOTE — BH NOTE
[facility]  Pt declined to attend [facility] this shift despite staff invitation.

## 2019-03-27 PROCEDURE — 6370000000 HC RX 637 (ALT 250 FOR IP): Performed by: INTERNAL MEDICINE

## 2019-03-27 PROCEDURE — 6370000000 HC RX 637 (ALT 250 FOR IP): Performed by: PSYCHIATRY & NEUROLOGY

## 2019-03-27 PROCEDURE — 99231 SBSQ HOSP IP/OBS SF/LOW 25: CPT | Performed by: INTERNAL MEDICINE

## 2019-03-27 PROCEDURE — 90833 PSYTX W PT W E/M 30 MIN: CPT | Performed by: NURSE PRACTITIONER

## 2019-03-27 PROCEDURE — 1240000000 HC EMOTIONAL WELLNESS R&B

## 2019-03-27 PROCEDURE — 6370000000 HC RX 637 (ALT 250 FOR IP): Performed by: NURSE PRACTITIONER

## 2019-03-27 PROCEDURE — 99232 SBSQ HOSP IP/OBS MODERATE 35: CPT | Performed by: NURSE PRACTITIONER

## 2019-03-27 RX ORDER — ROPINIROLE 0.5 MG/1
0.5 TABLET, FILM COATED ORAL 3 TIMES DAILY
Qty: 42 TABLET | Refills: 0 | Status: SHIPPED | OUTPATIENT
Start: 2019-03-27 | End: 2019-07-10 | Stop reason: DRUGHIGH

## 2019-03-27 RX ORDER — TRAZODONE HYDROCHLORIDE 50 MG/1
50 TABLET ORAL NIGHTLY PRN
Qty: 14 TABLET | Refills: 0 | Status: SHIPPED | OUTPATIENT
Start: 2019-03-27 | End: 2019-07-10 | Stop reason: ALTCHOICE

## 2019-03-27 RX ORDER — HYDROXYZINE HYDROCHLORIDE 25 MG/1
25 TABLET, FILM COATED ORAL 3 TIMES DAILY PRN
Qty: 42 TABLET | Refills: 0 | Status: SHIPPED | OUTPATIENT
Start: 2019-03-27 | End: 2019-04-06

## 2019-03-27 RX ADMIN — HYDROXYZINE HYDROCHLORIDE 25 MG: 25 TABLET ORAL at 21:35

## 2019-03-27 RX ADMIN — VORTIOXETINE 10 MG: 10 TABLET, FILM COATED ORAL at 08:47

## 2019-03-27 RX ADMIN — LORAZEPAM 2 MG: 1 TABLET ORAL at 03:25

## 2019-03-27 RX ADMIN — ROPINIROLE HYDROCHLORIDE 0.5 MG: 1 TABLET, FILM COATED ORAL at 08:47

## 2019-03-27 RX ADMIN — METOPROLOL TARTRATE 25 MG: 25 TABLET ORAL at 21:35

## 2019-03-27 RX ADMIN — LORAZEPAM 2 MG: 1 TABLET ORAL at 13:15

## 2019-03-27 RX ADMIN — FOLIC ACID 1 MG: 1 TABLET ORAL at 08:47

## 2019-03-27 RX ADMIN — METOPROLOL TARTRATE 25 MG: 25 TABLET ORAL at 08:47

## 2019-03-27 RX ADMIN — LORAZEPAM 2 MG: 1 TABLET ORAL at 17:22

## 2019-03-27 RX ADMIN — ROPINIROLE HYDROCHLORIDE 0.5 MG: 1 TABLET, FILM COATED ORAL at 13:15

## 2019-03-27 RX ADMIN — VALACYCLOVIR 1000 MG: 500 TABLET, FILM COATED ORAL at 08:47

## 2019-03-27 RX ADMIN — THIAMINE HCL TAB 100 MG 100 MG: 100 TAB at 08:48

## 2019-03-27 RX ADMIN — LORAZEPAM 2 MG: 1 TABLET ORAL at 08:48

## 2019-03-27 RX ADMIN — IBUPROFEN 800 MG: 800 TABLET ORAL at 17:22

## 2019-03-27 RX ADMIN — Medication 400 MG: at 21:35

## 2019-03-27 RX ADMIN — Medication 400 MG: at 08:48

## 2019-03-27 RX ADMIN — PANTOPRAZOLE SODIUM 40 MG: 40 TABLET, DELAYED RELEASE ORAL at 06:13

## 2019-03-27 RX ADMIN — ROPINIROLE HYDROCHLORIDE 0.5 MG: 1 TABLET, FILM COATED ORAL at 21:35

## 2019-03-27 RX ADMIN — LORAZEPAM 2 MG: 1 TABLET ORAL at 21:34

## 2019-03-27 RX ADMIN — LURASIDONE HYDROCHLORIDE 40 MG: 40 TABLET, FILM COATED ORAL at 17:22

## 2019-03-27 ASSESSMENT — PAIN DESCRIPTION - LOCATION: LOCATION: GENERALIZED

## 2019-03-27 ASSESSMENT — PAIN SCALES - GENERAL
PAINLEVEL_OUTOF10: 6
PAINLEVEL_OUTOF10: 2

## 2019-03-27 NOTE — CARE COORDINATION
CD Follow-up    This writer met with patient in regards to CD consult, pt reported that he wanted to d/c to [facility] program for AOD treatment. Pt reports that he called and plans to go there after being d/c home. Pt reports, \"today is my son's birthday and his first baseball game that I want to go to. We are also going camping this weekend and after that I will go to [employer] because I am done drinking\". This writer inquired about pt's relapse prevention plan for the time after he is discharged until he enters [address] [place] [address] at [facility]. Pt reports, \"I just don't want to drink so I'm going to stay away from it\". This writer encouraged pt to attend AA meetings and practice coping skills when he is experiencing cravings or in a high risk situation. Pt reports, \"I like to go for walks in the park with my friend who is trying to kick heroin and I have a lot of yard work I need to do that will keep me busy. I have all AA information at home and there is actually a meeting across the street from me\". This writer encouraged pt to follow through with [facility] and [facility] program in order to address [address] and AOD needs.  This writer also encouraged pt to reach out to SW or this writer for any further needs while he is in the hospital.

## 2019-03-27 NOTE — PROGRESS NOTES
Patient did not attend goal setting and community meeting from [id number] despite staff encouragement and prompts.

## 2019-03-27 NOTE — PROGRESS NOTES
Psychiatric Progress Note Nurse Practitioner  Pertinent History & Psychiatric Examination    HPI: Today Cassius is seen in the day room. He is disheveled, flat and poorly reactive. He states that they are not extreme. He is focused on discharge stating that he feels much better. He states that his son's birthday is Friday and that he is hopeful for discharge tomorrow. He is attending groups and is medication compliant. He is denying SI, HI and hallucinations admitting to depression and anxiety. Chart and medications reviewed. Therapeutic support, empathetic care and psycho education provided greater than 20 minutes. At this time there is no safe alternative other than inpatient care. Complaints of Pain: none  Functioning Relationships: strained with spouse or significant others      Mental Status Evaluation:  Orientation: alertness: alert   Mood:. stable      Affect:  flat      Appearance:  disheveled and older than stated age   Activity:  Psychomotor Retardation   Gait/Posture: Normal   Speech:  normal pitch and normal volume   Thought Process:  circumstantial   Thought Content:  Preoccupied   Sensorium:  person, place, time/date and situation   Cognition:  grossly intact   Memory: intact   Insight:  limited   Judgment: limited   Suicidal Ideations: denies suicidal ideation   Homicidal Ideations: Negative for homicidal ideation      Medication Side Effects: absent       Attention Span attention span appeared shorter than expected for age     Clinical Assessment Medical Decision    Axis I: Bipolar, Depressed    Precautions with Justification:   None    Medication Review/Mgmt: Medications reviewed without changes    Medical Issues: See Chart    Assessment of Risk for Harm to Self/Others:  None    PLAN  · Continue inpatient psychiatric treatment  · Supportive therapy with medication management. Reviewed risks and benefits as well as potential side effects with patient. · Continue home medications.   · ETOH protocol. · Review medications for efficacy and side effects. · Therapeutic support and empathetic care provided greater than 20 minutes. · Engage in therapeutic activities and groups. · [facility] after symptoms stabilized.   · Discharge Planning for 3/28/19    Anticipated Discharge Date: 3/28/19    Patient's Response to Treatment: positive    Justen Dolan  3/27/2019  2:14 PM

## 2019-03-27 NOTE — PLAN OF CARE
Recovery Group Note    Date: 3/27/19  Start Time: 1330  End Time: 1430    Number Participants in Group:  4/14    Goal:  Patient will demonstrate increased interpersonal interaction   Topic: Recovery group    Discipline Responsible:   OT  AT X SW  Nsg.  RT  Other       Participation Level:     None  Minimal   X Active Listener X Interactive    Monopolizing         Participation Quality:  X Appropriate  Inappropriate   X       Attentive        Intrusive   X       Sharing        Resistant   X       Supportive        Lethargic       Affective:   X Congruent  Incongruent  Blunted  Flat    Constricted  Anxious  Elated  Angry    Labile  Depressed  Other         Cognitive:  X Alert  Oriented PPTP     Concentration  G X F  P   Attention Span  G X F  P   Short-Term Memory  G X F  P   Long-Term Memory  G X F  P   ProblemSolving/  Decision Making  G X F  P   Ability to Process  Information  G X F  P      Contributing Factors             Delusional             Hallucinating             Flight of Ideas             Other:       Modes of Intervention:  X Education X Support X Exploration   X Clarifying X Problem Solving  Confrontation   X Socialization  Limit Setting  Reality Testing    Activity  Movement  Media    Other:            Response to Learning:  X Able to verbalize current knowledge/experience    Able to verbalize/acknowledge new learning    Able to retain information   X Capable of insight   X Able to change behavior   X Progressing to goal    Other:        Comments:   Pt is making progress AEB participating in group discussion, attentively listening, and supporting others.
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
## 2019-03-27 NOTE — PLAN OF CARE
Pt denies all at this time except some anxiety, calm controlled cooperative with treatment cooperative during 1:1 reports broken sleep and normal appetite. spontaneous safety checks to be maintained by staff

## 2019-03-27 NOTE — PLAN OF CARE
PSYCHOEDUCATION GROUP NOTE    Date: 3/27/19  Start Time: 1000  End Time: 6653    Number Participants in Group:  7    Goal:  Patient will demonstrate increased interpersonal interaction   Topic: Creative expression and self worth    Discipline Responsible:   OT  AT  [facility]. x RT MHP Other       Participation Level:     None  Minimal    Active Listener x Interactive   x Monopolizing         Participation Quality:  x Appropriate  Inappropriate   x       Attentive x       Intrusive   x       Sharing        Resistant          Supportive        Lethargic       Affective:    Congruent  Incongruent  Blunted  Flat   x Constricted  Anxious  Elated  Angry    Labile  Depressed  Other         Cognitive:  x Alert x Oriented PPTP     Concentration  G x F  P   Attention Span  G x F  P   Short-Term Memory  G x F  P   Long-Term Memory  G x F  P   ProblemSolving/  Decision Making  G x F  P   Ability to Process  Information  G x F  P      Contributing Factors             Delusional             Hallucinating             Flight of Ideas             Other:       Modes of Intervention:  x Education x Support x Exploration    Clarifying  Problem Solving  Confrontation   x Socialization x Limit Setting  Reality Testing   x Activity  Movement x Media    Other:            Response to Learning:  x Able to verbalize current knowledge/experience   x Able to verbalize/acknowledge new learning   x Able to retain information    Capable of insight   x Able to change behavior   x Progressing to goal    Other:        Comments: Pt attended group and participated.

## 2019-03-27 NOTE — GROUP NOTE
Date: 3/26                   Start Time: 830pm                 End Time: 915pm     Number Participants in Group: 7      Goal:  Patient will demonstrate increased interpersonal interaction   Topic: wrap up     Discipline Responsible:    OT   AT    x Nsg.   RT   Other         Participation Level:                   None   Minimal   x Active Listener x Interactive     Monopolizing             Participation Quality:  x Appropriate   Inappropriate   x       Attentive         Intrusive   x       Sharing         Resistant   x       Supportive         Lethargic         Affective:          x Congruent   Incongruent   Blunted   Flat     Constricted   Anxious   Elated   Angry     Labile   Depressed   Other             Cognitive:  x Alert   Oriented PPTP      Concentration x G   F   P   Attention Span x G   F   P   Short-Term Memory x G   F   P   Long-Term Memory x G   F   P   ProblemSolving/  Decision Making x G   F   P   Ability to Process  Information x G   F   P        Contributing Factors              Delusional              Hallucinating              Flight of Ideas              Other:         Modes of Intervention:  x Education   Support   Exploration   x Clarifying   Problem Solving   Confrontation     Socialization   Limit Setting   Reality Testing     Activity   Movement   Media     Other:                  Response to Learning:  x Able to verbalize current knowledge/experience   x Able to verbalize/acknowledge new learning     Able to retain information     Capable of insight     Able to change behavior     Progressing to goal     Other:          Comments:

## 2019-03-28 PROCEDURE — 99232 SBSQ HOSP IP/OBS MODERATE 35: CPT | Performed by: NURSE PRACTITIONER

## 2019-03-28 PROCEDURE — 6370000000 HC RX 637 (ALT 250 FOR IP): Performed by: NURSE PRACTITIONER

## 2019-03-28 PROCEDURE — 6360000002 HC RX W HCPCS: Performed by: NURSE PRACTITIONER

## 2019-03-28 PROCEDURE — 6370000000 HC RX 637 (ALT 250 FOR IP): Performed by: INTERNAL MEDICINE

## 2019-03-28 PROCEDURE — 6370000000 HC RX 637 (ALT 250 FOR IP): Performed by: PSYCHIATRY & NEUROLOGY

## 2019-03-28 PROCEDURE — 1240000000 HC EMOTIONAL WELLNESS R&B

## 2019-03-28 PROCEDURE — 5130000000 HC BRIDGE APPOINTMENT: Performed by: COUNSELOR

## 2019-03-28 PROCEDURE — 2580000003 HC RX 258: Performed by: NURSE PRACTITIONER

## 2019-03-28 RX ORDER — HALOPERIDOL 5 MG/ML
10 INJECTION INTRAMUSCULAR ONCE
Status: COMPLETED | OUTPATIENT
Start: 2019-03-28 | End: 2019-03-28

## 2019-03-28 RX ORDER — DIPHENHYDRAMINE HYDROCHLORIDE 50 MG/ML
50 INJECTION INTRAMUSCULAR; INTRAVENOUS ONCE
Status: COMPLETED | OUTPATIENT
Start: 2019-03-28 | End: 2019-03-28

## 2019-03-28 RX ORDER — ZIPRASIDONE MESYLATE 20 MG/ML
20 INJECTION, POWDER, LYOPHILIZED, FOR SOLUTION INTRAMUSCULAR ONCE
Status: COMPLETED | OUTPATIENT
Start: 2019-03-28 | End: 2019-03-28

## 2019-03-28 RX ADMIN — LORAZEPAM 2 MG: 1 TABLET ORAL at 04:58

## 2019-03-28 RX ADMIN — LURASIDONE HYDROCHLORIDE 40 MG: 40 TABLET, FILM COATED ORAL at 18:31

## 2019-03-28 RX ADMIN — VORTIOXETINE 10 MG: 10 TABLET, FILM COATED ORAL at 09:30

## 2019-03-28 RX ADMIN — ZIPRASIDONE MESYLATE 20 MG: 20 INJECTION, POWDER, LYOPHILIZED, FOR SOLUTION INTRAMUSCULAR at 04:58

## 2019-03-28 RX ADMIN — LORAZEPAM 2 MG: 1 TABLET ORAL at 13:58

## 2019-03-28 RX ADMIN — VALACYCLOVIR 1000 MG: 500 TABLET, FILM COATED ORAL at 09:30

## 2019-03-28 RX ADMIN — IBUPROFEN 800 MG: 800 TABLET ORAL at 18:31

## 2019-03-28 RX ADMIN — HALOPERIDOL LACTATE 10 MG: 5 INJECTION INTRAMUSCULAR at 00:46

## 2019-03-28 RX ADMIN — DIPHENHYDRAMINE HYDROCHLORIDE 50 MG: 50 INJECTION, SOLUTION INTRAMUSCULAR; INTRAVENOUS at 00:47

## 2019-03-28 RX ADMIN — ROPINIROLE HYDROCHLORIDE 0.5 MG: 1 TABLET, FILM COATED ORAL at 09:30

## 2019-03-28 RX ADMIN — METOPROLOL TARTRATE 25 MG: 25 TABLET ORAL at 14:08

## 2019-03-28 RX ADMIN — LORAZEPAM 2 MG: 1 TABLET ORAL at 09:29

## 2019-03-28 RX ADMIN — WATER 1.2 ML: 1 INJECTION, SOLUTION INTRAVENOUS at 04:57

## 2019-03-28 RX ADMIN — ROPINIROLE HYDROCHLORIDE 0.5 MG: 1 TABLET, FILM COATED ORAL at 13:58

## 2019-03-28 ASSESSMENT — PAIN SCALES - GENERAL: PAINLEVEL_OUTOF10: 6

## 2019-03-28 NOTE — PROGRESS NOTES
Psychiatric Progress Note Nurse Practitioner  Pertinent History & Psychiatric Examination    HPI: Today Alyson Vasquez is seen in the seclusion room. His behaviors became bizarre last night including trying to take his bed apart with a comb because he was \"looking for his wife,\" he was found wandering into other patient's rooms, taking his clothes off and acting extremely confused. Today is Day 5 of ETOH discontinuation. He also approached staff last night requesting a  knife. He has been given PRNs to assist with his symptoms without success. He has not required restraints. His discharge for today has been canceled. He is disheveled, flat and in an agitated state. He continues to remain focused on discharge. He continues to deny SI, HI and hallucinations admitting to depression and anxiety. Chart and medications reviewed. Therapeutic support, empathetic care and psycho education provided. At this time there is no safe alternative other than inpatient care.     Complaints of Pain: none  Functioning Relationships: strained with spouse or significant others      Mental Status Evaluation:  Orientation: alertness: hyperalert   Mood:. Delusional, bizarre      Affect:  flat      Appearance:  disheveled and older than stated age   Activity:  Psychomotor agitation   Gait/Posture: Normal   Speech:  normal pitch and normal volume   Thought Process:  circumstantial   Thought Content:  Preoccupied   Sensorium:  person, place, time/date and situation   Cognition:  Limited due to current mental state   Memory: Limited due to current mental state   Insight:  limited   Judgment: limited   Suicidal Ideations: denies suicidal ideation   Homicidal Ideations: Negative for homicidal ideation      Medication Side Effects: absent       Attention Span attention span appeared shorter than expected for age     Clinical Assessment Medical Decision    Axis I: Bipolar, Depressed    Precautions with Justification:   None    Medication Review/Mgmt: Medications reviewed without changes    Medical Issues: See Chart    Assessment of Risk for Harm to Self/Others:  None    PLAN  · Continue inpatient psychiatric treatment  · Supportive therapy with medication management. Reviewed risks and benefits as well as potential side effects with patient. · Continue home medications. · ETOH protocol. · Review medications for efficacy and side effects. · Therapeutic support and empathetic care provided. · Engage in therapeutic activities and groups. · Follow up at [facility] after symptoms stabilized. · Discharge Planning.     Anticipated Discharge Date: TBD    Patient's Response to Treatment:     Giana De La Torre  3/28/2019  9:05 AM

## 2019-03-28 NOTE — PLAN OF CARE
Problem: Altered Mood, Depressive Behavior:  Goal: Able to verbalize acceptance of life and situations over which he or she has no control  Description  Able to verbalize acceptance of life and situations over which he or she has no control  3/27/2019 2236 by Edward Momin LPN  Outcome: Ongoing     Problem: Altered Mood, Depressive Behavior:  Goal: Able to verbalize and/or display a decrease in depressive symptoms  Description  Able to verbalize and/or display a decrease in depressive symptoms  3/27/2019 2236 by Edawrd Momin LPN  Outcome: Ongoing     Problem: Altered Mood, Depressive Behavior:  Goal: Ability to disclose and discuss suicidal ideas will improve  Description  Ability to disclose and discuss suicidal ideas will improve  Outcome: Ongoing  Patient denies suicidal and homicidal ideation at this time. Patientdenies depression at this time. Patient was out in dayroom most of the evening but aloof of peers. Patient appears restless and disoriented at times. Patient gets irritable and wants to leave but is redirectable. Patient encouraged to participate in unit group programming/work with staff to identify any other life situations that are not within ability of control and to learn how to accept and deal with them. Patient is free of self harm at this time. Patient agrees to seek out staff if thoughts to harm self arise. Staff will provide support and reassurance as needed. Safety checks maintained every 15 minutes.

## 2019-03-28 NOTE — PLAN OF CARE
Patient has been seclusive to his room this evening resting in bed. Patient has had labile mood and impulsive action. Patient has been actively hallucinating and has been confused and disoriented. Patient denies any suicidal thoughts at this time. Patient agrees to come talk with staff if having any thoughts to harm himself this shift. 15 min rounds continued for patient safety.

## 2019-03-28 NOTE — PLAN OF CARE
Problem: Suicide risk  Description  Suicide risk  Goal: Provide patient with safe environment  3/28/2019 1653 by Sheila Valverde RN  Outcome: Ongoing     Problem: Altered Mood, Depressive Behavior:  Goal: Able to verbalize acceptance of life and situations over which he or she has no control  3/28/2019 1653 by Sheila Valverde RN  Outcome: Ongoing     Problem: Altered Mood, Depressive Behavior:  Goal: Able to verbalize and/or display a decrease in depressive symptoms  3/28/2019 1653 by Sheila Valverde RN  Outcome: Ongoing     Problem: Altered Mood, Depressive Behavior:  Goal: Ability to disclose and discuss suicidal ideas will improve  3/28/2019 1653 by Sheila Valverde RN  Outcome: Ongoing     Problem: Restraint Use - Violent/Self-Destructive Behavior:  Goal: Absence of restraint indications  3/28/2019 1653 by Sheila Valverde RN  Outcome: Ongoing     Patient is medication compliant. Patient has been in seclusion this shift. Patient denies suicidal and homicidal ideation. Patient denies auditory and visual hallucinations, but can be seen engaged in self talk and grasping at items that are not there. Patient has required reality reorientation frequently this shift due to confusion and delusional statements. Patient has not engaged in ADL's. Patient has not consumed meals and snacks this shift despite encouragement from staff. Patient has had poor sleep. Patient has remained free from harm. Patient has had constant observation as well as every 15 minute and spontaneous safety checks have been maintained.

## 2019-03-28 NOTE — GROUP NOTE
Date: 3/27/19  Start Time: 2030 End Time: 2100    Number Participants in Group:  7/14    Goal:  Patient will demonstrate increased interpersonal interaction   Topic: Wrap up and Relaxation    Discipline Responsible:   OT  AT   x Nsg.  RT  Other       Participation Level:     None  Minimal   x Active Listener x Interactive    Monopolizing         Participation Quality:  x Appropriate  Inappropriate   x       Attentive        Intrusive   x       Sharing        Resistant   x       Supportive        Lethargic       Affective:   x Congruent  Incongruent  Blunted  Flat    Constricted  Anxious  Elated  Angry    Labile  Depressed  Other         Cognitive:  x Alert  Oriented PPTP     Concentration x G  F  P   Attention Span x G  F  P   Short-Term Memory x G  F  P   Long-Term Memory x G  F  P   ProblemSolving/  Decision Making x G  F  P   Ability to Process  Information x G  F  P      Contributing Factors             Delusional             Hallucinating             Flight of Ideas             Other:       Modes of Intervention:  x Education  Support  Exploration   x Clarifying  Problem Solving  Confrontation    Socialization  Limit Setting  Reality Testing    Activity  Movement  Media    Other:            Response to Learning:  x Able to verbalize current knowledge/experience   x Able to verbalize/acknowledge new learning    Able to retain information    Capable of insight    Able to change behavior    Progressing to goal    Other:        Comments:

## 2019-03-28 NOTE — PLAN OF CARE
Pt did not attend community meeting and goal setting group at 0900 despite staff invitation to attend.

## 2019-03-28 NOTE — BH NOTE
Patient continues to pace and scream.  Patient banging  on walls and window. Staff continues to redirect without success. Patient offered food and water and patient refused. Patient continues to think we are holding his wife in the box bed.

## 2019-03-28 NOTE — PROGRESS NOTES
BEHAVIORAL SERVICES: One - Hour In- Person Review  For Management of Violent or Self - Destructive Behavior    Seclusion/Restraint:  seclusion    Reason for Intervention: agitation, confusion, delusional, banging on walls, believes his wife is under the bed in his room    Response to Intervention:  continues bizarre behavior in seclusion room, constantly moving    Medical Record reviewed and discussed precipitating events/behaviors with RN initiating Intervention: yes  Patient Medical Status:  Vital Signs: refused  Respiratory Status: WNL  Circulatory Status: WNL  Skin Integrity:no issues    Orientation: oriented to self only  Mood/Affect: irritable, anxious   Speech: mumbling, difficult to understand  Thought Content: preoccupied    Thought Processes: blocking, loose association    Rationale for continued use of intervention: continued agitation    Rationale for discontinuing intervention: Patient is able to: sit and rest, behavioral control    One Hour Review Evaluation Physician Notification:  Almita Hardin

## 2019-03-28 NOTE — BH NOTE
BEHAVIORAL SERVICES: One - Hour In- Person Review  For Management of Violent or Self - Destructive Behavior    Seclusion/Restraint:  Seclusion    Reason for Intervention: Unable to relate criteria for release, fondling self, constant movement    Response to Intervention:  Continues aggressive behaviors in seclusion room, constantly moving, irritable, unable to relax. Unable to relate criteria for release. Medical Record reviewed and discussed precipitating events/behaviors with RN initiating Intervention:  Yes    Patient Medical Status:  Vital Signs: Refused   Respiratory Status: Even and nonlabored  Circulatory Status: WNL  Skin Integrity: intact     Orientation: not oriented to time/date and situation    Mood/Affect: anxious, irritable and labile    Speech: Mumbled    Thought Content: delusions and paranoid ideation    Thought Processes: Tangential, Blocking and Loose Associations    Rationale for continued use of intervention: Unaccepting of redirection for safety, constant movement    Rationale for discontinuing intervention: Patient is able to:  Follow direction for safety, behavioral control    One Hour Review Evaluation Physician Notification:  Yes, Genevieve Rudolph NP

## 2019-03-28 NOTE — BH NOTE
1:1    1:1 maintained to ensure patient safety. Patient resting in quiet room, no complaints voiced, no signs of symptoms of distress.

## 2019-03-28 NOTE — BH NOTE
Patient came out of room and asked for a  knife or any knife. Writer informed patient that we did not have knives. Staff went to patient room to check on patient and found him leaning over his bed with the mattress on the floor. Patient was visibly out of breath and sweating from  trying to move the bed. Patient states that his wife is trapped inside the box bed and he is trying to get her out. Staff tried to reorient him but he would not listen. Patient becoming increasingly upset that staff did not believe him. Writer called doctor for a PRN and was given a verbal order for Haldol 10 mg and Benadryl 50 mg intramuscular. Patient was accepting of medication as prescribed. Patient lying in quite room with door open. Will continue to monitor.

## 2019-03-28 NOTE — BH NOTE
SECLUSION NOTE    Order obtained for seclusion for banging on walls and yelling out, continued to escalate after prn medication ordered for agitation. Entering other patient rooms and delusional behavior. Continuous observation with use of monitor initiated. Criteria for release explained to patient, not met at this time. Will continue to reassess.

## 2019-03-28 NOTE — BH NOTE
1:1    1:1 initiated as ordered to ensure patient safety. Patient resting in quiet room, no complaints voiced, no signs of symptoms of distress.

## 2019-03-28 NOTE — PROGRESS NOTES
CLINICAL PHARMACY NOTE: MEDS TO [address] Select Patient?: No  Total # of Prescriptions Filled: 6   The following medications were delivered to the patient:  · latuda  · Metoprolol  · Ropinirole  · Trazodone  · trintellix  · Hydroxyzine  ·   Total # of Interventions Completed: 0  Time Spent (min): 30    Additional Documentation:

## 2019-03-28 NOTE — BH NOTE
Patient was offered lunch, patient declined. Patient remains restless, confused, and delusional.    Continuous monitoring remains for patient safety while in seclusion.

## 2019-03-28 NOTE — BH NOTE
Patient continues to yell and pace in the seclusion room with door open. Patient banging on the window and walls. Patient tries to open the window so that he can leave. Staff tried to redirect patient to lay down and get some rest but patient refuses. Will continue to monitor.

## 2019-03-28 NOTE — BH NOTE
Discontinuation of Seclusion    Seclusion was discontinued due to an absence of gross motor agitation, ability to remain clothed and patient was resting quietly in the seclusion room.

## 2019-03-28 NOTE — BH NOTE
1:1 Note    1:1 maintained for patient safety per physician order. Patient was out in the day area eating. Patient is in behavioral control.

## 2019-03-29 VITALS
RESPIRATION RATE: 14 BRPM | OXYGEN SATURATION: 97 % | DIASTOLIC BLOOD PRESSURE: 99 MMHG | HEIGHT: 71 IN | BODY MASS INDEX: 28.7 KG/M2 | WEIGHT: 205 LBS | SYSTOLIC BLOOD PRESSURE: 136 MMHG | TEMPERATURE: 98 F | HEART RATE: 80 BPM

## 2019-03-29 PROCEDURE — 6370000000 HC RX 637 (ALT 250 FOR IP): Performed by: NURSE PRACTITIONER

## 2019-03-29 PROCEDURE — 99238 HOSP IP/OBS DSCHRG MGMT 30/<: CPT | Performed by: NURSE PRACTITIONER

## 2019-03-29 PROCEDURE — 6370000000 HC RX 637 (ALT 250 FOR IP): Performed by: PSYCHIATRY & NEUROLOGY

## 2019-03-29 PROCEDURE — 6370000000 HC RX 637 (ALT 250 FOR IP): Performed by: INTERNAL MEDICINE

## 2019-03-29 RX ADMIN — PANTOPRAZOLE SODIUM 40 MG: 40 TABLET, DELAYED RELEASE ORAL at 08:27

## 2019-03-29 RX ADMIN — THIAMINE HCL TAB 100 MG 100 MG: 100 TAB at 08:27

## 2019-03-29 RX ADMIN — VALACYCLOVIR 1000 MG: 500 TABLET, FILM COATED ORAL at 08:28

## 2019-03-29 RX ADMIN — VORTIOXETINE 10 MG: 10 TABLET, FILM COATED ORAL at 08:27

## 2019-03-29 RX ADMIN — LORAZEPAM 2 MG: 1 TABLET ORAL at 08:32

## 2019-03-29 RX ADMIN — FOLIC ACID 1 MG: 1 TABLET ORAL at 08:27

## 2019-03-29 RX ADMIN — ROPINIROLE HYDROCHLORIDE 0.5 MG: 1 TABLET, FILM COATED ORAL at 08:28

## 2019-03-29 RX ADMIN — METOPROLOL TARTRATE 25 MG: 25 TABLET ORAL at 08:28

## 2019-03-29 RX ADMIN — LORAZEPAM 2 MG: 1 TABLET ORAL at 12:39

## 2019-03-29 NOTE — BH NOTE
[facility]  Discharge Note    Pt discharged with followings belongings:   Dentures: None  Vision - Corrective Lenses: None  Hearing Aid: None  Jewelry: None  Body Piercings Removed: N/A  Clothing: Pants, Shirt, Sweater, Socks  Were All Patient Medications Collected?: Not Applicable  Other Valuables: None   Valuables sent home with patient. Valuables retrieved from safe, Security envelope number:  [id number] and returned to patient. Patient left department with Departure Mode: With parents via Mobility at Departure: Ambulatory, discharged to Discharged to: Private Residence. Patient education on aftercare instructions: yes  Information faxed to [facility] by [facility] Patient verbalize understanding of AVS:  yes.     Status EXAM upon discharge:  Status and Exam  Normal: No  Facial Expression: Flat  Affect: Blunt  Level of Consciousness: Alert  Mood:Normal: No  Mood: Depressed, Anxious  Motor Activity:Normal: Yes  Motor Activity: Decreased  Interview Behavior: Cooperative  Preception: [place] to Person, Genita Brunt to Time, [place] to Place, [place] to Situation  Attention:Normal: No  Attention: Distractible  Thought Processes: Circumstantial  Thought Content:Normal: Yes  Thought Content: Preoccupations, Delusions  Hallucinations: Visual (Comment), Auditory (Comment)  Delusions: No  Memory:Normal: No  Memory: Poor Recent  Insight and Judgment: No  Insight and Judgment: Poor Judgment  Present Suicidal Ideation: No  Present Homicidal Ideation: No    Heather Pacheco RN
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
## 2019-03-29 NOTE — BH NOTE
One to one  Pt continues to do well, good appetite at breakfast, shaves, washes clothes, and attends morning group. One to one monitoring maintained.

## 2019-03-29 NOTE — DISCHARGE SUMMARY
DISCHARGE SUMMARY NURSE PRACTITIONER  Patient ID:  Juancho Gallego  [id number]  56 y.o.  1975    Admit date: 3/23/2019    Discharge date and time: 3/29/2019  12:15 PM     Admitting Physician: Ventura Ivy MD     Discharge Physician:  ABHI Sorenson - CNP    Admission Diagnoses: Bipolar 1 disorder ([facility] [F31.9]  Bipolar 1 disorder ([facility] [F31.9]    Discharge Diagnoses:   Bipolar 1 disorder [facility])     Patient Active Problem List   Diagnosis Code    Bipolar 1 disorder, depressed, severe ([facility] F31.4    Alcohol abuse F10.10    Major depressive disorder, recurrent episode, severe ([address] F33.2    Olecranon bursitis of left elbow M70.22    Left arm cellulitis L03.114    Homicidal ideation R45.850    Severe manic bipolar I disorder without psychotic features ([facility] F31.13    Closed fracture of fifth metacarpal bone of right hand S62.306A    Polyarthralgia M25.50    Gastroesophageal reflux disease K21.9    Bipolar 1 disorder ([address] F31.9    Benign essential HTN I10        Admission Condition: poor    Discharged Condition: stable. Hx: Juancho Gallego is a 38 y.o. male who was admitted from [address] [place] [address] Emergency Room as a voluntary patient. Patient is a daily drinker and states that when he drinks he begins to have suicidal thoughts. He reports he is diagnosed with bipolar disorder. He reports he has many depressive episodes. He states he drinks both because he enjoys drinking and as a coping skill. He reports he had a plan to either hang himself or get the  to shoot him. He also admits to cutting himself superficially within the past week. Toy Moritz was transitioned to a 1:1 yesterday evening after experiencing severe ETOH withdrawal symptoms. Patient is brightened verbalizing readiness for discharge and does not show any signs of withdrawal. He denies all including SI, HI, hallucinations, anxiety and depression. He has been medication compliant attending and participating in groups.  Patient reports improved sleep and good appetite. Provider discussed importance of follow-up and medication compliance. Chart and medications reviewed. Therapeutic support provided. Indication for Admission: threat to self    History of Present Illnes (present tense wording indicates findings from admission exam on 3/23/2019 and are not necessarily indicative of current findings): Hospital Course:   Upon admission, Spike Ambrosio was provided a safe secure environment, introduced to unit milieu. Patient participated in groups and individual therapies. Meds were adjusted. After few days of hospital care, patient began to feel improvement. Depression lifted, thoughts to harm self ceased. Sleep improved, appetite was good. On morning rounds 3/29/2019, patient endorsed feeling ready for discharge. Patient denies suicidal or homicidal ideations, denies hallucinations or delusions. Denies SE's from meds. It was decided that pt had achieved maximum benefit from hospital care and can be discharged     Consults: None    Significant Diagnostic Studies: Routine labs and diagnostics    Treatments: Psychotropic medications, therapy with group, milieu, and 1:1 with nurses, social workers, PA-C/CNP, and Attending physician.       Discharge Medications:  Current Discharge Medication List      START taking these medications    Details   metoprolol tartrate (LOPRESSOR) 25 MG tablet Take 1 tablet by mouth 2 times daily  Qty: 30 tablet, Refills: 0      lurasidone (LATUDA) 40 MG TABS tablet Take 1 tablet by mouth Daily with supper  Qty: 14 tablet, Refills: 0      VORTIoxetine (TRINTELLIX) 10 MG TABS tablet Take 10 mg by mouth daily  Qty: 14 tablet, Refills: 0      traZODone (DESYREL) 50 MG tablet Take 1 tablet by mouth nightly as needed for Sleep  Qty: 14 tablet, Refills: 0      hydrOXYzine (ATARAX) 25 MG tablet Take 1 tablet by mouth 3 times daily as needed for Anxiety  Qty: 42 tablet, Refills: 0         CONTINUE these medications which have CHANGED    Details   rOPINIRole (REQUIP) 0.5 MG tablet Take 1 tablet by mouth 3 times daily  Qty: 42 tablet, Refills: 0         CONTINUE these medications which have NOT CHANGED    Details   omeprazole (PRILOSEC) 20 MG delayed release capsule take 1 capsule by mouth once daily  Qty: 30 capsule, Refills: 1    Associated Diagnoses: Gastroesophageal reflux disease, esophagitis presence not specified         STOP taking these medications       ibuprofen (ADVIL;MOTRIN) 800 MG tablet Comments:   Reason for Stopping:         valACYclovir (VALTREX) 1 g tablet Comments:   Reason for Stopping:         FLUARIX QUADRIVALENT 0.5 ML injection Comments:   Reason for Stopping:           Discharge Exam:  Level of consciousness:  Within normal limits  Appearance: Street clothes, seated, with fair grooming  Behavior/Motor: No abnormalities noted  Attitude toward examiner:  Cooperative, attentive, good eye contact  Speech:  spontaneous, normal rate, normal volume and well articulated  Mood:  euthymic  Affect:  mood congruent  Thought processes:  linear, goal directed and coherent  Thought content:  Homocidal ideation denies  Suicidal Ideation:  denies suicidal ideation  Delusions:  no evidence of delusions  Perceptual Disturbance:  denies any perceptual disturbance  Cognition:  In tact  Memory: age appropriate  Insight & Judgement: fair  Medication side effects:  denies     Disposition: home    Patient Instructions: Activity: activity as tolerated    Follow-up as scheduled with [facility]    Time Spent:20 minutes    Engagement: Patient displayed a good level of engagement with the treatments offered during this admission.      Discharge planning, findings, and recommendations were discussed with patient and treatment team.     Signed:  Tara Hi   3/29/2019  12:15 PM

## 2019-03-29 NOTE — BH NOTE
Patient is resting in bed. Some restlessness and kicking while resting. 1:1 maintained for safety per Dr order.

## 2019-03-29 NOTE — BH NOTE
One to one  Pt continues to be controlled and cooperative. Folds laundry. One to one monitoring maintained.

## 2019-03-29 NOTE — CARE COORDINATION
PSYCHOTHERAPY GROUP NOTE    03/29/2019               Start Time:    1000                 End Time: 6182      Number Participants in Group: 7/14      Goals: relationships and recovery         RT x SW  Nsg  LPN   BHTII   LPC       Participation Level:     None  Minimal   x Active Listener x Interactive    Monopolizing         Participation Quality:  x Appropriate  Inappropriate   x  Attentive   Intrusive   x  Sharing   Resistant   x  Supportive    Lethargic       Affective:   x Congruent  Incongruent  Blunted  Flat    Constricted  Anxious  Elated  Angry    Labile  Depressed  Other         Cognitive:  x Alert x Oriented PPTS     Concentration G x F  P    Attention Span G x F  P    Short-Term Memory G x F  P    Long-Term Memory G x F  P    Problem Solving/  Decision Making G x F  P    Ability to Process  Information G x F  P       Contributing Factors             Delusional             Hallucinating             Flight of Ideas             Other: poor concentration       Modes of Intervention:   Education x Support x Exploration   x Clarifying x Problem Solving  Confrontation    Socialization  Limit Setting  Reality Testing    Activity  Movement  Media    Other:          Response to Learning:  x Able to verbalize current knowledge/experience   x Able to verbalize/acknowledge new learning   x Able to retain information   x Capable of insight   x Able to change behavior   x Progressing to goal    Other: intrusive and monopolizing       Comments:  participated in group was active

## 2019-03-29 NOTE — BH NOTE
Patient given tobacco quitline number 17058207000 at this time, refusing to call at this time, states \" I just dont want to quit now\"- patient given information as to the dangers of long term tobacco use. Continue to reinforce the importance of tobacco cessation.

## 2019-03-29 NOTE — PROGRESS NOTES
Patient did not attend goal setting and community meeting from [phone] despite encouragement and prompts.

## 2019-03-29 NOTE — CARE COORDINATION
Name: Jaxon Bruner    : 1975    Discharge Date: 3/29/19    Primary Auth/Cert #: [id number]    Discharged to home     Discharge Medications:      Medication List      START taking these medications    hydrOXYzine 25 MG tablet  Commonly known as:  ATARAX  Take 1 tablet by mouth 3 times daily as needed for Anxiety  Notes to patient:  Acute anxiety. lurasidone 40 MG Tabs tablet  Commonly known as:  LATUDA  Take 1 tablet by mouth Daily with supper  Notes to patient: To help clear thoughts. metoprolol tartrate 25 MG tablet  Commonly known as:  LOPRESSOR  Take 1 tablet by mouth 2 times daily  Notes to patient:  Hypertension. traZODone 50 MG tablet  Commonly known as:  DESYREL  Take 1 tablet by mouth nightly as needed for Sleep  Notes to patient:  As needed sleep aid. VORTIoxetine 10 MG Tabs tablet  Commonly known as:  TRINTELLIX  Take 10 mg by mouth daily  Notes to patient:  Antidepressant. CHANGE how you take these medications    rOPINIRole 0.5 MG tablet  Commonly known as:  REQUIP  Take 1 tablet by mouth 3 times daily  What changed:    · medication strength  · See the new instructions. Notes to patient:  Restless leg. CONTINUE taking these medications    omeprazole 20 MG delayed release capsule  Commonly known as:  PRILOSEC  take 1 capsule by mouth once daily  Notes to patient:  Acid control.            Where to Get Your Medications      These medications were sent to [place] [address]    Phone:  [phone]   · hydrOXYzine 25 MG tablet  · lurasidone 40 MG Tabs tablet  · metoprolol tartrate 25 MG tablet  · rOPINIRole 0.5 MG tablet  · traZODone 50 MG tablet  · VORTIoxetine 10 MG Tabs tablet         Follow Up Appointment: Kelley Krueger MD  [facility] [phone]      As needed    [facility]  [address] [facility] [address] [facility]  [phone]  [facility] [phone]  On 4/1/2019  @ 9 -12 PM for intake assessment bring ID and Insurance card call [employer] cab service @ [phone] 48 business hours ahead to schedule taxi to/from appointment    [address] [facility] [address]  [phone]      [facility] Walk-in Assessment Mon-Fri 8am- 12pm, Sat-Sun 6am-10am    Pattie Villeda MD  [address] [place], [place] [address]  [phone]    On 4/1/2019  from 9a-12p for intake assessment

## 2019-03-29 NOTE — BH NOTE
1:1 note  Pt has been awake in his room since briefly after 0300. His legs have been restless.  1:1 maintained for safety

## 2019-03-29 NOTE — BH NOTE
One to one  Pt awakens, thoughts are clear, questioning what happened yesterday, doesn't have memory of anything at this time. Controlled and cooperative. One to one monitoring maintained.

## 2019-03-29 NOTE — BH NOTE
1:1 note  Pt has been more restless. Resting at intervals with eyes closed.  1:1 maintained for safety

## 2019-03-29 NOTE — BH NOTE
Pt 1:1 note  Pt has been more restless. Resting at intervals with eyes closed.  1:1 maintained for safety

## 2019-04-10 DIAGNOSIS — G25.81 RESTLESS LEG SYNDROME: ICD-10-CM

## 2019-04-11 RX ORDER — ROPINIROLE 1 MG/1
TABLET, FILM COATED ORAL
Qty: 30 TABLET | Refills: 1 | Status: SHIPPED | OUTPATIENT
Start: 2019-04-11 | End: 2019-07-07 | Stop reason: SDUPTHER

## 2019-04-11 NOTE — TELEPHONE ENCOUNTER
Please address the medication refill and close the encounter. If I can be of assistance, please route to the applicable pool. Thank you. Last visit: [id number]  Last Med refill: [id number]  Does patient have enough medication for 72 hours:  Next Visit Date:  No future appointments.     Health Maintenance   Topic Date Due    Pneumococcal 0-64 years Vaccine (1 of 1 - PPSV23) 02/16/1982    Flu vaccine (Season Ended) 09/01/2019    Potassium monitoring  03/23/2020    Creatinine monitoring  03/23/2020    Lipid screen  03/24/2024    DTaP/Tdap/Td vaccine (2 - Td) 05/02/2026    HIV screen  Completed       Hemoglobin A1C (%)   Date Value   03/24/2019 5.6   01/23/2012 5.2             ( goal A1C is < 7)   No results found for: LABMICR  LDL Cholesterol (mg/dL)   Date Value   03/24/2019 131 (H)   04/10/2017 115       (goal LDL is <100)   AST (U/L)   Date Value   03/23/2019 65 (H)     ALT (U/L)   Date Value   03/23/2019 30     BUN (mg/dL)   Date Value   03/23/2019 9     BP Readings from Last 3 Encounters:   02/10/19 (!) 164/67   08/28/18 117/77   08/17/18 135/82          (goal 120/80)    All Future Testing planned in [facility]              Patient Active Problem List:     Bipolar 1 disorder, depressed, severe (HCC)     Alcohol abuse     Major depressive disorder, recurrent episode, severe ([address]     Olecranon bursitis of left elbow     Left arm cellulitis     Homicidal ideation     Severe manic bipolar I disorder without psychotic features ([address]     Closed fracture of fifth metacarpal bone of right hand     Polyarthralgia     Gastroesophageal reflux disease     Bipolar 1 disorder ([address]     Benign essential HTN

## 2019-04-15 DIAGNOSIS — K21.9 GASTROESOPHAGEAL REFLUX DISEASE, ESOPHAGITIS PRESENCE NOT SPECIFIED: ICD-10-CM

## 2019-04-16 RX ORDER — IBUPROFEN 800 MG/1
TABLET ORAL
Qty: 120 TABLET | Refills: 0 | Status: SHIPPED | OUTPATIENT
Start: 2019-04-16 | End: 2019-06-05 | Stop reason: SDUPTHER

## 2019-04-16 RX ORDER — OMEPRAZOLE 20 MG/1
CAPSULE, DELAYED RELEASE ORAL
Qty: 30 CAPSULE | Refills: 1 | Status: SHIPPED | OUTPATIENT
Start: 2019-04-16 | End: 2019-06-05 | Stop reason: SDUPTHER

## 2019-04-16 NOTE — TELEPHONE ENCOUNTER
Please address the medication refill and close the encounter. If I can be of assistance, please route to the applicable pool. Thank you. Last visit: [id number]  Last Med refill: [id number]  Does patient have enough medication for 72 hours:  Next Visit Date:  No future appointments.     Health Maintenance   Topic Date Due    Pneumococcal 0-64 years Vaccine (1 of 1 - PPSV23) 02/16/1982    Flu vaccine (Season Ended) 09/01/2019    Potassium monitoring  03/23/2020    Creatinine monitoring  03/23/2020    Lipid screen  03/24/2024    DTaP/Tdap/Td vaccine (2 - Td) 05/02/2026    HIV screen  Completed       Hemoglobin A1C (%)   Date Value   03/24/2019 5.6   01/23/2012 5.2             ( goal A1C is < 7)   No results found for: LABMICR  LDL Cholesterol (mg/dL)   Date Value   03/24/2019 131 (H)   04/10/2017 115       (goal LDL is <100)   AST (U/L)   Date Value   03/23/2019 65 (H)     ALT (U/L)   Date Value   03/23/2019 30     BUN (mg/dL)   Date Value   03/23/2019 9     BP Readings from Last 3 Encounters:   02/10/19 (!) 164/67   08/28/18 117/77   08/17/18 135/82          (goal 120/80)    All Future Testing planned in [facility]              Patient Active Problem List:     Bipolar 1 disorder, depressed, severe (HCC)     Alcohol abuse     Major depressive disorder, recurrent episode, severe ([address]     Olecranon bursitis of left elbow     Left arm cellulitis     Homicidal ideation     Severe manic bipolar I disorder without psychotic features ([address]     Closed fracture of fifth metacarpal bone of right hand     Polyarthralgia     Gastroesophageal reflux disease     Bipolar 1 disorder ([address]     Benign essential HTN

## 2019-06-05 DIAGNOSIS — K21.9 GASTROESOPHAGEAL REFLUX DISEASE, ESOPHAGITIS PRESENCE NOT SPECIFIED: ICD-10-CM

## 2019-06-05 NOTE — TELEPHONE ENCOUNTER
Please address the medication refill and close the encounter. If I can be of assistance, please route to the applicable pool. Thank you. Last visit: [id number]  Last Med refill: [id number]  Does patient have enough medication for 72 hours:   Next Visit Date:  No future appointments.     Health Maintenance   Topic Date Due    Pneumococcal 0-64 years Vaccine (1 of 1 - PPSV23) 02/16/1982    Flu vaccine (Season Ended) 09/01/2019    Potassium monitoring  03/23/2020    Creatinine monitoring  03/23/2020    Lipid screen  03/24/2024    DTaP/Tdap/Td vaccine (2 - Td) 05/02/2026    HIV screen  Completed       Hemoglobin A1C (%)   Date Value   03/24/2019 5.6   01/23/2012 5.2             ( goal A1C is < 7)   No results found for: LABMICR  LDL Cholesterol (mg/dL)   Date Value   03/24/2019 131 (H)   04/10/2017 115       (goal LDL is <100)   AST (U/L)   Date Value   03/23/2019 65 (H)     ALT (U/L)   Date Value   03/23/2019 30     BUN (mg/dL)   Date Value   03/23/2019 9     BP Readings from Last 3 Encounters:   02/10/19 (!) 164/67   08/28/18 117/77   08/17/18 135/82          (goal 120/80)    All Future Testing planned in [facility]              Patient Active Problem List:     Bipolar 1 disorder, depressed, severe (HCC)     Alcohol abuse     Major depressive disorder, recurrent episode, severe ([address]     Olecranon bursitis of left elbow     Left arm cellulitis     Homicidal ideation     Severe manic bipolar I disorder without psychotic features ([address]     Closed fracture of fifth metacarpal bone of right hand     Polyarthralgia     Gastroesophageal reflux disease     Bipolar 1 disorder ([address]     Benign essential HTN

## 2019-06-07 RX ORDER — OMEPRAZOLE 20 MG/1
CAPSULE, DELAYED RELEASE ORAL
Qty: 30 CAPSULE | Refills: 1 | Status: SHIPPED | OUTPATIENT
Start: 2019-06-07 | End: 2019-08-01 | Stop reason: SDUPTHER

## 2019-06-07 RX ORDER — IBUPROFEN 800 MG/1
TABLET ORAL
Qty: 120 TABLET | Refills: 0 | Status: SHIPPED | OUTPATIENT
Start: 2019-06-07 | End: 2019-07-10 | Stop reason: ALTCHOICE

## 2019-07-07 DIAGNOSIS — G25.81 RESTLESS LEG SYNDROME: ICD-10-CM

## 2019-07-08 RX ORDER — ROPINIROLE 1 MG/1
TABLET, FILM COATED ORAL
Qty: 30 TABLET | Refills: 1 | Status: SHIPPED | OUTPATIENT
Start: 2019-07-08 | End: 2019-09-01 | Stop reason: SDUPTHER

## 2019-07-10 ENCOUNTER — OFFICE VISIT (OUTPATIENT)
Dept: FAMILY MEDICINE CLINIC | Age: 44
End: 2019-07-10
Payer: MEDICARE

## 2019-07-10 VITALS
DIASTOLIC BLOOD PRESSURE: 88 MMHG | HEART RATE: 89 BPM | WEIGHT: 188 LBS | SYSTOLIC BLOOD PRESSURE: 124 MMHG | BODY MASS INDEX: 26.22 KG/M2 | TEMPERATURE: 98 F

## 2019-07-10 DIAGNOSIS — I10 ESSENTIAL HYPERTENSION: ICD-10-CM

## 2019-07-10 DIAGNOSIS — M19.90 OSTEOARTHRITIS, UNSPECIFIED OSTEOARTHRITIS TYPE, UNSPECIFIED SITE: Primary | ICD-10-CM

## 2019-07-10 PROCEDURE — G8417 CALC BMI ABV UP PARAM F/U: HCPCS | Performed by: STUDENT IN AN ORGANIZED HEALTH CARE EDUCATION/TRAINING PROGRAM

## 2019-07-10 PROCEDURE — 4004F PT TOBACCO SCREEN RCVD TLK: CPT | Performed by: STUDENT IN AN ORGANIZED HEALTH CARE EDUCATION/TRAINING PROGRAM

## 2019-07-10 PROCEDURE — G8428 CUR MEDS NOT DOCUMENT: HCPCS | Performed by: STUDENT IN AN ORGANIZED HEALTH CARE EDUCATION/TRAINING PROGRAM

## 2019-07-10 PROCEDURE — 99213 OFFICE O/P EST LOW 20 MIN: CPT | Performed by: STUDENT IN AN ORGANIZED HEALTH CARE EDUCATION/TRAINING PROGRAM

## 2019-07-10 PROCEDURE — 99211 OFF/OP EST MAY X REQ PHY/QHP: CPT | Performed by: STUDENT IN AN ORGANIZED HEALTH CARE EDUCATION/TRAINING PROGRAM

## 2019-07-10 RX ORDER — NAPROXEN 375 MG/1
375 TABLET ORAL 2 TIMES DAILY WITH MEALS
Qty: 180 TABLET | Refills: 1 | Status: SHIPPED | OUTPATIENT
Start: 2019-07-10 | End: 2019-08-08 | Stop reason: SDUPTHER

## 2019-07-10 RX ORDER — CYCLOBENZAPRINE HCL 10 MG
10 TABLET ORAL 3 TIMES DAILY
Refills: 0 | COMMUNITY
Start: 2019-07-09 | End: 2020-10-27 | Stop reason: SDUPTHER

## 2019-07-10 ASSESSMENT — ENCOUNTER SYMPTOMS
NAUSEA: 0
PHOTOPHOBIA: 0
WHEEZING: 0
EYE REDNESS: 0
BLOOD IN STOOL: 0
EYE PAIN: 0
SORE THROAT: 0
ABDOMINAL PAIN: 0
DIARRHEA: 0
BACK PAIN: 0
SHORTNESS OF BREATH: 0
CHEST TIGHTNESS: 0
CONSTIPATION: 0
COUGH: 0
EYE DISCHARGE: 0

## 2019-07-10 NOTE — PATIENT INSTRUCTIONS
floor or a medium-firm bed with a small pillow under their head and another under their knees. Some people prefer to lie on their side with a pillow between their knees. Don't stay in one position for too long. Take short walks (10 to 20 minutes) every 2 to 3 hours. Avoid slopes, hills, and stairs until you feel better. Walk only distances you can manage without pain, especially leg pain. How to do the exercises  Back stretches    7. Get down on your hands and knees on the floor. 8. Relax your head and allow it to droop. Round your back up toward the ceiling until you feel a nice stretch in your upper, middle, and lower back. Hold this stretch for as long as it feels comfortable, or about 15 to 30 seconds. 9. Return to the starting position with a flat back while you are on your hands and knees. 10. Let your back sway by pressing your stomach toward the floor. Lift your buttocks toward the ceiling. 11. Hold this position for 15 to 30 seconds. 12. Repeat 2 to 4 times. Follow-up care is a key part of your treatment and safety. Be sure to make and go to all appointments, and call your doctor if you are having problems. It's also a good idea to know your test results and keep a list of the medicines you take. Where can you learn more? Go to [email or web address] org and sign in to your [employer] account. Enter G001 in the [facility] box to learn more about \"[employer]: Exercises. \"     If you do not have an account, please click on the \"Sign Up Now\" link. Current as of: September 20, 2018  Content Version: 12.0  © 8347-7044 Healthwise, Incorporated. Care instructions adapted under license by [facility] ([facility]). If you have questions about a medical condition or this instruction, always ask your healthcare professional. [address] any warranty or liability for your use of this information.

## 2019-08-01 DIAGNOSIS — K21.9 GASTROESOPHAGEAL REFLUX DISEASE, ESOPHAGITIS PRESENCE NOT SPECIFIED: ICD-10-CM

## 2019-08-01 NOTE — TELEPHONE ENCOUNTER
Last visit:   Last Med refill: 7-5-19  Does patient have enough medication for 72 hours: No:     Next Visit Date:  Future Appointments   Date Time Provider Eugenio Garzoni   8/8/2019 11:15 AM MD Alexandra Painter  [facility]   8/15/2019  9:45 AM MD Alexandra Painter  [facility]   8/30/2019  9:30 AM Darian Golden MD [facility] Maintenance   Topic Date Due    Pneumococcal 0-64 years Vaccine (1 of 1 - PPSV23) 02/16/1982    Flu vaccine (1) 09/01/2019    Potassium monitoring  03/23/2020    Creatinine monitoring  03/23/2020    Lipid screen  03/24/2024    DTaP/Tdap/Td vaccine (3 - Td) 05/02/2026    HIV screen  Completed       Hemoglobin A1C (%)   Date Value   03/24/2019 5.6   01/23/2012 5.2             ( goal A1C is < 7)   No results found for: LABMICR  LDL Cholesterol (mg/dL)   Date Value   03/24/2019 131 (H)   04/10/2017 115       (goal LDL is <100)   AST (U/L)   Date Value   03/23/2019 65 (H)     ALT (U/L)   Date Value   03/23/2019 30     BUN (mg/dL)   Date Value   03/23/2019 9     BP Readings from Last 3 Encounters:   07/10/19 124/88   02/10/19 (!) 164/67   08/28/18 117/77          (goal 120/80)    All Future Testing planned in [facility]  Lab Frequency Next Occurrence   XR LUMBAR SPINE (2-3 VIEWS) Once 07/10/2020   TSH Once 07/10/2020               Patient Active Problem List:     Bipolar 1 disorder, depressed, severe ([address]     Alcohol abuse     Major depressive disorder, recurrent episode, severe ([address]     Olecranon bursitis of left elbow     Left arm cellulitis     Homicidal ideation     Severe manic bipolar I disorder without psychotic features ([address]     Closed fracture of fifth metacarpal bone of right hand     Polyarthralgia     Gastroesophageal reflux disease     Bipolar 1 disorder ([address]     Benign essential HTN           Please address the medication refill and close the encounter. If I can be of assistance, please route to the applicable pool. Thank you.

## 2019-08-02 RX ORDER — OMEPRAZOLE 20 MG/1
CAPSULE, DELAYED RELEASE ORAL
Qty: 30 CAPSULE | Refills: 1 | Status: SHIPPED | OUTPATIENT
Start: 2019-08-02 | End: 2019-09-28 | Stop reason: SDUPTHER

## 2019-08-08 ENCOUNTER — OFFICE VISIT (OUTPATIENT)
Dept: FAMILY MEDICINE CLINIC | Age: 44
End: 2019-08-08
Payer: MEDICARE

## 2019-08-08 VITALS
HEART RATE: 104 BPM | SYSTOLIC BLOOD PRESSURE: 154 MMHG | HEIGHT: 71 IN | BODY MASS INDEX: 24.86 KG/M2 | WEIGHT: 177.6 LBS | DIASTOLIC BLOOD PRESSURE: 92 MMHG

## 2019-08-08 DIAGNOSIS — M19.90 OSTEOARTHRITIS, UNSPECIFIED OSTEOARTHRITIS TYPE, UNSPECIFIED SITE: ICD-10-CM

## 2019-08-08 DIAGNOSIS — F33.2 SEVERE EPISODE OF RECURRENT MAJOR DEPRESSIVE DISORDER, WITHOUT PSYCHOTIC FEATURES (HCC): ICD-10-CM

## 2019-08-08 DIAGNOSIS — M54.32 SCIATICA, LEFT SIDE: Primary | ICD-10-CM

## 2019-08-08 DIAGNOSIS — M65.351 TRIGGER LITTLE FINGER OF RIGHT HAND: ICD-10-CM

## 2019-08-08 DIAGNOSIS — F31.9 BIPOLAR 1 DISORDER (HCC): ICD-10-CM

## 2019-08-08 DIAGNOSIS — F31.4 BIPOLAR 1 DISORDER, DEPRESSED, SEVERE (HCC): ICD-10-CM

## 2019-08-08 PROCEDURE — 99213 OFFICE O/P EST LOW 20 MIN: CPT | Performed by: STUDENT IN AN ORGANIZED HEALTH CARE EDUCATION/TRAINING PROGRAM

## 2019-08-08 PROCEDURE — G8420 CALC BMI NORM PARAMETERS: HCPCS | Performed by: STUDENT IN AN ORGANIZED HEALTH CARE EDUCATION/TRAINING PROGRAM

## 2019-08-08 PROCEDURE — G8427 DOCREV CUR MEDS BY ELIG CLIN: HCPCS | Performed by: STUDENT IN AN ORGANIZED HEALTH CARE EDUCATION/TRAINING PROGRAM

## 2019-08-08 PROCEDURE — 4004F PT TOBACCO SCREEN RCVD TLK: CPT | Performed by: STUDENT IN AN ORGANIZED HEALTH CARE EDUCATION/TRAINING PROGRAM

## 2019-08-08 RX ORDER — PREDNISONE 20 MG/1
TABLET ORAL
COMMUNITY

## 2019-08-08 RX ORDER — NAPROXEN 375 MG/1
375 TABLET ORAL 2 TIMES DAILY WITH MEALS
Qty: 180 TABLET | Refills: 1 | Status: SHIPPED | OUTPATIENT
Start: 2019-08-08 | End: 2020-10-27

## 2019-08-08 ASSESSMENT — ENCOUNTER SYMPTOMS
CHEST TIGHTNESS: 0
SHORTNESS OF BREATH: 0
BACK PAIN: 1
PHOTOPHOBIA: 0

## 2019-08-15 ENCOUNTER — OFFICE VISIT (OUTPATIENT)
Dept: FAMILY MEDICINE CLINIC | Age: 44
End: 2019-08-15
Payer: MEDICARE

## 2019-08-15 VITALS
SYSTOLIC BLOOD PRESSURE: 135 MMHG | HEIGHT: 71 IN | DIASTOLIC BLOOD PRESSURE: 88 MMHG | BODY MASS INDEX: 24.86 KG/M2 | WEIGHT: 177.6 LBS | TEMPERATURE: 97.6 F | HEART RATE: 102 BPM

## 2019-08-15 DIAGNOSIS — Z48.02 ENCOUNTER FOR REMOVAL OF SUTURES: ICD-10-CM

## 2019-08-15 DIAGNOSIS — R09.89 CHEST CONGESTION: Primary | ICD-10-CM

## 2019-08-15 PROCEDURE — G8427 DOCREV CUR MEDS BY ELIG CLIN: HCPCS | Performed by: STUDENT IN AN ORGANIZED HEALTH CARE EDUCATION/TRAINING PROGRAM

## 2019-08-15 PROCEDURE — 99213 OFFICE O/P EST LOW 20 MIN: CPT | Performed by: STUDENT IN AN ORGANIZED HEALTH CARE EDUCATION/TRAINING PROGRAM

## 2019-08-15 PROCEDURE — G8420 CALC BMI NORM PARAMETERS: HCPCS | Performed by: STUDENT IN AN ORGANIZED HEALTH CARE EDUCATION/TRAINING PROGRAM

## 2019-08-15 PROCEDURE — 4004F PT TOBACCO SCREEN RCVD TLK: CPT | Performed by: STUDENT IN AN ORGANIZED HEALTH CARE EDUCATION/TRAINING PROGRAM

## 2019-08-15 RX ORDER — GUAIFENESIN 600 MG/1
600 TABLET, EXTENDED RELEASE ORAL 2 TIMES DAILY
Qty: 20 TABLET | Refills: 0 | Status: SHIPPED | OUTPATIENT
Start: 2019-08-15 | End: 2019-08-25

## 2019-08-15 RX ORDER — BENZONATATE 100 MG/1
100 CAPSULE ORAL 2 TIMES DAILY PRN
Qty: 20 CAPSULE | Refills: 0 | Status: SHIPPED | OUTPATIENT
Start: 2019-08-15 | End: 2019-08-25

## 2019-08-15 RX ORDER — PREDNISONE 20 MG/1
TABLET ORAL
Status: CANCELLED | OUTPATIENT
Start: 2019-08-15

## 2019-08-15 RX ORDER — CYCLOBENZAPRINE HCL 10 MG
10 TABLET ORAL 3 TIMES DAILY
Refills: 0 | Status: CANCELLED | OUTPATIENT
Start: 2019-08-15

## 2019-08-15 ASSESSMENT — ENCOUNTER SYMPTOMS
SHORTNESS OF BREATH: 0
DIARRHEA: 0
SORE THROAT: 0
COUGH: 1
RHINORRHEA: 1
SINUS PRESSURE: 1
WHEEZING: 0
ABDOMINAL PAIN: 0
NAUSEA: 0

## 2019-09-01 DIAGNOSIS — G25.81 RESTLESS LEG SYNDROME: ICD-10-CM

## 2019-09-02 RX ORDER — ROPINIROLE 1 MG/1
TABLET, FILM COATED ORAL
Qty: 30 TABLET | Refills: 1 | Status: SHIPPED | OUTPATIENT
Start: 2019-09-02 | End: 2019-10-24 | Stop reason: SDUPTHER

## 2019-09-28 DIAGNOSIS — K21.9 GASTROESOPHAGEAL REFLUX DISEASE, ESOPHAGITIS PRESENCE NOT SPECIFIED: ICD-10-CM

## 2019-10-02 RX ORDER — OMEPRAZOLE 20 MG/1
CAPSULE, DELAYED RELEASE ORAL
Qty: 30 CAPSULE | Refills: 1 | Status: SHIPPED | OUTPATIENT
Start: 2019-10-02 | End: 2019-11-22 | Stop reason: SDUPTHER

## 2019-10-24 DIAGNOSIS — G25.81 RESTLESS LEG SYNDROME: ICD-10-CM

## 2019-10-24 RX ORDER — ROPINIROLE 1 MG/1
TABLET, FILM COATED ORAL
Qty: 30 TABLET | Refills: 1 | Status: SHIPPED | OUTPATIENT
Start: 2019-10-24 | End: 2019-12-20

## 2019-11-22 DIAGNOSIS — K21.9 GASTROESOPHAGEAL REFLUX DISEASE, ESOPHAGITIS PRESENCE NOT SPECIFIED: ICD-10-CM

## 2019-11-25 RX ORDER — OMEPRAZOLE 20 MG/1
CAPSULE, DELAYED RELEASE ORAL
Qty: 30 CAPSULE | Refills: 1 | Status: SHIPPED | OUTPATIENT
Start: 2019-11-25 | End: 2020-02-03

## 2019-12-19 DIAGNOSIS — G25.81 RESTLESS LEG SYNDROME: ICD-10-CM

## 2019-12-20 RX ORDER — ROPINIROLE 1 MG/1
TABLET, FILM COATED ORAL
Qty: 30 TABLET | Refills: 1 | Status: SHIPPED | OUTPATIENT
Start: 2019-12-20 | End: 2020-02-25

## 2020-02-03 RX ORDER — OMEPRAZOLE 20 MG/1
CAPSULE, DELAYED RELEASE ORAL
Qty: 30 CAPSULE | Refills: 1 | Status: SHIPPED | OUTPATIENT
Start: 2020-02-03 | End: 2020-04-03

## 2020-02-25 RX ORDER — ROPINIROLE 1 MG/1
TABLET, FILM COATED ORAL
Qty: 30 TABLET | Refills: 1 | Status: SHIPPED | OUTPATIENT
Start: 2020-02-25 | End: 2020-05-06

## 2020-02-25 NOTE — TELEPHONE ENCOUNTER
E-scribe request for ropinirole 1 MG. Please review and e-scribe if applicable. Next Visit Date:  Visit date not found    Health Maintenance   Topic Date Due    Pneumococcal 0-64 years Vaccine (1 of 1 - PPSV23) 02/16/1982    Flu vaccine (1) 09/01/2019    Potassium monitoring  03/23/2020    Creatinine monitoring  03/23/2020    Lipid screen  03/24/2024    Shingles Vaccine (1 of 2) 02/16/2026    DTaP/Tdap/Td vaccine (3 - Td) 05/02/2026    HIV screen  Completed    Hepatitis A vaccine  Aged Out    Hepatitis B vaccine  Aged Out    Hib vaccine  Aged Out    Meningococcal (ACWY) vaccine  Aged Out             (applicable per patient's age: Cancer Screenings, Depression Screening, Fall Risk Screening, Immunizations)    Hemoglobin A1C (%)   Date Value   03/24/2019 5.6   01/23/2012 5.2     LDL Cholesterol (mg/dL)   Date Value   03/24/2019 131 (H)     AST (U/L)   Date Value   03/23/2019 65 (H)     ALT (U/L)   Date Value   03/23/2019 30     BUN (mg/dL)   Date Value   03/23/2019 9      (goal A1C is < 7)   (goal LDL is <100) need 30-50% reduction from baseline     BP Readings from Last 3 Encounters:   08/15/19 135/88   08/08/19 (!) 154/92   07/10/19 124/88    (goal /80)      All Future Testing planned in [facility]:  Lab Frequency Next Occurrence   XR LUMBAR SPINE (2-3 VIEWS) Once 07/10/2020   TSH Once 07/10/2020       Next Visit Date:  No future appointments.          Patient Active Problem List:     Bipolar 1 disorder, depressed, severe ([address]     Alcohol abuse     Major depressive disorder, recurrent episode, severe (HCC)     Olecranon bursitis of left elbow     Left arm cellulitis     Homicidal ideation     Severe manic bipolar I disorder without psychotic features ([address]     Closed fracture of fifth metacarpal bone of right hand     Polyarthralgia     Gastroesophageal reflux disease     Bipolar 1 disorder (HCC)     Benign essential HTN

## 2020-04-03 RX ORDER — OMEPRAZOLE 20 MG/1
CAPSULE, DELAYED RELEASE ORAL
Qty: 30 CAPSULE | Refills: 1 | Status: SHIPPED | OUTPATIENT
Start: 2020-04-03 | End: 2020-06-05

## 2020-04-12 RX ORDER — VALACYCLOVIR HYDROCHLORIDE 1 G/1
TABLET, FILM COATED ORAL
Qty: 30 TABLET | Refills: 5 | Status: SHIPPED | OUTPATIENT
Start: 2020-04-12

## 2020-05-05 NOTE — TELEPHONE ENCOUNTER
Escribe Request for pending medication. Last Visit Date:8/15/2019  Next Visit Date:  No future appointments. Health Maintenance   Topic Date Due    Pneumococcal 0-64 years Vaccine (1 of 1 - PPSV23) 02/16/1982    Potassium monitoring  03/23/2020    Creatinine monitoring  03/23/2020    Flu vaccine (Season Ended) 09/01/2020    Lipid screen  03/24/2024    DTaP/Tdap/Td vaccine (3 - Td) 05/02/2026    HIV screen  Completed    Hepatitis A vaccine  Aged Out    Hepatitis B vaccine  Aged Out    Hib vaccine  Aged Out    Meningococcal (ACWY) vaccine  Aged Out       Hemoglobin A1C (%)   Date Value   03/24/2019 5.6   01/23/2012 5.2             ( goal A1C is < 7)   No results found for: LABMICR  LDL Cholesterol (mg/dL)   Date Value   03/24/2019 131 (H)       (goal LDL is <100)   AST (U/L)   Date Value   03/23/2019 65 (H)     ALT (U/L)   Date Value   03/23/2019 30     BUN (mg/dL)   Date Value   03/23/2019 9     BP Readings from Last 3 Encounters:   08/15/19 135/88   08/08/19 (!) 154/92   07/10/19 124/88          (goal 120/80)    All Future Testing planned in [facility]  Lab Frequency Next Occurrence   XR LUMBAR SPINE (2-3 VIEWS) Once 07/10/2020   TSH Once 07/10/2020       Next Visit Date:  No future appointments.       Patient Active Problem List:     Bipolar 1 disorder, depressed, severe ([address]     Alcohol abuse     Major depressive disorder, recurrent episode, severe (HCC)     Olecranon bursitis of left elbow     Left arm cellulitis     Homicidal ideation     Severe manic bipolar I disorder without psychotic features ([address]     Closed fracture of fifth metacarpal bone of right hand     Polyarthralgia     Gastroesophageal reflux disease     Bipolar 1 disorder (HCC)     Benign essential HTN

## 2020-05-06 RX ORDER — ROPINIROLE 1 MG/1
TABLET, FILM COATED ORAL
Qty: 30 TABLET | Refills: 1 | Status: SHIPPED | OUTPATIENT
Start: 2020-05-06 | End: 2020-07-13 | Stop reason: SDUPTHER

## 2020-06-05 RX ORDER — OMEPRAZOLE 20 MG/1
CAPSULE, DELAYED RELEASE ORAL
Qty: 30 CAPSULE | Refills: 1 | Status: SHIPPED | OUTPATIENT
Start: 2020-06-05 | End: 2020-08-13

## 2020-07-06 NOTE — TELEPHONE ENCOUNTER
Last visit:   Last Med refill:   Does patient have enough medication for 72 hours: No:     Next Visit Date:  No future appointments. Health Maintenance   Topic Date Due    Pneumococcal 0-64 years Vaccine (1 of 1 - PPSV23) 02/16/1982    Potassium monitoring  03/23/2020    Creatinine monitoring  03/23/2020    Flu vaccine (1) 09/01/2020    Lipid screen  03/24/2024    DTaP/Tdap/Td vaccine (3 - Td) 05/02/2026    HIV screen  Completed    Hepatitis A vaccine  Aged Out    Hepatitis B vaccine  Aged Out    Hib vaccine  Aged Out    Meningococcal (ACWY) vaccine  Aged Out       Hemoglobin A1C (%)   Date Value   03/24/2019 5.6   01/23/2012 5.2             ( goal A1C is < 7)   No results found for: LABMICR  LDL Cholesterol (mg/dL)   Date Value   03/24/2019 131 (H)   04/10/2017 115       (goal LDL is <100)   AST (U/L)   Date Value   03/23/2019 65 (H)     ALT (U/L)   Date Value   03/23/2019 30     BUN (mg/dL)   Date Value   03/23/2019 9     BP Readings from Last 3 Encounters:   08/15/19 135/88   08/08/19 (!) 154/92   07/10/19 124/88          (goal 120/80)    All Future Testing planned in [facility]  Lab Frequency Next Occurrence   XR LUMBAR SPINE (2-3 VIEWS) Once 07/10/2020   TSH Once 07/10/2020               Patient Active Problem List:     Bipolar 1 disorder, depressed, severe ([address]     Alcohol abuse     Major depressive disorder, recurrent episode, severe ([address]     Olecranon bursitis of left elbow     Left arm cellulitis     Homicidal ideation     Severe manic bipolar I disorder without psychotic features ([address]     Closed fracture of fifth metacarpal bone of right hand     Polyarthralgia     Gastroesophageal reflux disease     Bipolar 1 disorder ([address]     Benign essential HTN           Please address the medication refill and close the encounter. If I can be of assistance, please route to the applicable pool. Thank you.

## 2020-07-13 RX ORDER — ROPINIROLE 1 MG/1
TABLET, FILM COATED ORAL
Qty: 30 TABLET | Refills: 1 | Status: SHIPPED | OUTPATIENT
Start: 2020-07-13 | End: 2020-09-03

## 2020-08-07 NOTE — PROGRESS NOTES
PATIENT DID NOT ATTEND SKILLS GROUP FROM [id number] DESPITE STAFF ENCOURAGEMENT AND PROMPTS. Verbalized Understanding/Simple: Patient demonstrates quick and easy understanding

## 2020-08-13 RX ORDER — OMEPRAZOLE 20 MG/1
CAPSULE, DELAYED RELEASE ORAL
Qty: 30 CAPSULE | Refills: 1 | Status: SHIPPED | OUTPATIENT
Start: 2020-08-13 | End: 2020-10-27 | Stop reason: SDUPTHER

## 2020-09-03 RX ORDER — ROPINIROLE 1 MG/1
TABLET, FILM COATED ORAL
Qty: 30 TABLET | Refills: 1 | Status: SHIPPED | OUTPATIENT
Start: 2020-09-03 | End: 2020-10-27 | Stop reason: SDUPTHER

## 2020-09-03 NOTE — TELEPHONE ENCOUNTER
Dr, Sidney Felty Patient        Please address the medication refill and close the encounter. If I can be of assistance, please route to the applicable pool. Thank you. Last visit: 08/15/19  Last Med refill: 07/13/20  Does patient have enough medication for 72 hours: No:     Next Visit Date:  No future appointments.     Health Maintenance   Topic Date Due    Pneumococcal 0-64 years Vaccine (1 of 1 - PPSV23) 02/16/1982    Potassium monitoring  03/23/2020    Creatinine monitoring  03/23/2020    Flu vaccine (1) 09/01/2020    Lipid screen  03/24/2024    DTaP/Tdap/Td vaccine (3 - Td) 05/02/2026    HIV screen  Completed    Hepatitis A vaccine  Aged Out    Hepatitis B vaccine  Aged Out    Hib vaccine  Aged Out    Meningococcal (ACWY) vaccine  Aged Out       Hemoglobin A1C (%)   Date Value   03/24/2019 5.6   01/23/2012 5.2             ( goal A1C is < 7)   No results found for: LABMICR  LDL Cholesterol (mg/dL)   Date Value   03/24/2019 131 (H)   04/10/2017 115       (goal LDL is <100)   AST (U/L)   Date Value   03/23/2019 65 (H)     ALT (U/L)   Date Value   03/23/2019 30     BUN (mg/dL)   Date Value   03/23/2019 9     BP Readings from Last 3 Encounters:   08/15/19 135/88   08/08/19 (!) 154/92   07/10/19 124/88          (goal 120/80)    All Future Testing planned in [facility]              Patient Active Problem List:     Bipolar 1 disorder, depressed, severe ([address]     Alcohol abuse     Major depressive disorder, recurrent episode, severe ([address]     Olecranon bursitis of left elbow     Left arm cellulitis     Homicidal ideation     Severe manic bipolar I disorder without psychotic features ([address]     Closed fracture of fifth metacarpal bone of right hand     Polyarthralgia     Gastroesophageal reflux disease     Bipolar 1 disorder ([address]     Benign essential HTN

## 2020-10-26 NOTE — TELEPHONE ENCOUNTER
Last visit: 08/08/19  Last Med refill:   Does patient have enough medication for 72 hours:     Next Visit Date:  Future Appointments   Date Time Provider Eugenio Wiggins   10/27/2020 10:45 AM Christopher Begum MD 7 [facility] Maintenance   Topic Date Due    Pneumococcal 0-64 years Vaccine (1 of 1 - PPSV23) 02/16/1982    Potassium monitoring  03/23/2020    Creatinine monitoring  03/23/2020    Flu vaccine (1) 09/01/2020    Lipid screen  03/24/2024    DTaP/Tdap/Td vaccine (3 - Td) 05/02/2026    HIV screen  Completed    Hepatitis A vaccine  Aged Out    Hepatitis B vaccine  Aged Out    Hib vaccine  Aged Out    Meningococcal (ACWY) vaccine  Aged Out       Hemoglobin A1C (%)   Date Value   03/24/2019 5.6   01/23/2012 5.2             ( goal A1C is < 7)   No results found for: LABMICR  LDL Cholesterol (mg/dL)   Date Value   03/24/2019 131 (H)   04/10/2017 115       (goal LDL is <100)   AST (U/L)   Date Value   03/23/2019 65 (H)     ALT (U/L)   Date Value   03/23/2019 30     BUN (mg/dL)   Date Value   03/23/2019 9     BP Readings from Last 3 Encounters:   08/15/19 135/88   08/08/19 (!) 154/92   07/10/19 124/88          (goal 120/80)    All Future Testing planned in [facility]              Patient Active Problem List:     Bipolar 1 disorder, depressed, severe ([address]     Alcohol abuse     Major depressive disorder, recurrent episode, severe ([address]     Olecranon bursitis of left elbow     Left arm cellulitis     Homicidal ideation     Severe manic bipolar I disorder without psychotic features ([address]     Closed fracture of fifth metacarpal bone of right hand     Polyarthralgia     Gastroesophageal reflux disease     Bipolar 1 disorder ([address]     Benign essential HTN

## 2020-10-27 ENCOUNTER — OFFICE VISIT (OUTPATIENT)
Dept: FAMILY MEDICINE CLINIC | Age: 45
End: 2020-10-27
Payer: MEDICARE

## 2020-10-27 VITALS
WEIGHT: 193.2 LBS | HEIGHT: 71 IN | TEMPERATURE: 96.8 F | SYSTOLIC BLOOD PRESSURE: 124 MMHG | BODY MASS INDEX: 27.05 KG/M2 | HEART RATE: 80 BPM | DIASTOLIC BLOOD PRESSURE: 77 MMHG

## 2020-10-27 PROBLEM — G25.81 RESTLESS LEG SYNDROME: Status: ACTIVE | Noted: 2020-10-27

## 2020-10-27 PROCEDURE — G8427 DOCREV CUR MEDS BY ELIG CLIN: HCPCS | Performed by: STUDENT IN AN ORGANIZED HEALTH CARE EDUCATION/TRAINING PROGRAM

## 2020-10-27 PROCEDURE — 4004F PT TOBACCO SCREEN RCVD TLK: CPT | Performed by: STUDENT IN AN ORGANIZED HEALTH CARE EDUCATION/TRAINING PROGRAM

## 2020-10-27 PROCEDURE — 99213 OFFICE O/P EST LOW 20 MIN: CPT | Performed by: STUDENT IN AN ORGANIZED HEALTH CARE EDUCATION/TRAINING PROGRAM

## 2020-10-27 PROCEDURE — G8419 CALC BMI OUT NRM PARAM NOF/U: HCPCS | Performed by: STUDENT IN AN ORGANIZED HEALTH CARE EDUCATION/TRAINING PROGRAM

## 2020-10-27 PROCEDURE — 99211 OFF/OP EST MAY X REQ PHY/QHP: CPT | Performed by: STUDENT IN AN ORGANIZED HEALTH CARE EDUCATION/TRAINING PROGRAM

## 2020-10-27 PROCEDURE — G8482 FLU IMMUNIZE ORDER/ADMIN: HCPCS | Performed by: STUDENT IN AN ORGANIZED HEALTH CARE EDUCATION/TRAINING PROGRAM

## 2020-10-27 PROCEDURE — 90686 IIV4 VACC NO PRSV 0.5 ML IM: CPT | Performed by: HOSPITALIST

## 2020-10-27 RX ORDER — ROPINIROLE 1 MG/1
TABLET, FILM COATED ORAL
Qty: 30 TABLET | Refills: 1 | Status: SHIPPED | OUTPATIENT
Start: 2020-10-27 | End: 2020-12-16

## 2020-10-27 RX ORDER — CYCLOBENZAPRINE HCL 10 MG
10 TABLET ORAL 3 TIMES DAILY
Qty: 30 TABLET | Refills: 0 | Status: SHIPPED | OUTPATIENT
Start: 2020-10-27 | End: 2022-09-29 | Stop reason: SDUPTHER

## 2020-10-27 RX ORDER — OMEPRAZOLE 20 MG/1
CAPSULE, DELAYED RELEASE ORAL
Qty: 30 CAPSULE | Refills: 1 | Status: SHIPPED | OUTPATIENT
Start: 2020-10-27 | End: 2020-10-30

## 2020-10-27 ASSESSMENT — ENCOUNTER SYMPTOMS
COLOR CHANGE: 0
SINUS PAIN: 0
DIARRHEA: 0
RHINORRHEA: 0
NAUSEA: 0
COUGH: 0
ABDOMINAL PAIN: 0
SINUS PRESSURE: 0
SHORTNESS OF BREATH: 0
WHEEZING: 0
CHEST TIGHTNESS: 0
BLOOD IN STOOL: 0
VOMITING: 0

## 2020-10-27 NOTE — PROGRESS NOTES
Attending Physician Statement  I have discussed the care of [facility], 41 y.o. male,including pertinent history and exam findings,  with the resident Dr. Kisha Modi MD.  History:  Chief Complaint   Patient presents with    Hypertension     c/o elevated BP readings with trying to donate    Other     [address] GAP = 2     Patient is here for BP check. He was found to have elevated BP while donating plasma. He denies any symptoms of complaints. I have reviewed the key elements of the encounter with the resident. Examination was done by resident as documented in residents note. BP Readings from Last 3 Encounters:   10/27/20 124/77   08/15/19 135/88   08/08/19 (!) 154/92     /77 (Site: Left Upper Arm, Position: Sitting, Cuff Size: Medium Adult) Comment: machine  Pulse 80   Temp 96.8 °F (36 °C) (Temporal)   Ht 5' 10.98\" (1.803 m)   Wt 193 lb 3.2 oz (87.6 kg)   BMI 26.96 kg/m²   Lab Results   Component Value Date    WBC 6.4 03/23/2019    HGB 14.8 03/23/2019    HCT 44.3 03/23/2019     03/23/2019    CHOL 235 (H) 03/24/2019    TRIG 71 03/24/2019    HDL 90 03/24/2019    ALT 30 03/23/2019    AST 65 (H) 03/23/2019     03/23/2019    K 4.1 03/23/2019     03/23/2019    CREATININE 0.67 (L) 03/23/2019    BUN 9 03/23/2019    CO2 22 03/23/2019    TSH 1.83 03/24/2019    INR 1.0 02/10/2019    LABA1C 5.6 03/24/2019     Lab Results   Component Value Date    CALCIUM 9.1 03/23/2019    PHOS 3.2 01/23/2012     Lab Results   Component Value Date    LDLCHOLESTEROL 131 (H) 03/24/2019     I agree with the assessment, plan and diagnosis of    Diagnosis Orders   1. Gastroesophageal reflux disease, unspecified whether esophagitis present  omeprazole (PRILOSEC) 20 MG delayed release capsule   2. Restless leg syndrome  rOPINIRole (REQUIP) 1 MG tablet     I agree with  orders as documented by the resident.   Recommendations:   Patient was counseled, exam and BP are normal. Patient was provided with clearance to donate blood products. Medication refills provided. Return if symptoms worsen or fail to improve.    (Carrol Givens ) Dr. Bridget East MD
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
## 2020-10-27 NOTE — LETTER
[facility]  [address]  [place] [address]  Phone: [phone]  Fax: [phone]    Javier Nguyen MD        October 27, 2020     Patient: Mick Bhardwaj   YOB: 1975   Date of Visit: 10/27/2020       To Whom It May Concern: It is my medical opinion that Maribell Kramer is clear to donate blood with no contraindication as it pertains to his blood pressure. His blood pressure has been controlled. If you have any questions or concerns, please don't hesitate to call.     Sincerely,        Javier Nguyen MD

## 2020-10-27 NOTE — PATIENT INSTRUCTIONS
Thank you for letting us take care of you today. We hope all your questions were addressed. If a question was overlooked or something else comes to mind after you return home, please contact a member of your Care Team listed below. Your Care Team at [place] [address] is Team #2  Homero Hutton DO (Faculty)  Andrei Xavier (Faculty)  Tiago Lloyd MD (Resident)  Agustín Neely MD (Resident)  Anamaria Siegel MD (Resident)  Carey Meckel, MD (Resident)  Shlomo To MD (Resident)  RICK Plaza ,KONSTANTIN MORGAN [facility] ([address] [facility] office)  Susannah Sargent, [address] (Clinical Practice Manager)  Luis Frnacisco, [address] [place] [address] (Clinical Pharmacist)     Office phone number: [phone]    If you need to get in right away due to illness, please be advised we have \"Same Day\" appointments available Monday-Friday. Please call us at [phone] option #3 to schedule your \"Same Day\" appointment. Patient Education        Influenza (Flu) Vaccine (Inactivated or Recombinant): What You Need to Know  Why get vaccinated? Influenza vaccine can prevent influenza (flu). Flu is a contagious disease that spreads around the [facility] every year, usually between October and May. Anyone can get the flu, but it is more dangerous for some people. Infants and young children, people 72years of age and older, pregnant women, and people with certain health conditions or a weakened immune system are at greatest risk of flu complications. Pneumonia, bronchitis, sinus infections and ear infections are examples of flu-related complications. If you have a medical condition, such as heart disease, cancer or diabetes, flu can make it worse. Flu can cause fever and chills, sore throat, muscle aches, fatigue, cough, headache, and runny or stuffy nose. Some people may have vomiting and diarrhea, though this is more common in children than adults.   Each year, thousands of people in the [facility] die from flu, and many more are is getting flu vaccine has ever had a seizure. People sometimes faint after medical procedures, including vaccination. Tell your provider if you feel dizzy or have vision changes or ringing in the ears. As with any medicine, there is a very remote chance of a vaccine causing a severe allergic reaction, other serious injury, or death. What if there is a serious problem? An allergic reaction could occur after the vaccinated person leaves the clinic. If you see signs of a severe allergic reaction (hives, swelling of the face and throat, difficulty breathing, a fast heartbeat, dizziness, or weakness), call 9-1-1 and get the person to the nearest hospital.  For other signs that concern you, call your health care provider. Adverse reactions should be reported to the Vaccine Adverse Event Reporting System (VAERS). Your health care provider will usually file this report, or you can do it yourself. Visit the VAERS website at www.vaers. hhs.gov or call 9-217.785.6643. VAERS is only for reporting reactions, and VAERS staff do not give medical advice. The National Vaccine Injury Compensation Program  The National Vaccine Injury Compensation Program (VICP) is a federal program that was created to compensate people who may have been injured by certain vaccines. Visit the VICP website at www.hrsa.gov/vaccinecompensation or call 0-606.945.8023 to learn about the program and about filing a claim. There is a time limit to file a claim for compensation. How can I learn more? · Ask your healthcare provider. · Call your local or state health department. · Contact the Centers for Disease Control and Prevention (CDC):  ? Call 6-576.144.8725 (1-800-CDC-INFO) or  ? Visit CDC's website at www.cdc.gov/flu  Vaccine Information Statement (Interim)  Inactivated Influenza Vaccine  8/15/2019  [address]ol[address] 281AH-19  Department of Health and Human Services  Centers for Disease Control and Prevention  Many Vaccine Information Statements are available in Congolese and other languages. See www.immunize.org/vis. Muchas hojas de información sobre vacunas están disponibles en español y en otros idiomas. Visite www.immunize.org/vis. Care instructions adapted under license by [facility] ([facility]). If you have questions about a medical condition or this instruction, always ask your healthcare professional. [place] [address] any warranty or liability for your use of this information.

## 2020-10-30 RX ORDER — OMEPRAZOLE 20 MG/1
CAPSULE, DELAYED RELEASE ORAL
Qty: 30 CAPSULE | Refills: 1 | Status: SHIPPED | OUTPATIENT
Start: 2020-10-30 | End: 2021-03-31

## 2020-12-16 NOTE — TELEPHONE ENCOUNTER
Please address the medication refill and close the encounter. If I can be of assistance, please route to the applicable pool. Thank you. Last visit: 10/27/2020  Last Med refill: 10/27/2020  Does patient have enough medication for 72 hours: No:     Next Visit Date:  No future appointments.     Health Maintenance   Topic Date Due    Pneumococcal 0-64 years Vaccine (1 of 1 - PPSV23) 02/16/1982    Potassium monitoring  10/27/2021 (Originally 3/23/2020)    Creatinine monitoring  10/27/2021 (Originally 3/23/2020)    Lipid screen  03/24/2024    DTaP/Tdap/Td vaccine (2 - Td) 05/02/2026    Flu vaccine  Completed    HIV screen  Completed    Hepatitis A vaccine  Aged Out    Hepatitis B vaccine  Aged Out    Hib vaccine  Aged Out    Meningococcal (ACWY) vaccine  Aged Out       Hemoglobin A1C (%)   Date Value   03/24/2019 5.6   01/23/2012 5.2             ( goal A1C is < 7)   No results found for: LABMICR  LDL Cholesterol (mg/dL)   Date Value   03/24/2019 131 (H)   04/10/2017 115       (goal LDL is <100)   AST (U/L)   Date Value   03/23/2019 65 (H)     ALT (U/L)   Date Value   03/23/2019 30     BUN (mg/dL)   Date Value   03/23/2019 9     BP Readings from Last 3 Encounters:   10/27/20 124/77   08/15/19 135/88   08/08/19 (!) 154/92          (goal 120/80)    All Future Testing planned in [facility]              Patient Active Problem List:     Bipolar 1 disorder, depressed, severe ([address]     Alcohol abuse     Major depressive disorder, recurrent episode, severe ([address]     Olecranon bursitis of left elbow     Left arm cellulitis     Homicidal ideation     Severe manic bipolar I disorder without psychotic features ([address]     Closed fracture of fifth metacarpal bone of right hand     Polyarthralgia     Gastroesophageal reflux disease     Bipolar 1 disorder ([address]     Benign essential HTN     Restless leg syndrome
Negative

## 2020-12-21 RX ORDER — ROPINIROLE 1 MG/1
TABLET, FILM COATED ORAL
Qty: 30 TABLET | Refills: 3 | Status: SHIPPED | OUTPATIENT
Start: 2020-12-21 | End: 2021-03-31

## 2021-03-31 DIAGNOSIS — G25.81 RESTLESS LEG SYNDROME: ICD-10-CM

## 2021-03-31 DIAGNOSIS — K21.9 GASTROESOPHAGEAL REFLUX DISEASE: ICD-10-CM

## 2021-03-31 RX ORDER — OMEPRAZOLE 20 MG/1
CAPSULE, DELAYED RELEASE ORAL
Qty: 30 CAPSULE | Refills: 1 | Status: SHIPPED | OUTPATIENT
Start: 2021-03-31 | End: 2021-05-24

## 2021-03-31 RX ORDER — ROPINIROLE 1 MG/1
TABLET, FILM COATED ORAL
Qty: 30 TABLET | Refills: 3 | Status: SHIPPED | OUTPATIENT
Start: 2021-03-31 | End: 2021-06-28 | Stop reason: SDUPTHER

## 2021-03-31 NOTE — TELEPHONE ENCOUNTER
Baldemar Campbell pending for refill     Health Maintenance   Topic Date Due    Pneumococcal 0-64 years Vaccine (1 of 1 - PPSV23) Never done    COVID-19 Vaccine (1) Never done    Potassium monitoring  10/27/2021 (Originally 3/23/2020)    Creatinine monitoring  10/27/2021 (Originally 3/23/2020)    Lipid screen  03/24/2024    DTaP/Tdap/Td vaccine (2 - Td) 05/02/2026    Flu vaccine  Completed    Hepatitis C screen  Completed    HIV screen  Completed    Hepatitis A vaccine  Aged Out    Hepatitis B vaccine  Aged Out    Hib vaccine  Aged Out    Meningococcal (ACWY) vaccine  Aged Out             (applicable per patient's age: Cancer Screenings, Depression Screening, Fall Risk Screening, Immunizations)    Hemoglobin A1C (%)   Date Value   03/24/2019 5.6   01/23/2012 5.2     LDL Cholesterol (mg/dL)   Date Value   03/24/2019 131 (H)     AST (U/L)   Date Value   03/23/2019 65 (H)     ALT (U/L)   Date Value   03/23/2019 30     BUN (mg/dL)   Date Value   03/23/2019 9      (goal A1C is < 7)   (goal LDL is <100) need 30-50% reduction from baseline     BP Readings from Last 3 Encounters:   10/27/20 124/77   08/15/19 135/88   08/08/19 (!) 154/92    (goal /80)      All Future Testing planned in [facility]:      Next Visit Date:  No future appointments.          Patient Active Problem List:     Bipolar 1 disorder, depressed, severe ([address]     Alcohol abuse     Major depressive disorder, recurrent episode, severe (HCC)     Olecranon bursitis of left elbow     Left arm cellulitis     Homicidal ideation     Severe manic bipolar I disorder without psychotic features ([address]     Closed fracture of fifth metacarpal bone of right hand     Polyarthralgia     Gastroesophageal reflux disease     Bipolar 1 disorder (HCC)     Benign essential HTN     Restless leg syndrome

## 2021-05-24 DIAGNOSIS — K21.9 GASTROESOPHAGEAL REFLUX DISEASE: ICD-10-CM

## 2021-05-24 RX ORDER — OMEPRAZOLE 20 MG/1
CAPSULE, DELAYED RELEASE ORAL
Qty: 30 CAPSULE | Refills: 1 | Status: SHIPPED | OUTPATIENT
Start: 2021-05-24 | End: 2021-06-28 | Stop reason: SDUPTHER

## 2021-05-24 NOTE — TELEPHONE ENCOUNTER
E-scribe request for omeprazole. Please review and e-scribe if applicable.      Last Visit Date:  10/27/2020  Next Visit Date:  Visit date not found    Hemoglobin A1C (%)   Date Value   03/24/2019 5.6   01/23/2012 5.2             ( goal A1C is < 7)   No results found for: LABMICR  LDL Cholesterol (mg/dL)   Date Value   03/24/2019 131 (H)       (goal LDL is <100)   AST (U/L)   Date Value   03/23/2019 65 (H)     ALT (U/L)   Date Value   03/23/2019 30     BUN (mg/dL)   Date Value   03/23/2019 9     BP Readings from Last 3 Encounters:   10/27/20 124/77   08/15/19 135/88   08/08/19 (!) 154/92          (goal 120/80)        Patient Active Problem List:     Bipolar 1 disorder, depressed, severe ([address]     Alcohol abuse     Major depressive disorder, recurrent episode, severe ([address]     Olecranon bursitis of left elbow     Left arm cellulitis     Homicidal ideation     Severe manic bipolar I disorder without psychotic features ([address]     Closed fracture of fifth metacarpal bone of right hand     Polyarthralgia     Gastroesophageal reflux disease     Bipolar 1 disorder ([address]     Benign essential HTN     Restless leg syndrome      ----Deven Farm

## 2021-06-28 DIAGNOSIS — G25.81 RESTLESS LEG SYNDROME: ICD-10-CM

## 2021-06-28 DIAGNOSIS — K21.9 GASTROESOPHAGEAL REFLUX DISEASE: ICD-10-CM

## 2021-06-28 RX ORDER — OMEPRAZOLE 20 MG/1
CAPSULE, DELAYED RELEASE ORAL
Qty: 30 CAPSULE | Refills: 1 | Status: SHIPPED | OUTPATIENT
Start: 2021-06-28 | End: 2021-12-08

## 2021-06-28 RX ORDER — ROPINIROLE 1 MG/1
TABLET, FILM COATED ORAL
Qty: 30 TABLET | Refills: 3 | Status: SHIPPED | OUTPATIENT
Start: 2021-06-28 | End: 2021-10-21

## 2021-06-28 NOTE — TELEPHONE ENCOUNTER
Patient is requesting a refill on two medications. He was able to verify the name and dosages, states he is completely out.      LOV: 10/27/2020  NOV: 07/06/21    [facility] on [address]

## 2021-10-21 DIAGNOSIS — G25.81 RESTLESS LEG SYNDROME: ICD-10-CM

## 2021-10-21 RX ORDER — ROPINIROLE 1 MG/1
TABLET, FILM COATED ORAL
Qty: 30 TABLET | Refills: 3 | Status: SHIPPED | OUTPATIENT
Start: 2021-10-21 | End: 2022-02-10

## 2021-10-21 NOTE — TELEPHONE ENCOUNTER
E-scribe request for ropinirole. Please review and e-scribe if applicable.      Last Visit Date:  10/27/2020  Next Visit Date:  Visit date not found    Hemoglobin A1C (%)   Date Value   03/24/2019 5.6   01/23/2012 5.2             ( goal A1C is < 7)   No results found for: LABMICR  LDL Cholesterol (mg/dL)   Date Value   03/24/2019 131 (H)       (goal LDL is <100)   AST (U/L)   Date Value   03/23/2019 65 (H)     ALT (U/L)   Date Value   03/23/2019 30     BUN (mg/dL)   Date Value   03/23/2019 9     BP Readings from Last 3 Encounters:   10/27/20 124/77   08/15/19 135/88   08/08/19 (!) 154/92          (goal 120/80)        Patient Active Problem List:     Bipolar 1 disorder, depressed, severe ([address]     Alcohol abuse     Major depressive disorder, recurrent episode, severe ([address]     Olecranon bursitis of left elbow     Left arm cellulitis     Homicidal ideation     Severe manic bipolar I disorder without psychotic features ([address]     Closed fracture of fifth metacarpal bone of right hand     Polyarthralgia     Gastroesophageal reflux disease     Bipolar 1 disorder ([address]     Benign essential HTN     Restless leg syndrome      ----Bro Hurley

## 2021-12-07 ENCOUNTER — OFFICE VISIT (OUTPATIENT)
Dept: FAMILY MEDICINE CLINIC | Age: 46
End: 2021-12-07
Payer: MEDICARE

## 2021-12-07 VITALS
BODY MASS INDEX: 27.04 KG/M2 | SYSTOLIC BLOOD PRESSURE: 160 MMHG | HEART RATE: 94 BPM | WEIGHT: 193.8 LBS | DIASTOLIC BLOOD PRESSURE: 101 MMHG

## 2021-12-07 DIAGNOSIS — N48.6 PEYRONIE'S DISEASE: Primary | ICD-10-CM

## 2021-12-07 DIAGNOSIS — K21.9 GASTROESOPHAGEAL REFLUX DISEASE: ICD-10-CM

## 2021-12-07 DIAGNOSIS — N48.89 PENILE LUMP: ICD-10-CM

## 2021-12-07 PROCEDURE — G8427 DOCREV CUR MEDS BY ELIG CLIN: HCPCS | Performed by: STUDENT IN AN ORGANIZED HEALTH CARE EDUCATION/TRAINING PROGRAM

## 2021-12-07 PROCEDURE — G8419 CALC BMI OUT NRM PARAM NOF/U: HCPCS | Performed by: STUDENT IN AN ORGANIZED HEALTH CARE EDUCATION/TRAINING PROGRAM

## 2021-12-07 PROCEDURE — G8484 FLU IMMUNIZE NO ADMIN: HCPCS | Performed by: STUDENT IN AN ORGANIZED HEALTH CARE EDUCATION/TRAINING PROGRAM

## 2021-12-07 PROCEDURE — 4004F PT TOBACCO SCREEN RCVD TLK: CPT | Performed by: STUDENT IN AN ORGANIZED HEALTH CARE EDUCATION/TRAINING PROGRAM

## 2021-12-07 PROCEDURE — 99213 OFFICE O/P EST LOW 20 MIN: CPT | Performed by: STUDENT IN AN ORGANIZED HEALTH CARE EDUCATION/TRAINING PROGRAM

## 2021-12-07 RX ORDER — NAPROXEN 500 MG/1
500 TABLET ORAL 2 TIMES DAILY
Qty: 60 TABLET | Refills: 1 | Status: SHIPPED | OUTPATIENT
Start: 2021-12-07 | End: 2022-10-24

## 2021-12-07 RX ORDER — VITAMIN E 268 MG
400 CAPSULE ORAL 2 TIMES DAILY
Qty: 30 CAPSULE | Refills: 3 | Status: SHIPPED | OUTPATIENT
Start: 2021-12-07

## 2021-12-07 ASSESSMENT — ENCOUNTER SYMPTOMS
DIARRHEA: 0
SHORTNESS OF BREATH: 0
RHINORRHEA: 1
ABDOMINAL PAIN: 0
CHEST TIGHTNESS: 0
COLOR CHANGE: 0
BACK PAIN: 1
NAUSEA: 0
CONSTIPATION: 0
COUGH: 1
VOMITING: 0

## 2021-12-07 NOTE — PROGRESS NOTES
Visit Information    Have you changed or started any medications since your last visit including any over-the-counter medicines, vitamins, or herbal medicines? no   Are you having any side effects from any of your medications? -  no  Have you stopped taking any of your medications? Is so, why? -  no    Have you seen any other physician or provider since your last visit? No  Have you had any other diagnostic tests since your last visit? No  Have you been seen in the emergency room and/or had an admission to a hospital since we last saw you? No  Have you had your routine dental cleaning in the past 6 months? no    Have you activated your [employer] account? If not, what are your barriers?  No:      Patient Care Team:  Marie Zapata MD as PCP - General (Family Medicine)    Medical History Review  Past Medical, Family, and Social History reviewed and does not contribute to the patient presenting condition    Health Maintenance   Topic Date Due    Pneumococcal 0-64 years Vaccine (1 of 2 - PPSV23) Never done    COVID-19 Vaccine (1) Never done    Potassium monitoring  03/23/2020    Creatinine monitoring  03/23/2020    Colon cancer screen colonoscopy  Never done    Flu vaccine (1) 09/01/2021    Lipid screen  03/24/2024    DTaP/Tdap/Td vaccine (2 - Td or Tdap) 05/02/2026    Hepatitis C screen  Completed    HIV screen  Completed    Hepatitis A vaccine  Aged Out    Hepatitis B vaccine  Aged Out    Hib vaccine  Aged Out    Meningococcal (ACWY) vaccine  Aged Out

## 2021-12-07 NOTE — PROGRESS NOTES
Urology, Francis    US DUPLEX PENILE COMPLETE    . I agree with orders as documented by the resident. More than 25 minutes spent  in face to face encounter with the patient and more than half in counseling. Patient's questions were answered. Patient Voiced understanding to the counseling. Return in about 3 months (around 3/7/2022) for follow up.    (GC Modifier)-Dr. Alexi Dunlap MD
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
## 2021-12-08 RX ORDER — OMEPRAZOLE 20 MG/1
CAPSULE, DELAYED RELEASE ORAL
Qty: 30 CAPSULE | Refills: 1 | Status: SHIPPED | OUTPATIENT
Start: 2021-12-08 | End: 2022-02-21 | Stop reason: SDUPTHER

## 2021-12-08 NOTE — TELEPHONE ENCOUNTER
Escribe Request for pending medication. Last Visit Date: 12/7/21  Next Visit Date:  No future appointments. Health Maintenance   Topic Date Due    Pneumococcal 0-64 years Vaccine (1 of 2 - PPSV23) Never done    COVID-19 Vaccine (1) Never done    Potassium monitoring  03/23/2020    Creatinine monitoring  03/23/2020    Colon cancer screen colonoscopy  Never done    Flu vaccine (1) 09/01/2021    Lipid screen  03/24/2024    DTaP/Tdap/Td vaccine (2 - Td or Tdap) 05/02/2026    Hepatitis C screen  Completed    HIV screen  Completed    Hepatitis A vaccine  Aged Out    Hepatitis B vaccine  Aged Out    Hib vaccine  Aged Out    Meningococcal (ACWY) vaccine  Aged Out       Hemoglobin A1C (%)   Date Value   03/24/2019 5.6   01/23/2012 5.2             ( goal A1C is < 7)   No results found for: LABMICR  LDL Cholesterol (mg/dL)   Date Value   03/24/2019 131 (H)       (goal LDL is <100)   AST (U/L)   Date Value   03/23/2019 65 (H)     ALT (U/L)   Date Value   03/23/2019 30     BUN (mg/dL)   Date Value   03/23/2019 9     BP Readings from Last 3 Encounters:   12/07/21 (!) 160/101   10/27/20 124/77   08/15/19 135/88          (goal 120/80)    All Future Testing planned in [facility]  Lab Frequency Next Occurrence   Basic Metabolic Panel Once 67/45/8042   US DUPLEX PENILE COMPLETE Once 12/07/2021       Next Visit Date:  No future appointments.       Patient Active Problem List:     Bipolar 1 disorder, depressed, severe ([address]     Alcohol abuse     Major depressive disorder, recurrent episode, severe (HCC)     Olecranon bursitis of left elbow     Left arm cellulitis     Homicidal ideation     Severe manic bipolar I disorder without psychotic features ([address]     Closed fracture of fifth metacarpal bone of right hand     Polyarthralgia     Gastroesophageal reflux disease     Bipolar 1 disorder (HCC)     Benign essential HTN     Restless leg syndrome     Peyronie's disease     Penile lump

## 2022-02-10 DIAGNOSIS — G25.81 RESTLESS LEG SYNDROME: ICD-10-CM

## 2022-02-10 RX ORDER — ROPINIROLE 1 MG/1
TABLET, FILM COATED ORAL
Qty: 30 TABLET | Refills: 0 | Status: SHIPPED | OUTPATIENT
Start: 2022-02-10 | End: 2022-03-07

## 2022-02-10 NOTE — TELEPHONE ENCOUNTER
Please address the medication refill and close the encounter. If I can be of assistance, please route to the applicable pool. Thank you. Last visit: 12/7/2021  Last Med refill: 10/21/21  Does patient have enough medication for 72 hours: No:     Next Visit Date:  No future appointments.     Health Maintenance   Topic Date Due    COVID-19 Vaccine (1) Never done    Pneumococcal 0-64 years Vaccine (1 of 2 - PPSV23) Never done    Depression Monitoring  Never done    Potassium monitoring  03/23/2020    Creatinine monitoring  03/23/2020    Colon cancer screen colonoscopy  Never done    Flu vaccine (1) 09/01/2021    Lipid screen  03/24/2024    DTaP/Tdap/Td vaccine (2 - Td or Tdap) 05/02/2026    Hepatitis C screen  Completed    HIV screen  Completed    Hepatitis A vaccine  Aged Out    Hepatitis B vaccine  Aged Out    Hib vaccine  Aged Out    Meningococcal (ACWY) vaccine  Aged Out       Hemoglobin A1C (%)   Date Value   03/24/2019 5.6   01/23/2012 5.2             ( goal A1C is < 7)   No results found for: LABMICR  LDL Cholesterol (mg/dL)   Date Value   03/24/2019 131 (H)   04/10/2017 115       (goal LDL is <100)   AST (U/L)   Date Value   03/23/2019 65 (H)     ALT (U/L)   Date Value   03/23/2019 30     BUN (mg/dL)   Date Value   03/23/2019 9     BP Readings from Last 3 Encounters:   12/07/21 (!) 160/101   10/27/20 124/77   08/15/19 135/88          (goal 120/80)    All Future Testing planned in [facility]  Lab Frequency Next Occurrence   Basic Metabolic Panel Once 60/67/4950   US DUPLEX PENILE COMPLETE Once 12/07/2022               Patient Active Problem List:     Bipolar 1 disorder, depressed, severe ([address]     Alcohol abuse     Major depressive disorder, recurrent episode, severe ([address]     Olecranon bursitis of left elbow     Left arm cellulitis     Homicidal ideation     Severe manic bipolar I disorder without psychotic features ([address]     Closed fracture of fifth metacarpal bone of right hand Polyarthralgia     Gastroesophageal reflux disease     Bipolar 1 disorder (HCC)     Benign essential HTN     Restless leg syndrome     Peyronie's disease     Penile lump

## 2022-02-21 DIAGNOSIS — K21.9 GASTROESOPHAGEAL REFLUX DISEASE: ICD-10-CM

## 2022-02-21 DIAGNOSIS — G25.81 RESTLESS LEG SYNDROME: ICD-10-CM

## 2022-02-21 RX ORDER — OMEPRAZOLE 20 MG/1
CAPSULE, DELAYED RELEASE ORAL
Qty: 60 CAPSULE | Refills: 1 | Status: SHIPPED | OUTPATIENT
Start: 2022-02-21 | End: 2022-06-28

## 2022-02-21 RX ORDER — ROPINIROLE 1 MG/1
TABLET, FILM COATED ORAL
Qty: 30 TABLET | Refills: 0 | OUTPATIENT
Start: 2022-02-21

## 2022-02-21 NOTE — TELEPHONE ENCOUNTER
Please address the medication refill and close the encounter. If I can be of assistance, please route to the applicable pool. Thank you. Last visit: 12/7/2021  Last Med refill: 1/14/22  Does patient have enough medication for 72 hours: No:     Next Visit Date:  No future appointments.     Health Maintenance   Topic Date Due    COVID-19 Vaccine (1) Never done    Pneumococcal 0-64 years Vaccine (1 of 2 - PPSV23) Never done    Depression Monitoring  Never done    Potassium monitoring  03/23/2020    Creatinine monitoring  03/23/2020    Colorectal Cancer Screen  Never done    Flu vaccine (1) 09/01/2021    Lipid screen  03/24/2024    DTaP/Tdap/Td vaccine (2 - Td or Tdap) 05/02/2026    Hepatitis C screen  Completed    HIV screen  Completed    Hepatitis A vaccine  Aged Out    Hepatitis B vaccine  Aged Out    Hib vaccine  Aged Out    Meningococcal (ACWY) vaccine  Aged Out       Hemoglobin A1C (%)   Date Value   03/24/2019 5.6   01/23/2012 5.2             ( goal A1C is < 7)   No results found for: LABMICR  LDL Cholesterol (mg/dL)   Date Value   03/24/2019 131 (H)   04/10/2017 115       (goal LDL is <100)   AST (U/L)   Date Value   03/23/2019 65 (H)     ALT (U/L)   Date Value   03/23/2019 30     BUN (mg/dL)   Date Value   03/23/2019 9     BP Readings from Last 3 Encounters:   12/07/21 (!) 160/101   10/27/20 124/77   08/15/19 135/88          (goal 120/80)    All Future Testing planned in [facility]  Lab Frequency Next Occurrence   Basic Metabolic Panel Once 18/54/5706   US DUPLEX PENILE COMPLETE Once 12/07/2022               Patient Active Problem List:     Bipolar 1 disorder, depressed, severe ([address]     Alcohol abuse     Major depressive disorder, recurrent episode, severe ([address]     Olecranon bursitis of left elbow     Left arm cellulitis     Homicidal ideation     Severe manic bipolar I disorder without psychotic features ([address]     Closed fracture of fifth metacarpal bone of right hand Polyarthralgia     Gastroesophageal reflux disease     Bipolar 1 disorder (HCC)     Benign essential HTN     Restless leg syndrome     Peyronie's disease     Penile lump

## 2022-03-07 DIAGNOSIS — G25.81 RESTLESS LEG SYNDROME: ICD-10-CM

## 2022-03-07 RX ORDER — ROPINIROLE 1 MG/1
TABLET, FILM COATED ORAL
Qty: 30 TABLET | Refills: 0 | Status: SHIPPED | OUTPATIENT
Start: 2022-03-07 | End: 2022-04-11 | Stop reason: SDUPTHER

## 2022-04-11 DIAGNOSIS — G25.81 RESTLESS LEG SYNDROME: ICD-10-CM

## 2022-04-11 RX ORDER — ROPINIROLE 1 MG/1
TABLET, FILM COATED ORAL
Qty: 30 TABLET | Refills: 0 | Status: SHIPPED | OUTPATIENT
Start: 2022-04-11 | End: 2022-04-27

## 2022-04-11 NOTE — TELEPHONE ENCOUNTER
Last visit: 12/7/2021  Last Med refill: 3/7/2022  Does patient have enough medication for 72 hours: No:     Next Visit Date:  No future appointments.     Health Maintenance   Topic Date Due    COVID-19 Vaccine (1) Never done    Pneumococcal 0-64 years Vaccine (1 of 2 - PPSV23) Never done    Depression Monitoring  Never done    Potassium monitoring  03/23/2020    Creatinine monitoring  03/23/2020    Colorectal Cancer Screen  Never done    Flu vaccine (Season Ended) 09/01/2022    Lipid screen  03/24/2024    DTaP/Tdap/Td vaccine (2 - Td or Tdap) 05/02/2026    Hepatitis C screen  Completed    HIV screen  Completed    Hepatitis A vaccine  Aged Out    Hepatitis B vaccine  Aged Out    Hib vaccine  Aged Out    Meningococcal (ACWY) vaccine  Aged Out       Hemoglobin A1C (%)   Date Value   03/24/2019 5.6   01/23/2012 5.2             ( goal A1C is < 7)   No results found for: LABMICR  LDL Cholesterol (mg/dL)   Date Value   03/24/2019 131 (H)   04/10/2017 115       (goal LDL is <100)   AST (U/L)   Date Value   03/23/2019 65 (H)     ALT (U/L)   Date Value   03/23/2019 30     BUN (mg/dL)   Date Value   03/23/2019 9     BP Readings from Last 3 Encounters:   12/07/21 (!) 160/101   10/27/20 124/77   08/15/19 135/88          (goal 120/80)    All Future Testing planned in [facility]  Lab Frequency Next Occurrence   Basic Metabolic Panel Once 12/23/3959   US DUPLEX PENILE COMPLETE Once 12/07/2022               Patient Active Problem List:     Bipolar 1 disorder, depressed, severe ([address]     Alcohol abuse     Major depressive disorder, recurrent episode, severe ([address]     Olecranon bursitis of left elbow     Left arm cellulitis     Homicidal ideation     Severe manic bipolar I disorder without psychotic features ([address]     Closed fracture of fifth metacarpal bone of right hand     Polyarthralgia     Gastroesophageal reflux disease     Bipolar 1 disorder ([address]     Benign essential HTN     Restless leg syndrome     Peyronie's disease     Penile lump

## 2022-04-27 DIAGNOSIS — G25.81 RESTLESS LEG SYNDROME: ICD-10-CM

## 2022-04-27 RX ORDER — ROPINIROLE 1 MG/1
TABLET, FILM COATED ORAL
Qty: 30 TABLET | Refills: 0 | Status: SHIPPED | OUTPATIENT
Start: 2022-04-27 | End: 2022-06-03

## 2022-04-27 NOTE — TELEPHONE ENCOUNTER
Last visit: 12/7/21  Last Med refill: 4/12/22  Does patient have enough medication for 72 hours: Yes    Next Visit Date:  No future appointments.     Health Maintenance   Topic Date Due    COVID-19 Vaccine (1) Never done    Pneumococcal 0-64 years Vaccine (1 - PCV) Never done    Depression Monitoring  Never done    Potassium  03/23/2020    Creatinine  03/23/2020    Colorectal Cancer Screen  Never done    Flu vaccine (Season Ended) 09/01/2022    Lipids  03/24/2024    DTaP/Tdap/Td vaccine (2 - Td or Tdap) 05/02/2026    Hepatitis C screen  Completed    HIV screen  Completed    Hepatitis A vaccine  Aged Out    Hepatitis B vaccine  Aged Out    Hib vaccine  Aged Out    Meningococcal (ACWY) vaccine  Aged Out       Hemoglobin A1C (%)   Date Value   03/24/2019 5.6   01/23/2012 5.2             ( goal A1C is < 7)   No results found for: LABMICR  LDL Cholesterol (mg/dL)   Date Value   03/24/2019 131 (H)   04/10/2017 115       (goal LDL is <100)   AST (U/L)   Date Value   03/23/2019 65 (H)     ALT (U/L)   Date Value   03/23/2019 30     BUN (mg/dL)   Date Value   03/23/2019 9     BP Readings from Last 3 Encounters:   12/07/21 (!) 160/101   10/27/20 124/77   08/15/19 135/88          (goal 120/80)    All Future Testing planned in [facility]  Lab Frequency Next Occurrence   Basic Metabolic Panel Once 14/79/6831   US DUPLEX PENILE COMPLETE Once 12/07/2022               Patient Active Problem List:     Bipolar 1 disorder, depressed, severe ([address]     Alcohol abuse     Major depressive disorder, recurrent episode, severe ([address]     Olecranon bursitis of left elbow     Left arm cellulitis     Homicidal ideation     Severe manic bipolar I disorder without psychotic features ([address]     Closed fracture of fifth metacarpal bone of right hand     Polyarthralgia     Gastroesophageal reflux disease     Bipolar 1 disorder ([address]     Benign essential HTN     Restless leg syndrome     Peyronie's disease     Penile lump

## 2022-06-02 DIAGNOSIS — G25.81 RESTLESS LEG SYNDROME: ICD-10-CM

## 2022-06-03 RX ORDER — ROPINIROLE 1 MG/1
TABLET, FILM COATED ORAL
Qty: 30 TABLET | Refills: 0 | Status: SHIPPED | OUTPATIENT
Start: 2022-06-03 | End: 2022-06-28 | Stop reason: SDUPTHER

## 2022-06-03 NOTE — TELEPHONE ENCOUNTER
E-scribe request for med refills. Please review and e-scribe if applicable.      Last Visit Date:  12/7/21  Next Visit Date:  Visit date not found    Hemoglobin A1C (%)   Date Value   03/24/2019 5.6   01/23/2012 5.2             ( goal A1C is < 7)   No results found for: LABMICR  LDL Cholesterol (mg/dL)   Date Value   03/24/2019 131 (H)       (goal LDL is <100)   AST (U/L)   Date Value   03/23/2019 65 (H)     ALT (U/L)   Date Value   03/23/2019 30     BUN (mg/dL)   Date Value   03/23/2019 9     BP Readings from Last 3 Encounters:   12/07/21 (!) 160/101   10/27/20 124/77   08/15/19 135/88          (goal 120/80)        Patient Active Problem List:     Bipolar 1 disorder, depressed, severe ([address]     Alcohol abuse     Major depressive disorder, recurrent episode, severe ([address]     Olecranon bursitis of left elbow     Left arm cellulitis     Homicidal ideation     Severe manic bipolar I disorder without psychotic features ([address]     Closed fracture of fifth metacarpal bone of right hand     Polyarthralgia     Gastroesophageal reflux disease     Bipolar 1 disorder (HCC)     Benign essential HTN     Restless leg syndrome     Peyronie's disease     Penile lump      ----Adolph Marshall

## 2022-06-27 DIAGNOSIS — G25.81 RESTLESS LEG SYNDROME: ICD-10-CM

## 2022-06-27 DIAGNOSIS — K21.9 GASTROESOPHAGEAL REFLUX DISEASE: ICD-10-CM

## 2022-06-28 RX ORDER — OMEPRAZOLE 20 MG/1
CAPSULE, DELAYED RELEASE ORAL
Qty: 60 CAPSULE | Refills: 1 | Status: SHIPPED | OUTPATIENT
Start: 2022-06-28 | End: 2022-10-03

## 2022-06-28 RX ORDER — ROPINIROLE 1 MG/1
TABLET, FILM COATED ORAL
Qty: 30 TABLET | Refills: 0 | OUTPATIENT
Start: 2022-06-28

## 2022-06-28 RX ORDER — ROPINIROLE 1 MG/1
TABLET, FILM COATED ORAL
Qty: 30 TABLET | Refills: 0 | Status: SHIPPED | OUTPATIENT
Start: 2022-06-28 | End: 2022-07-26 | Stop reason: SDUPTHER

## 2022-06-28 NOTE — TELEPHONE ENCOUNTER
E-scribe request for med refill. Please review and e-scribe if applicable.      Last Visit Date:  12/07/2021  Next Visit Date:  Visit date not found    Hemoglobin A1C (%)   Date Value   03/24/2019 5.6   01/23/2012 5.2             ( goal A1C is < 7)   No results found for: LABMICR  LDL Cholesterol (mg/dL)   Date Value   03/24/2019 131 (H)       (goal LDL is <100)   AST (U/L)   Date Value   03/23/2019 65 (H)     ALT (U/L)   Date Value   03/23/2019 30     BUN (mg/dL)   Date Value   03/23/2019 9     BP Readings from Last 3 Encounters:   12/07/21 (!) 160/101   10/27/20 124/77   08/15/19 135/88          (goal 120/80)        Patient Active Problem List:     Bipolar 1 disorder, depressed, severe ([address]     Alcohol abuse     Major depressive disorder, recurrent episode, severe ([address]     Olecranon bursitis of left elbow     Left arm cellulitis     Homicidal ideation     Severe manic bipolar I disorder without psychotic features ([address]     Closed fracture of fifth metacarpal bone of right hand     Polyarthralgia     Gastroesophageal reflux disease     Bipolar 1 disorder (HCC)     Benign essential HTN     Restless leg syndrome     Peyronie's disease     Penile lump      ----Evelyn Bartlett

## 2022-07-26 DIAGNOSIS — G25.81 RESTLESS LEG SYNDROME: ICD-10-CM

## 2022-07-26 RX ORDER — ROPINIROLE 1 MG/1
TABLET, FILM COATED ORAL
Qty: 30 TABLET | Refills: 0 | Status: SHIPPED | OUTPATIENT
Start: 2022-07-26 | End: 2022-08-19

## 2022-07-26 NOTE — TELEPHONE ENCOUNTER
Last visit: 12/7/2021  Last Med refill: 6/28/22  Does patient have enough medication for 72 hours: No:     Next Visit Date:  Future Appointments   Date Time Provider Eugenio Wiggins   8/8/2022  3:00 PM Akshat Yusuf MD [address] [place] [address] Maintenance   Topic Date Due    COVID-19 Vaccine (1) Never done    Pneumococcal 0-64 years Vaccine (1 - PCV) Never done    Depression Monitoring  Never done    Colorectal Cancer Screen  Never done    Flu vaccine (1) 09/01/2022    Lipids  03/24/2024    DTaP/Tdap/Td vaccine (2 - Td or Tdap) 05/02/2026    Hepatitis C screen  Completed    HIV screen  Completed    Hepatitis A vaccine  Aged Out    Hepatitis B vaccine  Aged Out    Hib vaccine  Aged Out    Meningococcal (ACWY) vaccine  Aged Out       Hemoglobin A1C (%)   Date Value   03/24/2019 5.6   01/23/2012 5.2             ( goal A1C is < 7)   No results found for: LABMICR  LDL Cholesterol (mg/dL)   Date Value   03/24/2019 131 (H)   04/10/2017 115       (goal LDL is <100)   AST (U/L)   Date Value   03/23/2019 65 (H)     ALT (U/L)   Date Value   03/23/2019 30     BUN (mg/dL)   Date Value   03/23/2019 9     BP Readings from Last 3 Encounters:   12/07/21 (!) 160/101   10/27/20 124/77   08/15/19 135/88          (goal 120/80)    All Future Testing planned in [facility]  Lab Frequency Next Occurrence   Basic Metabolic Panel Once 35/45/3751   US DUPLEX PENILE COMPLETE Once 12/07/2022               Patient Active Problem List:     Bipolar 1 disorder, depressed, severe ([address]     Alcohol abuse     Major depressive disorder, recurrent episode, severe ([address]     Olecranon bursitis of left elbow     Left arm cellulitis     Homicidal ideation     Severe manic bipolar I disorder without psychotic features ([address]     Closed fracture of fifth metacarpal bone of right hand     Polyarthralgia     Gastroesophageal reflux disease     Bipolar 1 disorder ([address]     Benign essential HTN     Restless leg syndrome     Peyronie's disease     Penile lump

## 2022-08-09 ENCOUNTER — TELEPHONE (OUTPATIENT)
Dept: FAMILY MEDICINE CLINIC | Age: 47
End: 2022-08-09

## 2022-08-18 DIAGNOSIS — G25.81 RESTLESS LEG SYNDROME: ICD-10-CM

## 2022-08-19 RX ORDER — ROPINIROLE 1 MG/1
TABLET, FILM COATED ORAL
Qty: 30 TABLET | Refills: 0 | Status: SHIPPED | OUTPATIENT
Start: 2022-08-19 | End: 2022-09-10 | Stop reason: SDUPTHER

## 2022-09-09 DIAGNOSIS — G25.81 RESTLESS LEG SYNDROME: ICD-10-CM

## 2022-09-09 NOTE — TELEPHONE ENCOUNTER
----- Message from Belkys Sykes sent at 9/9/2022  3:47 PM EDT -----  Subject: Refill Request    QUESTIONS  Name of Medication? rOPINIRole (REQUIP) 1 MG tablet  Patient-reported dosage and instructions? Once at night before bedtime   How many days do you have left? 0  Preferred Pharmacy? [facility] phone number (if available)? [phone]  ---------------------------------------------------------------------------  --------------  CALL BACK INFO  What is the best way for the office to contact you? OK to leave message on   voicemail  Preferred Call Back Phone Number? [phone]  ---------------------------------------------------------------------------  --------------  SCRIPT ANSWERS  Relationship to Patient?  Self

## 2022-09-10 RX ORDER — ROPINIROLE 1 MG/1
TABLET, FILM COATED ORAL
Qty: 30 TABLET | Refills: 0 | Status: SHIPPED | OUTPATIENT
Start: 2022-09-10 | End: 2022-09-29 | Stop reason: SDUPTHER

## 2022-09-12 DIAGNOSIS — G25.81 RESTLESS LEG SYNDROME: ICD-10-CM

## 2022-09-12 RX ORDER — ROPINIROLE 1 MG/1
TABLET, FILM COATED ORAL
Qty: 30 TABLET | Refills: 0 | OUTPATIENT
Start: 2022-09-12

## 2022-09-29 ENCOUNTER — TELEPHONE (OUTPATIENT)
Dept: FAMILY MEDICINE CLINIC | Age: 47
End: 2022-09-29

## 2022-09-29 ENCOUNTER — OFFICE VISIT (OUTPATIENT)
Dept: FAMILY MEDICINE CLINIC | Age: 47
End: 2022-09-29
Payer: MEDICARE

## 2022-09-29 VITALS
WEIGHT: 201 LBS | SYSTOLIC BLOOD PRESSURE: 133 MMHG | HEART RATE: 98 BPM | BODY MASS INDEX: 28.14 KG/M2 | DIASTOLIC BLOOD PRESSURE: 82 MMHG | HEIGHT: 71 IN | TEMPERATURE: 97.7 F

## 2022-09-29 DIAGNOSIS — G25.81 RESTLESS LEG SYNDROME: Primary | ICD-10-CM

## 2022-09-29 DIAGNOSIS — M54.50 CHRONIC LOW BACK PAIN WITHOUT SCIATICA, UNSPECIFIED BACK PAIN LATERALITY: ICD-10-CM

## 2022-09-29 DIAGNOSIS — R03.0 ELEVATED BLOOD PRESSURE READING: ICD-10-CM

## 2022-09-29 DIAGNOSIS — G89.29 CHRONIC LOW BACK PAIN WITHOUT SCIATICA, UNSPECIFIED BACK PAIN LATERALITY: ICD-10-CM

## 2022-09-29 PROCEDURE — 99213 OFFICE O/P EST LOW 20 MIN: CPT | Performed by: STUDENT IN AN ORGANIZED HEALTH CARE EDUCATION/TRAINING PROGRAM

## 2022-09-29 PROCEDURE — G8419 CALC BMI OUT NRM PARAM NOF/U: HCPCS | Performed by: STUDENT IN AN ORGANIZED HEALTH CARE EDUCATION/TRAINING PROGRAM

## 2022-09-29 PROCEDURE — G8427 DOCREV CUR MEDS BY ELIG CLIN: HCPCS | Performed by: STUDENT IN AN ORGANIZED HEALTH CARE EDUCATION/TRAINING PROGRAM

## 2022-09-29 PROCEDURE — 4004F PT TOBACCO SCREEN RCVD TLK: CPT | Performed by: STUDENT IN AN ORGANIZED HEALTH CARE EDUCATION/TRAINING PROGRAM

## 2022-09-29 RX ORDER — ROPINIROLE 1 MG/1
2 TABLET, FILM COATED ORAL NIGHTLY
Qty: 60 TABLET | Refills: 1 | Status: SHIPPED | OUTPATIENT
Start: 2022-09-29 | End: 2022-10-24 | Stop reason: SDUPTHER

## 2022-09-29 RX ORDER — CYCLOBENZAPRINE HCL 10 MG
10 TABLET ORAL 3 TIMES DAILY
Qty: 30 TABLET | Refills: 0 | Status: CANCELLED | OUTPATIENT
Start: 2022-09-29

## 2022-09-29 RX ORDER — ROPINIROLE 1 MG/1
2 TABLET, FILM COATED ORAL NIGHTLY
Qty: 30 TABLET | Refills: 1 | Status: SHIPPED | OUTPATIENT
Start: 2022-09-29 | End: 2022-09-29

## 2022-09-29 RX ORDER — CYCLOBENZAPRINE HCL 10 MG
5 TABLET ORAL 3 TIMES DAILY
Qty: 21 TABLET | Refills: 0 | Status: SHIPPED | OUTPATIENT
Start: 2022-09-29 | End: 2022-09-29 | Stop reason: DRUGHIGH

## 2022-09-29 RX ORDER — CYCLOBENZAPRINE HCL 5 MG
5 TABLET ORAL 2 TIMES DAILY PRN
Qty: 30 TABLET | Refills: 0 | Status: SHIPPED | OUTPATIENT
Start: 2022-09-29 | End: 2022-10-09

## 2022-09-29 SDOH — ECONOMIC STABILITY: FOOD INSECURITY: WITHIN THE PAST 12 MONTHS, YOU WORRIED THAT YOUR FOOD WOULD RUN OUT BEFORE YOU GOT MONEY TO BUY MORE.: NEVER TRUE

## 2022-09-29 SDOH — ECONOMIC STABILITY: FOOD INSECURITY: WITHIN THE PAST 12 MONTHS, THE FOOD YOU BOUGHT JUST DIDN'T LAST AND YOU DIDN'T HAVE MONEY TO GET MORE.: NEVER TRUE

## 2022-09-29 ASSESSMENT — ENCOUNTER SYMPTOMS
SHORTNESS OF BREATH: 0
NAUSEA: 0
COUGH: 0
BACK PAIN: 1
VOMITING: 0
DIARRHEA: 0
COLOR CHANGE: 0
CHEST TIGHTNESS: 0
CONSTIPATION: 0
ABDOMINAL PAIN: 0

## 2022-09-29 ASSESSMENT — PATIENT HEALTH QUESTIONNAIRE - PHQ9
4. FEELING TIRED OR HAVING LITTLE ENERGY: 0
6. FEELING BAD ABOUT YOURSELF - OR THAT YOU ARE A FAILURE OR HAVE LET YOURSELF OR YOUR FAMILY DOWN: 0
10. IF YOU CHECKED OFF ANY PROBLEMS, HOW DIFFICULT HAVE THESE PROBLEMS MADE IT FOR YOU TO DO YOUR WORK, TAKE CARE OF THINGS AT HOME, OR GET ALONG WITH OTHER PEOPLE: 0
2. FEELING DOWN, DEPRESSED OR HOPELESS: 0
7. TROUBLE CONCENTRATING ON THINGS, SUCH AS READING THE NEWSPAPER OR WATCHING TELEVISION: 0
SUM OF ALL RESPONSES TO PHQ QUESTIONS 1-9: 0
3. TROUBLE FALLING OR STAYING ASLEEP: 0
1. LITTLE INTEREST OR PLEASURE IN DOING THINGS: 0
SUM OF ALL RESPONSES TO PHQ9 QUESTIONS 1 & 2: 0
SUM OF ALL RESPONSES TO PHQ QUESTIONS 1-9: 0
9. THOUGHTS THAT YOU WOULD BE BETTER OFF DEAD, OR OF HURTING YOURSELF: 0
SUM OF ALL RESPONSES TO PHQ QUESTIONS 1-9: 0
SUM OF ALL RESPONSES TO PHQ QUESTIONS 1-9: 0
5. POOR APPETITE OR OVEREATING: 0
8. MOVING OR SPEAKING SO SLOWLY THAT OTHER PEOPLE COULD HAVE NOTICED. OR THE OPPOSITE, BEING SO FIGETY OR RESTLESS THAT YOU HAVE BEEN MOVING AROUND A LOT MORE THAN USUAL: 0

## 2022-09-29 ASSESSMENT — SOCIAL DETERMINANTS OF HEALTH (SDOH): HOW HARD IS IT FOR YOU TO PAY FOR THE VERY BASICS LIKE FOOD, HOUSING, MEDICAL CARE, AND HEATING?: NOT HARD AT ALL

## 2022-09-29 NOTE — PATIENT INSTRUCTIONS
Thank you for letting us take care of you today. We hope all your questions were addressed. If a question was overlooked or something else comes to mind after you return home, please contact a member of your Care Team listed below. Your Care Team at [place] [id number] is Team #1  Josafat Owusu MD (Faculty)  Nicanor Sidhu MD(Resident)  Steven Jerome MD (Resident)  Hamzah Nguyen DO (Resident)  RORY Downey Junior., RORY Koch., RICK [place]., Cate Gilman., [facility] office)  Zhane White, [address] (Clinical Practice Manager)  Maedleine Cintron [facility] (Clinical Pharmacist)     Office phone number: [phone]    If you need to get in right away due to illness, please be advised we have \"Same Day\" appointments available Monday-Friday. Please call us at [phone] option #3 to schedule your \"Same Day\" appointment.
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
## 2022-09-29 NOTE — TELEPHONE ENCOUNTER
Patient 's pharmacy called to inform the provider that patient's flexeril was filled incorrectly it should have been filled as 0.5 mg 3 times daily / patient's [place] Y[employer]iligan was also filled incorrectly .  Provider has been informed and she will take care of this / spoke with pharmacy and they have been informed that provider will sent in correct medication fills

## 2022-09-29 NOTE — PROGRESS NOTES
HYPERTENSION visit     BP Readings from Last 3 Encounters:   12/07/21 (!) 160/101   10/27/20 124/77   08/15/19 135/88       LDL Cholesterol (mg/dL)   Date Value   03/24/2019 131 (H)     HDL (mg/dL)   Date Value   03/24/2019 90     BUN (mg/dL)   Date Value   03/23/2019 9     Creatinine (mg/dL)   Date Value   03/23/2019 0.67 (L)     Glucose (mg/dL)   Date Value   03/23/2019 95   01/25/2012 89              Have you changed or started any medications since your last visit including any over-the-counter medicines, vitamins, or herbal medicines? no   Have you stopped taking any of your medications? Is so, why? -  no  Are you having any side effects from any of your medications? - no  How often do you miss doses of your medication? rare      Have you seen any other physician or provider since your last visit?  no   Have you had any other diagnostic tests since your last visit?  no   Have you been seen in the emergency room and/or had an admission in a hospital since we last saw you?  no   Have you had your routine dental cleaning in the past 6 months?  no     Do you have an active MyChart account? If no, what is the barrier?   No: inactive    Patient Care Team:  Kathleen Broussard MD as PCP - General (Family Medicine)    Medical History Review  Past Medical, Family, and Social History reviewed and does not contribute to the patient presenting condition    Health Maintenance   Topic Date Due    COVID-19 Vaccine (1) Never done    Hepatitis A vaccine (1 of 2 - Risk 2-dose series) Never done    Depression Monitoring  Never done    Hepatitis B vaccine (1 of 3 - Risk 3-dose series) Never done    Colorectal Cancer Screen  Never done    Diabetes screen  03/24/2022    Flu vaccine (1) 08/01/2022    Lipids  03/24/2024    DTaP/Tdap/Td vaccine (2 - Td or Tdap) 05/02/2026    Hepatitis C screen  Completed    HIV screen  Completed    Hib vaccine  Aged Out    Meningococcal (ACWY) vaccine  Aged Out    Pneumococcal 0-64 years Vaccine  Aged Out
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
## 2022-10-01 DIAGNOSIS — K21.9 GASTROESOPHAGEAL REFLUX DISEASE: ICD-10-CM

## 2022-10-03 RX ORDER — OMEPRAZOLE 20 MG/1
CAPSULE, DELAYED RELEASE ORAL
Qty: 30 CAPSULE | Refills: 10 | Status: SHIPPED | OUTPATIENT
Start: 2022-10-03 | End: 2022-10-24 | Stop reason: SDUPTHER

## 2022-10-03 NOTE — TELEPHONE ENCOUNTER
Last visit: 9/29/22  Last Med refill: 7/28/22  Does patient have enough medication for 72 hours: No:     Next Visit Date:  Future Appointments   Date Time Provider Eugenio Wiggins   10/24/2022  2:00 PM Nathaniel Barnhart MD [address] [place] [address] Maintenance   Topic Date Due    COVID-19 Vaccine (1) Never done    Hepatitis A vaccine (1 of 2 - Risk 2-dose series) Never done    Hepatitis B vaccine (1 of 3 - Risk 3-dose series) Never done    Colorectal Cancer Screen  Never done    Diabetes screen  03/24/2022    Flu vaccine (1) 08/01/2022    Depression Monitoring  09/29/2023    Lipids  03/24/2024    DTaP/Tdap/Td vaccine (2 - Td or Tdap) 05/02/2026    Hepatitis C screen  Completed    HIV screen  Completed    Hib vaccine  Aged Out    Meningococcal (ACWY) vaccine  Aged Out    Pneumococcal 0-64 years Vaccine  Aged Out       Hemoglobin A1C (%)   Date Value   03/24/2019 5.6   01/23/2012 5.2             ( goal A1C is < 7)   No results found for: LABMICR  LDL Cholesterol (mg/dL)   Date Value   03/24/2019 131 (H)   04/10/2017 115       (goal LDL is <100)   AST (U/L)   Date Value   03/23/2019 65 (H)     ALT (U/L)   Date Value   03/23/2019 30     BUN (mg/dL)   Date Value   03/23/2019 9     BP Readings from Last 3 Encounters:   09/29/22 133/82   12/07/21 (!) 160/101   10/27/20 124/77          (goal 120/80)    All Future Testing planned in [facility]  Lab Frequency Next Occurrence   Basic Metabolic Panel Once 09/79/1259   US DUPLEX PENILE COMPLETE Once 12/07/2022               Patient Active Problem List:     Bipolar 1 disorder, depressed, severe ([address]     Alcohol abuse     Major depressive disorder, recurrent episode, severe ([address]     Olecranon bursitis of left elbow     Left arm cellulitis     Homicidal ideation     Severe manic bipolar I disorder without psychotic features ([address]     Closed fracture of fifth metacarpal bone of right hand     Polyarthralgia     Gastroesophageal reflux disease     Bipolar 1 disorder ([address] Essential hypertension     Restless leg syndrome     Peyronie's disease     Penile lump     Chronic low back pain without sciatica     Elevated blood pressure reading

## 2022-10-24 ENCOUNTER — OFFICE VISIT (OUTPATIENT)
Dept: FAMILY MEDICINE CLINIC | Age: 47
End: 2022-10-24
Payer: MEDICARE

## 2022-10-24 VITALS
BODY MASS INDEX: 26.99 KG/M2 | HEIGHT: 71 IN | OXYGEN SATURATION: 97 % | HEART RATE: 74 BPM | SYSTOLIC BLOOD PRESSURE: 114 MMHG | DIASTOLIC BLOOD PRESSURE: 80 MMHG | WEIGHT: 192.8 LBS

## 2022-10-24 DIAGNOSIS — F33.0 MILD EPISODE OF RECURRENT MAJOR DEPRESSIVE DISORDER (HCC): Primary | ICD-10-CM

## 2022-10-24 DIAGNOSIS — K21.9 GASTROESOPHAGEAL REFLUX DISEASE WITHOUT ESOPHAGITIS: ICD-10-CM

## 2022-10-24 DIAGNOSIS — G25.81 RESTLESS LEG SYNDROME: ICD-10-CM

## 2022-10-24 PROCEDURE — 99213 OFFICE O/P EST LOW 20 MIN: CPT | Performed by: STUDENT IN AN ORGANIZED HEALTH CARE EDUCATION/TRAINING PROGRAM

## 2022-10-24 PROCEDURE — 4004F PT TOBACCO SCREEN RCVD TLK: CPT | Performed by: STUDENT IN AN ORGANIZED HEALTH CARE EDUCATION/TRAINING PROGRAM

## 2022-10-24 PROCEDURE — G8484 FLU IMMUNIZE NO ADMIN: HCPCS | Performed by: STUDENT IN AN ORGANIZED HEALTH CARE EDUCATION/TRAINING PROGRAM

## 2022-10-24 PROCEDURE — G8419 CALC BMI OUT NRM PARAM NOF/U: HCPCS | Performed by: STUDENT IN AN ORGANIZED HEALTH CARE EDUCATION/TRAINING PROGRAM

## 2022-10-24 PROCEDURE — G8427 DOCREV CUR MEDS BY ELIG CLIN: HCPCS | Performed by: STUDENT IN AN ORGANIZED HEALTH CARE EDUCATION/TRAINING PROGRAM

## 2022-10-24 RX ORDER — ROPINIROLE 1 MG/1
2 TABLET, FILM COATED ORAL NIGHTLY
Qty: 60 TABLET | Refills: 1 | Status: SHIPPED | OUTPATIENT
Start: 2022-10-24

## 2022-10-24 RX ORDER — OMEPRAZOLE 20 MG/1
CAPSULE, DELAYED RELEASE ORAL
Qty: 30 CAPSULE | Refills: 10 | Status: SHIPPED | OUTPATIENT
Start: 2022-10-24

## 2022-10-24 RX ORDER — FLUOXETINE 10 MG/1
10 CAPSULE ORAL DAILY
Qty: 30 CAPSULE | Refills: 3 | Status: SHIPPED | OUTPATIENT
Start: 2022-10-24

## 2022-10-24 ASSESSMENT — ENCOUNTER SYMPTOMS
COLOR CHANGE: 0
DIARRHEA: 0
CHEST TIGHTNESS: 0
BACK PAIN: 0
NAUSEA: 0
CONSTIPATION: 0
COUGH: 0
VOMITING: 0
ABDOMINAL PAIN: 0
SHORTNESS OF BREATH: 0

## 2022-10-24 ASSESSMENT — PATIENT HEALTH QUESTIONNAIRE - PHQ9
SUM OF ALL RESPONSES TO PHQ9 QUESTIONS 1 & 2: 5
SUM OF ALL RESPONSES TO PHQ QUESTIONS 1-9: 11
7. TROUBLE CONCENTRATING ON THINGS, SUCH AS READING THE NEWSPAPER OR WATCHING TELEVISION: 2
4. FEELING TIRED OR HAVING LITTLE ENERGY: 1
5. POOR APPETITE OR OVEREATING: 0
6. FEELING BAD ABOUT YOURSELF - OR THAT YOU ARE A FAILURE OR HAVE LET YOURSELF OR YOUR FAMILY DOWN: 2
2. FEELING DOWN, DEPRESSED OR HOPELESS: 3
1. LITTLE INTEREST OR PLEASURE IN DOING THINGS: 2
SUM OF ALL RESPONSES TO PHQ QUESTIONS 1-9: 12
9. THOUGHTS THAT YOU WOULD BE BETTER OFF DEAD, OR OF HURTING YOURSELF: 1
3. TROUBLE FALLING OR STAYING ASLEEP: 0
8. MOVING OR SPEAKING SO SLOWLY THAT OTHER PEOPLE COULD HAVE NOTICED. OR THE OPPOSITE, BEING SO FIGETY OR RESTLESS THAT YOU HAVE BEEN MOVING AROUND A LOT MORE THAN USUAL: 1
SUM OF ALL RESPONSES TO PHQ QUESTIONS 1-9: 12
SUM OF ALL RESPONSES TO PHQ QUESTIONS 1-9: 12

## 2022-10-24 ASSESSMENT — COLUMBIA-SUICIDE SEVERITY RATING SCALE - C-SSRS
2. HAVE YOU ACTUALLY HAD ANY THOUGHTS OF KILLING YOURSELF?: NO
1. WITHIN THE PAST MONTH, HAVE YOU WISHED YOU WERE DEAD OR WISHED YOU COULD GO TO SLEEP AND NOT WAKE UP?: YES
6. HAVE YOU EVER DONE ANYTHING, STARTED TO DO ANYTHING, OR PREPARED TO DO ANYTHING TO END YOUR LIFE?: NO

## 2022-10-24 NOTE — PROGRESS NOTES
HYPERTENSION visit     BP Readings from Last 3 Encounters:   09/29/22 133/82   12/07/21 (!) 160/101   10/27/20 124/77       LDL Cholesterol (mg/dL)   Date Value   03/24/2019 131 (H)     HDL (mg/dL)   Date Value   03/24/2019 90     BUN (mg/dL)   Date Value   03/23/2019 9     Creatinine (mg/dL)   Date Value   03/23/2019 0.67 (L)     Glucose (mg/dL)   Date Value   03/23/2019 95   01/25/2012 89              Have you changed or started any medications since your last visit including any over-the-counter medicines, vitamins, or herbal medicines? no   Have you stopped taking any of your medications? Is so, why? -  no  Are you having any side effects from any of your medications? - no  How often do you miss doses of your medication? no      Have you seen any other physician or provider since your last visit?  no   Have you had any other diagnostic tests since your last visit?  no   Have you been seen in the emergency room and/or had an admission in a hospital since we last saw you?  no   Have you had your routine dental cleaning in the past 6 months?  no     Do you have an active MyChart account? If no, what is the barrier?   No: Pending    Patient Care Team:  Mika Segundo MD as PCP - General (Family Medicine)    Medical History Review  Past Medical, Family, and Social History reviewed and does not contribute to the patient presenting condition    Health Maintenance   Topic Date Due    COVID-19 Vaccine (1) Never done    Hepatitis A vaccine (1 of 2 - Risk 2-dose series) Never done    Hepatitis B vaccine (1 of 3 - Risk 3-dose series) Never done    Colorectal Cancer Screen  Never done    Diabetes screen  03/24/2022    Flu vaccine (1) 08/01/2022    Depression Monitoring  09/29/2023    Lipids  03/24/2024    DTaP/Tdap/Td vaccine (2 - Td or Tdap) 05/02/2026    Hepatitis C screen  Completed    HIV screen  Completed    Hib vaccine  Aged Out    Meningococcal (ACWY) vaccine  Aged Out    Pneumococcal 0-64 years Vaccine  Aged Out
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
## 2022-10-24 NOTE — PROGRESS NOTES
Attending Physician Statement  I have discussed the care of [facility], 56 y.o. male,including pertinent history and exam findings,  with the resident Dr. Uma Buckley MD.  History:  Chief Complaint   Patient presents with    Hypertension     Follow up / patient would like referral for eye dr      I have reviewed the key elements of the encounter with the resident. Examination was done by resident as documented in residents note. BP Readings from Last 3 Encounters:   10/24/22 114/80   09/29/22 133/82   12/07/21 (!) 160/101     /80 (Site: Left Upper Arm, Position: Sitting, Cuff Size: Large Adult) Comment: machine  Pulse 74   Ht 5' 10.98\" (1.803 m)   Wt 192 lb 12.8 oz (87.5 kg)   SpO2 97%   BMI 26.90 kg/m²   Lab Results   Component Value Date    WBC 6.4 03/23/2019    HGB 14.8 03/23/2019    HCT 44.3 03/23/2019     03/23/2019    CHOL 235 (H) 03/24/2019    TRIG 71 03/24/2019    HDL 90 03/24/2019    ALT 30 03/23/2019    AST 65 (H) 03/23/2019     03/23/2019    K 4.1 03/23/2019     03/23/2019    CREATININE 0.67 (L) 03/23/2019    BUN 9 03/23/2019    CO2 22 03/23/2019    TSH 1.83 03/24/2019    INR 1.0 02/10/2019    LABA1C 5.6 03/24/2019     Lab Results   Component Value Date    CALCIUM 9.1 03/23/2019    PHOS 3.2 01/23/2012     Lab Results   Component Value Date    LDLCHOLESTEROL 131 (H) 03/24/2019     I agree with the assessment, plan and diagnosis of    Diagnosis Orders   1. Mild episode of recurrent major depressive disorder (HCC)  FLUoxetine (PROZAC) 10 MG capsule      2. Restless leg syndrome  rOPINIRole (REQUIP) 1 MG tablet      3. Gastroesophageal reflux disease  omeprazole (PRILOSEC) 20 MG delayed release capsule        I agree with  orders as documented by the resident. Recommendations: Will further assess for any signs of augmentation at next visit and obtain labs such as Iron panel or A1C at this time. Return in about 4 weeks (around 11/21/2022) for follow up, depression. ([employer] ) Dr. David Stack MD

## 2022-10-24 NOTE — PATIENT INSTRUCTIONS
Thank you for letting us take care of you today. We hope all your questions were addressed. If a question was overlooked or something else comes to mind after you return home, please contact a member of your Care Team listed below. Your Care Team at [place] [address] is Team #2  Rahul Genao DO (Faculty)  Ada Sorto (Faculty)  Leigh Dozier MD (Resident)  Karma Sotelo MD (Resident)  Elise Montilla MD (Resident)  Miguel A Rider MD (Resident)  Fareed Phillips., NICOLE Rondon.,  KISHORE Richards., RICK Francis., Caryle Cree [facility] office)  Darrius Paniagua, [address] [employer] (Clinical Practice Manager)  Allie Ghosh [facility] (Clinical Pharmacist)     Office phone number: [phone]    If you need to get in right away due to illness, please be advised we have \"Same Day\" appointments available Monday-Friday. Please call us at [phone] option #3 to schedule your \"Same Day\" appointment. Thank you for letting us take care of you today. We hope all your questions were addressed. If a question was overlooked or something else comes to mind after you return home, please contact a member of your Care Team listed below. Your Care Team at [place] [address] is Team #1  Nalini Lo MD (Faculty)  Agustin Velazquez MD(Resident)  Verenice Hardy MD (Resident)  Akilah Gonzales DO (Resident)  RORY Izquierdo., RORY Richards., RICK Hilliard., Yu Lowery., Caryle Cree [facility] office)  Darrius Paniagua, [address] [employer] (Clinical Practice Manager)  Allie Ghosh [facility] (Clinical Pharmacist)     Office phone number: [phone]    If you need to get in right away due to illness, please be advised we have \"Same Day\" appointments available Monday-Friday. Please call us at [phone] option #3 to schedule your \"Same Day\" appointment.

## 2023-01-09 DIAGNOSIS — G25.81 RESTLESS LEG SYNDROME: ICD-10-CM

## 2023-01-09 RX ORDER — ROPINIROLE 1 MG/1
TABLET, FILM COATED ORAL
Qty: 60 TABLET | Refills: 2 | Status: SHIPPED | OUTPATIENT
Start: 2023-01-09

## 2023-01-09 NOTE — TELEPHONE ENCOUNTER
E-scribe request for med refill. Please review and e-scribe if applicable.      Last Visit Date:  10/24/2022  Next Visit Date:  Visit date not found    Hemoglobin A1C (%)   Date Value   03/24/2019 5.6   01/23/2012 5.2             ( goal A1C is < 7)   No results found for: LABMICR  LDL Cholesterol (mg/dL)   Date Value   03/24/2019 131 (H)       (goal LDL is <100)   AST (U/L)   Date Value   03/23/2019 65 (H)     ALT (U/L)   Date Value   03/23/2019 30     BUN (mg/dL)   Date Value   03/23/2019 9     BP Readings from Last 3 Encounters:   10/24/22 114/80   09/29/22 133/82   12/07/21 (!) 160/101          (goal 120/80)        Patient Active Problem List:     Bipolar 1 disorder, depressed, severe ([address]     Alcohol abuse     Major depressive disorder, recurrent episode, severe ([address]     Olecranon bursitis of left elbow     Left arm cellulitis     Homicidal ideation     Severe manic bipolar I disorder without psychotic features ([address]     Closed fracture of fifth metacarpal bone of right hand     Polyarthralgia     Gastroesophageal reflux disease     Bipolar 1 disorder ([address]     Essential hypertension     Restless leg syndrome     Peyronie's disease     Penile lump     Chronic low back pain without sciatica     Elevated blood pressure reading     Mild episode of recurrent major depressive disorder ([address]      ----Raphael Reece

## 2023-01-16 DIAGNOSIS — F33.0 MILD EPISODE OF RECURRENT MAJOR DEPRESSIVE DISORDER (HCC): ICD-10-CM

## 2023-01-16 RX ORDER — FLUOXETINE 10 MG/1
10 CAPSULE ORAL DAILY
Qty: 30 CAPSULE | Refills: 5 | Status: SHIPPED | OUTPATIENT
Start: 2023-01-16

## 2023-01-16 NOTE — TELEPHONE ENCOUNTER
Prozac pending refill        Health Maintenance   Topic Date Due    COVID-19 Vaccine (1) Never done    Hepatitis A vaccine (1 of 2 - Risk 2-dose series) Never done    Hepatitis B vaccine (1 of 3 - Risk 3-dose series) Never done    Colorectal Cancer Screen  Never done    Diabetes screen  03/24/2022    Flu vaccine (1) 08/01/2022    Depression Monitoring  10/24/2023    Lipids  03/24/2024    DTaP/Tdap/Td vaccine (2 - Td or Tdap) 05/02/2026    Hepatitis C screen  Completed    HIV screen  Completed    Hib vaccine  Aged Out    Meningococcal (ACWY) vaccine  Aged Out    Pneumococcal 0-64 years Vaccine  Aged Out             (applicable per patient's age: Cancer Screenings, Depression Screening, Fall Risk Screening, Immunizations)    Hemoglobin A1C (%)   Date Value   03/24/2019 5.6   01/23/2012 5.2     LDL Cholesterol (mg/dL)   Date Value   03/24/2019 131 (H)     AST (U/L)   Date Value   03/23/2019 65 (H)     ALT (U/L)   Date Value   03/23/2019 30     BUN (mg/dL)   Date Value   03/23/2019 9      (goal A1C is < 7)   (goal LDL is <100) need 30-50% reduction from baseline     BP Readings from Last 3 Encounters:   10/24/22 114/80   09/29/22 133/82   12/07/21 (!) 160/101    (goal /80)      All Future Testing planned in [facility]:  Lab Frequency Next Occurrence       Next Visit Date:  No future appointments.          Patient Active Problem List:     Bipolar 1 disorder, depressed, severe ([address]     Alcohol abuse     Major depressive disorder, recurrent episode, severe (HCC)     Olecranon bursitis of left elbow     Left arm cellulitis     Homicidal ideation     Severe manic bipolar I disorder without psychotic features ([address]     Closed fracture of fifth metacarpal bone of right hand     Polyarthralgia     Gastroesophageal reflux disease     Bipolar 1 disorder ([address]     Essential hypertension     Restless leg syndrome     Peyronie's disease     Penile lump     Chronic low back pain without sciatica     Elevated blood pressure reading     Mild episode of recurrent major depressive disorder [facility]

## 2023-03-28 DIAGNOSIS — G25.81 RESTLESS LEG SYNDROME: ICD-10-CM

## 2023-03-29 RX ORDER — ROPINIROLE 1 MG/1
TABLET, FILM COATED ORAL
Qty: 60 TABLET | Refills: 1 | Status: SHIPPED | OUTPATIENT
Start: 2023-03-29

## 2023-05-05 ENCOUNTER — HOSPITAL ENCOUNTER (OUTPATIENT)
Age: 48
Setting detail: SPECIMEN
Discharge: HOME | End: 2023-05-05

## 2023-05-05 ENCOUNTER — OFFICE VISIT (OUTPATIENT)
Dept: FAMILY MEDICINE CLINIC | Age: 48
End: 2023-05-05
Payer: MEDICAID

## 2023-05-05 VITALS
BODY MASS INDEX: 26.35 KG/M2 | HEART RATE: 90 BPM | WEIGHT: 188.2 LBS | DIASTOLIC BLOOD PRESSURE: 86 MMHG | HEIGHT: 71 IN | SYSTOLIC BLOOD PRESSURE: 138 MMHG

## 2023-05-05 DIAGNOSIS — B35.9 RINGWORM: Primary | ICD-10-CM

## 2023-05-05 DIAGNOSIS — G25.81 RESTLESS LEG SYNDROME: ICD-10-CM
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
## 2023-05-05 DIAGNOSIS — Z00.00 HEALTHCARE MAINTENANCE: ICD-10-CM

## 2023-05-05 LAB
ABSOLUTE EOS #: 0.14 K/UL (ref 0–0.44)
ABSOLUTE IMMATURE GRANULOCYTE: <0.03 K/UL (ref 0–0.3)
ABSOLUTE LYMPH #: 3.1 K/UL (ref 1.1–3.7)
ABSOLUTE MONO #: 0.66 K/UL (ref 0.1–1.2)
BASOPHILS # BLD: 1 % (ref 0–2)
BASOPHILS ABSOLUTE: 0.08 K/UL (ref 0–0.2)
EOSINOPHILS RELATIVE PERCENT: 2 % (ref 1–4)
FERRITIN SERPL-MCNC: 34 NG/ML (ref 30–400)
HBA1C MFR BLD: 5 %
HCT VFR BLD AUTO: 43 % (ref 40.7–50.3)
HGB BLD-MCNC: 13.3 G/DL (ref 13–17)
IMMATURE GRANULOCYTES: 0 %
IRON SATURATION: 7 % (ref 20–55)
IRON SERPL-MCNC: 33 UG/DL (ref 59–158)
LYMPHOCYTES # BLD: 46 % (ref 24–43)
MCH RBC QN AUTO: 26 PG (ref 25.2–33.5)
MCHC RBC AUTO-ENTMCNC: 30.9 G/DL (ref 28.4–34.8)
MCV RBC AUTO: 84.1 FL (ref 82.6–102.9)
MONOCYTES # BLD: 10 % (ref 3–12)
NRBC AUTOMATED: 0 PER 100 WBC
PDW BLD-RTO: 16.8 % (ref 11.8–14.4)
PLATELET # BLD AUTO: 223 K/UL (ref 138–453)
PMV BLD AUTO: 11.2 FL (ref 8.1–13.5)
RBC # BLD: 5.11 M/UL (ref 4.21–5.77)
RBC # BLD: ABNORMAL 10*6/UL
SEG NEUTROPHILS: 41 % (ref 36–65)
SEGMENTED NEUTROPHILS ABSOLUTE COUNT: 2.77 K/UL (ref 1.5–8.1)
TIBC SERPL-MCNC: 458 UG/DL (ref 250–450)
UNSATURATED IRON BINDING CAPACITY: 425 UG/DL (ref 112–347)
WBC # BLD AUTO: 6.8 K/UL (ref 3.5–11.3)

## 2023-05-05 PROCEDURE — 83036 HEMOGLOBIN GLYCOSYLATED A1C: CPT | Performed by: STUDENT IN AN ORGANIZED HEALTH CARE EDUCATION/TRAINING PROGRAM

## 2023-05-05 RX ORDER — DIAPER,BRIEF,INFANT-TODD,DISP
EACH MISCELLANEOUS
Qty: 30 G | Refills: 1 | Status: SHIPPED | OUTPATIENT
Start: 2023-05-05 | End: 2023-05-12

## 2023-05-05 RX ORDER — CLOTRIMAZOLE AND BETAMETHASONE DIPROPIONATE 10; .64 MG/G; MG/G
CREAM TOPICAL
Qty: 2 EACH | Refills: 1 | Status: SHIPPED | OUTPATIENT
Start: 2023-05-05

## 2023-05-05 ASSESSMENT — ENCOUNTER SYMPTOMS
SHORTNESS OF BREATH: 0
NAUSEA: 0
ABDOMINAL PAIN: 0
COUGH: 0
CONSTIPATION: 0
BACK PAIN: 0
DIARRHEA: 0
CHEST TIGHTNESS: 0
COLOR CHANGE: 0
VOMITING: 0

## 2023-05-05 NOTE — PATIENT INSTRUCTIONS
Thank you for letting us take care of you today. We hope all your questions were addressed. If a question was overlooked or something else comes to mind after you return home, please contact a member of your Care Team listed below. Your Care Team at [place] [address] is Team #1  Magalie Vazquez MD (Faculty)  Kisha Dewitt MD(Resident)  Kemal Lee MD (Resident)  Winnie Walker DO (Resident)  RORY Vasquez., RORY Novak., RICK Crespo., Irasema Newton., [facility] office)  [facility], [address] (Clinical Practice Manager)  [facility], [address] [place] [address] (Clinical Pharmacist)     Office phone number: [phone]    If you need to get in right away due to illness, please be advised we have \"Same Day\" appointments available Monday-Friday. Please call us at [phone] option #3 to schedule your \"Same Day\" appointment.

## 2023-05-05 NOTE — PROGRESS NOTES
Attending Physician Statement  I have discussed the care of BrianPegorchincluding pertinent history and exam findings,  with the resident. I have reviewed the key elements of all parts of the encounter with the resident. I agree with the assessment, plan and orders as documented by the resident.   (GE Modifier)    Tinea Corporis (Ring worm)- Lotrimin cream  Multiple insects bite- generalized body- HC 1 % cream  HM- next visit
Visit Information    Have you changed or started any medications since your last visit including any over-the-counter medicines, vitamins, or herbal medicines? no   Have you stopped taking any of your medications? Is so, why? -  no  Are you having any side effects from any of your medications? - no    Have you seen any other physician or provider since your last visit?  no   Have you had any other diagnostic tests since your last visit? yes - XR  Have you been seen in the emergency room and/or had an admission in a hospital since we last saw you?  yes - [facility]   Have you had your routine dental cleaning in the past 6 months?  no     Do you have an active MyChart account? If no, what is the barrier?   No: Declines    Patient Care Team:  Brandie Monteiro MD as PCP - General (Family Medicine)    Medical History Review  Past Medical, Family, and Social History reviewed and does not contribute to the patient presenting condition    Health Maintenance   Topic Date Due    COVID-19 Vaccine (1) Never done    Hepatitis A vaccine (1 of 2 - Risk 2-dose series) Never done    Hepatitis B vaccine (1 of 3 - Risk 3-dose series) Never done    Colorectal Cancer Screen  Never done    Diabetes screen  03/24/2022    Flu vaccine (Season Ended) 08/01/2023    Depression Monitoring  10/24/2023    Lipids  03/24/2024    DTaP/Tdap/Td vaccine (2 - Td or Tdap) 05/02/2026    Hepatitis C screen  Completed    HIV screen  Completed    Hib vaccine  Aged Out    Meningococcal (ACWY) vaccine  Aged Out    Pneumococcal 0-64 years Vaccine  Aged Out
dizziness, weakness, light-headedness, numbness and headaches. The patient has a   Family History   Problem Relation Age of Onset    Hypertension Mother     Coronary Art Dis Mother     Depression Mother     Stroke Other         G.Parents    Alcohol Abuse Father        Objective:    /86 (Site: Right Upper Arm, Position: Sitting, Cuff Size: Medium Adult)   Pulse 90   Ht 5' 10.98\" (1.803 m)   Wt 188 lb 3.2 oz (85.4 kg)   BMI 26.26 kg/m²    BP Readings from Last 3 Encounters:   05/05/23 138/86   10/24/22 114/80   09/29/22 133/82       Physical Exam  Vitals and nursing note reviewed. Constitutional:       General: He is not in acute distress. Appearance: Normal appearance. He is not ill-appearing. HENT:      Head: Normocephalic and atraumatic. Mouth/Throat:      Pharynx: Oropharynx is clear. Cardiovascular:      Rate and Rhythm: Normal rate and regular rhythm. Pulses: Normal pulses. Heart sounds: No murmur heard. Pulmonary:      Effort: Pulmonary effort is normal.      Breath sounds: Normal breath sounds. Abdominal:      Palpations: Abdomen is soft. There is no mass. Tenderness: There is no abdominal tenderness. Musculoskeletal:         General: No tenderness. Neurological:      General: No focal deficit present. Mental Status: He is alert and oriented to person, place, and time.            Lab Results   Component Value Date    WBC 6.4 03/23/2019    HGB 14.8 03/23/2019    HCT 44.3 03/23/2019     03/23/2019    CHOL 235 (H) 03/24/2019    TRIG 71 03/24/2019    HDL 90 03/24/2019    ALT 30 03/23/2019    AST 65 (H) 03/23/2019     03/23/2019    K 4.1 03/23/2019     03/23/2019    CREATININE 0.67 (L) 03/23/2019    BUN 9 03/23/2019    CO2 22 03/23/2019    TSH 1.83 03/24/2019    INR 1.0 02/10/2019    LABA1C 5.0 05/05/2023     Lab Results   Component Value Date    CALCIUM 9.1 03/23/2019    PHOS 3.2 01/23/2012     Lab Results   Component Value Date
Eliecer Cervantes)  Vascular Surgery  [address] [facility], [address] [place] [address]  Phone: [phone]  Fax: [phone]  Follow Up Time:     Rodolfo Diaz (TERRELL)  Foot and Ankle Surgery; Podiatric Medicine and Surgery; [facility] Rearfoot[facility]kle Surgery  [address] [place] [address]  Phone: [phone]  Fax: [phone]  Follow Up Time:

## 2023-05-31 DIAGNOSIS — D50.9 IRON DEFICIENCY ANEMIA, UNSPECIFIED IRON DEFICIENCY ANEMIA TYPE: Primary | ICD-10-CM

## 2023-05-31 RX ORDER — FERROUS SULFATE 325(65) MG
325 TABLET ORAL
Qty: 90 TABLET | Refills: 1 | Status: SHIPPED | OUTPATIENT
Start: 2023-05-31

## 2023-05-31 NOTE — PROGRESS NOTES
Called patient to inform him regarding low iron with no answer. Iron supplements sent to patient's pharmacy and a mychart not sent to patient.

## 2023-06-20 DIAGNOSIS — G25.81 RESTLESS LEG SYNDROME: ICD-10-CM

## 2023-06-21 RX ORDER — ROPINIROLE 1 MG/1
TABLET, FILM COATED ORAL
Qty: 60 TABLET | Refills: 2 | Status: SHIPPED | OUTPATIENT
Start: 2023-06-21

## 2023-06-21 NOTE — TELEPHONE ENCOUNTER
PCP NO LONGER IN OFFICE, ROUTING TO CARE TEAM    E-scribe request for REQUIP 1 MG TAB. Please review and e-scribe if applicable.      Last Visit Date:  5/5/2023  Next Visit Date:  Visit date not found    Hemoglobin A1C (%)   Date Value   05/05/2023 5.0   03/24/2019 5.6   01/23/2012 5.2             ( goal A1C is < 7)   No results found for: LABMICR  LDL Cholesterol (mg/dL)   Date Value   03/24/2019 131 (H)       (goal LDL is <100)   AST (U/L)   Date Value   03/23/2019 65 (H)     ALT (U/L)   Date Value   03/23/2019 30     BUN (mg/dL)   Date Value   03/23/2019 9     BP Readings from Last 3 Encounters:   05/05/23 138/86   10/24/22 114/80   09/29/22 133/82          (goal 120/80)        Patient Active Problem List:     Bipolar 1 disorder, depressed, severe ([address]     Alcohol abuse     Major depressive disorder, recurrent episode, severe ([address]     Olecranon bursitis of left elbow     Left arm cellulitis     Homicidal ideation     Severe manic bipolar I disorder without psychotic features ([address]     Closed fracture of fifth metacarpal bone of right hand     Polyarthralgia     Gastroesophageal reflux disease     Bipolar 1 disorder ([address]     Essential hypertension     Restless leg syndrome     Peyronie's disease     Penile lump     Chronic low back pain without sciatica     Elevated blood pressure reading     Mild episode of recurrent major depressive disorder ([address]      ----JF

## 2023-06-27 DIAGNOSIS — K21.9 GASTROESOPHAGEAL REFLUX DISEASE WITHOUT ESOPHAGITIS: ICD-10-CM

## 2023-06-27 DIAGNOSIS — F33.0 MILD EPISODE OF RECURRENT MAJOR DEPRESSIVE DISORDER (HCC): ICD-10-CM

## 2023-06-28 RX ORDER — FLUOXETINE 10 MG/1
10 CAPSULE ORAL DAILY
Qty: 30 CAPSULE | Refills: 2 | Status: SHIPPED | OUTPATIENT
Start: 2023-06-28

## 2023-06-28 RX ORDER — OMEPRAZOLE 20 MG/1
CAPSULE, DELAYED RELEASE ORAL
Qty: 30 CAPSULE | Refills: 2 | Status: SHIPPED | OUTPATIENT
Start: 2023-06-28

## 2023-08-21 DIAGNOSIS — G25.81 RESTLESS LEG SYNDROME: ICD-10-CM

## 2023-08-21 RX ORDER — ROPINIROLE 1 MG/1
TABLET, FILM COATED ORAL
Qty: 60 TABLET | Refills: 2 | OUTPATIENT
Start: 2023-08-21

## 2023-09-07 DIAGNOSIS — G25.81 RESTLESS LEG SYNDROME: ICD-10-CM

## 2023-09-07 RX ORDER — ROPINIROLE 1 MG/1
TABLET, FILM COATED ORAL
Qty: 60 TABLET | Refills: 10 | OUTPATIENT
Start: 2023-09-07

## 2023-09-18 DIAGNOSIS — G25.81 RESTLESS LEG SYNDROME: ICD-10-CM

## 2023-09-18 RX ORDER — ROPINIROLE 1 MG/1
TABLET, FILM COATED ORAL
Qty: 60 TABLET | Refills: 10 | OUTPATIENT
Start: 2023-09-18

## 2023-09-21 DIAGNOSIS — G25.81 RESTLESS LEG SYNDROME: ICD-10-CM

## 2023-09-21 RX ORDER — ROPINIROLE 1 MG/1
TABLET, FILM COATED ORAL
Qty: 60 TABLET | Refills: 2 | Status: SHIPPED | OUTPATIENT
Start: 2023-09-21

## 2023-09-21 NOTE — TELEPHONE ENCOUNTER
Mild episode of recurrent major depressive disorder ([address])     Please review and address medication refill , if i can be an assistance be route to acceptable pool. Thank you.

## 2023-10-09 DIAGNOSIS — F33.0 MILD EPISODE OF RECURRENT MAJOR DEPRESSIVE DISORDER (HCC): ICD-10-CM

## 2023-10-09 DIAGNOSIS — K21.9 GASTROESOPHAGEAL REFLUX DISEASE WITHOUT ESOPHAGITIS: ICD-10-CM

## 2023-10-09 RX ORDER — OMEPRAZOLE 20 MG/1
CAPSULE, DELAYED RELEASE ORAL
Qty: 30 CAPSULE | Refills: 3 | Status: SHIPPED | OUTPATIENT
Start: 2023-10-09

## 2023-10-09 RX ORDER — FLUOXETINE 10 MG/1
10 CAPSULE ORAL DAILY
Qty: 30 CAPSULE | Refills: 3 | Status: SHIPPED | OUTPATIENT
Start: 2023-10-09

## 2023-10-09 NOTE — TELEPHONE ENCOUNTER
Last visit: 5/5/23  Last Med refill: 6/28/23  Does patient have enough medication for 72 hours: No:     Next Visit Date:  No future appointments.     Health Maintenance   Topic Date Due    Hepatitis B vaccine (1 of 3 - 3-dose series) Never done    COVID-19 Vaccine (1) Never done    Hepatitis A vaccine (1 of 2 - Risk 2-dose series) Never done    Colorectal Cancer Screen  Never done    Flu vaccine (1) 08/01/2023    Depression Monitoring  10/24/2023    Lipids  03/24/2024    DTaP/Tdap/Td vaccine (2 - Td or Tdap) 05/02/2026    Diabetes screen  05/05/2026    Hepatitis C screen  Completed    HIV screen  Completed    Hib vaccine  Aged Out    Meningococcal (ACWY) vaccine  Aged Out    Pneumococcal 0-64 years Vaccine  Aged Out       Hemoglobin A1C (%)   Date Value   05/05/2023 5.0   03/24/2019 5.6   01/23/2012 5.2             ( goal A1C is < 7)   No components found for: \"LABMICR\"  LDL Cholesterol (mg/dL)   Date Value   03/24/2019 131 (H)   04/10/2017 115       (goal LDL is <100)   AST (U/L)   Date Value   03/23/2019 65 (H)     ALT (U/L)   Date Value   03/23/2019 30     BUN (mg/dL)   Date Value   03/23/2019 9     BP Readings from Last 3 Encounters:   05/05/23 138/86   10/24/22 114/80   09/29/22 133/82          (goal 120/80)    All Future Testing planned in [facility]  Lab Frequency Next Occurrence               Patient Active Problem List:     Bipolar 1 disorder, depressed, severe ([address])     Alcohol abuse     Major depressive disorder, recurrent episode, severe ([address])     Olecranon bursitis of left elbow     Left arm cellulitis     Homicidal ideation     Severe manic bipolar I disorder without psychotic features ([address])     Closed fracture of fifth metacarpal bone of right hand     Polyarthralgia     Gastroesophageal reflux disease     Bipolar 1 disorder ([address])     Essential hypertension     Restless leg syndrome     Peyronie's disease     Penile lump     Chronic low back pain without sciatica     Elevated blood pressure reading

## 2023-12-23 DIAGNOSIS — F33.0 MILD EPISODE OF RECURRENT MAJOR DEPRESSIVE DISORDER (HCC): ICD-10-CM

## 2023-12-23 DIAGNOSIS — K21.9 GASTROESOPHAGEAL REFLUX DISEASE WITHOUT ESOPHAGITIS: ICD-10-CM

## 2023-12-26 RX ORDER — FLUOXETINE 10 MG/1
10 CAPSULE ORAL DAILY
Qty: 30 CAPSULE | Refills: 0 | Status: SHIPPED | OUTPATIENT
Start: 2023-12-26

## 2023-12-26 RX ORDER — OMEPRAZOLE 20 MG/1
CAPSULE, DELAYED RELEASE ORAL
Qty: 30 CAPSULE | Refills: 0 | Status: SHIPPED | OUTPATIENT
Start: 2023-12-26

## 2023-12-26 NOTE — TELEPHONE ENCOUNTER
Last visit: 5/5/223  Last Med refill: 10/9/23  Does patient have enough medication for 72 hours: No: Tried to leave vm to schedule appt and wasn't able to    Next Visit Date:  No future appointments.    Health Maintenance   Topic Date Due    Hepatitis B vaccine (1 of 3 - 3-dose series) Never done    COVID-19 Vaccine (1) Never done    Hepatitis A vaccine (1 of 2 - Risk 2-dose series) Never done    Colorectal Cancer Screen  Never done    Flu vaccine (1) 08/01/2023    Depression Monitoring  10/24/2023    Lipids  03/24/2024    DTaP/Tdap/Td vaccine (2 - Td or Tdap) 05/02/2026    Diabetes screen  05/05/2026    Hepatitis C screen  Completed    HIV screen  Completed    Hib vaccine  Aged Out    Polio vaccine  Aged Out    Meningococcal (ACWY) vaccine  Aged Out    Pneumococcal 0-64 years Vaccine  Aged Out       Hemoglobin A1C (%)   Date Value   05/05/2023 5.0   03/24/2019 5.6   01/23/2012 5.2             ( goal A1C is < 7)   No components found for: \"LABMICR\"  LDL Cholesterol (mg/dL)   Date Value   03/24/2019 131 (H)   04/10/2017 115       (goal LDL is <100)   AST (U/L)   Date Value   03/23/2019 65 (H)     ALT (U/L)   Date Value   03/23/2019 30     BUN (mg/dL)   Date Value   03/23/2019 9     BP Readings from Last 3 Encounters:   05/05/23 138/86   10/24/22 114/80   09/29/22 133/82          (goal 120/80)    All Future Testing planned in [facility]  Lab Frequency Next Occurrence               Patient Active Problem List:     Bipolar 1 disorder, depressed, severe (HCC)     Alcohol abuse     Major depressive disorder, recurrent episode, severe (HCC)     Olecranon bursitis of left elbow     Left arm cellulitis     Homicidal ideation     Severe manic bipolar I disorder without psychotic features ([facility])     Closed fracture of fifth metacarpal bone of right hand     Polyarthralgia     Gastroesophageal reflux disease     Bipolar 1 disorder (HCC)     Essential hypertension     Restless leg syndrome     Peyronie's disease     Penile lump

## 2024-02-12 DIAGNOSIS — F33.0 MILD EPISODE OF RECURRENT MAJOR DEPRESSIVE DISORDER (HCC): ICD-10-CM

## 2024-02-12 DIAGNOSIS — K21.9 GASTROESOPHAGEAL REFLUX DISEASE WITHOUT ESOPHAGITIS: ICD-10-CM

## 2024-02-12 RX ORDER — FLUOXETINE 10 MG/1
10 CAPSULE ORAL DAILY
Qty: 30 CAPSULE | Refills: 10 | OUTPATIENT
Start: 2024-02-12

## 2024-02-12 RX ORDER — OMEPRAZOLE 20 MG/1
CAPSULE, DELAYED RELEASE ORAL
Qty: 30 CAPSULE | Refills: 10 | OUTPATIENT
Start: 2024-02-12

## 2024-02-16 DIAGNOSIS — F33.0 MILD EPISODE OF RECURRENT MAJOR DEPRESSIVE DISORDER (HCC): ICD-10-CM

## 2024-02-16 DIAGNOSIS — K21.9 GASTROESOPHAGEAL REFLUX DISEASE WITHOUT ESOPHAGITIS: ICD-10-CM

## 2024-02-16 RX ORDER — FLUOXETINE 10 MG/1
10 CAPSULE ORAL DAILY
Qty: 30 CAPSULE | Refills: 0 | OUTPATIENT
Start: 2024-02-16

## 2024-02-16 RX ORDER — OMEPRAZOLE 20 MG/1
CAPSULE, DELAYED RELEASE ORAL
Qty: 30 CAPSULE | Refills: 1 | OUTPATIENT
Start: 2024-02-16

## 2024-02-28 DIAGNOSIS — F33.0 MILD EPISODE OF RECURRENT MAJOR DEPRESSIVE DISORDER (HCC): ICD-10-CM

## 2024-02-28 DIAGNOSIS — K21.9 GASTROESOPHAGEAL REFLUX DISEASE WITHOUT ESOPHAGITIS: ICD-10-CM

## 2024-02-29 ENCOUNTER — TELEPHONE (OUTPATIENT)
Dept: FAMILY MEDICINE CLINIC | Age: 49
End: 2024-02-29

## 2024-02-29 RX ORDER — OMEPRAZOLE 20 MG/1
CAPSULE, DELAYED RELEASE ORAL
Qty: 30 CAPSULE | Refills: 10 | OUTPATIENT
Start: 2024-02-29

## 2024-02-29 RX ORDER — FLUOXETINE 10 MG/1
10 CAPSULE ORAL DAILY
Qty: 30 CAPSULE | Refills: 10 | OUTPATIENT
Start: 2024-02-29

## 2024-02-29 NOTE — TELEPHONE ENCOUNTER
Writer called the patient no answer left message to call office to make himself an appointment for check up, refill

## 2025-04-01 ENCOUNTER — HOSPITAL ENCOUNTER (EMERGENCY)
Facility: CLINIC | Age: 50
Discharge: HOME | End: 2025-04-01
Attending: EMERGENCY MEDICINE
Payer: MEDICAID

## 2025-04-01 VITALS
HEIGHT: 71 IN | BODY MASS INDEX: 27.3 KG/M2 | HEART RATE: 97 BPM | OXYGEN SATURATION: 96 % | WEIGHT: 195 LBS | TEMPERATURE: 98.2 F | DIASTOLIC BLOOD PRESSURE: 101 MMHG | RESPIRATION RATE: 14 BRPM | SYSTOLIC BLOOD PRESSURE: 170 MMHG

## 2025-04-01 DIAGNOSIS — S76.311A HAMSTRING STRAIN, RIGHT, INITIAL ENCOUNTER: Primary | ICD-10-CM

## 2025-04-01 PROCEDURE — 96372 THER/PROPH/DIAG INJ SC/IM: CPT

## 2025-04-01 PROCEDURE — 99284 EMERGENCY DEPT VISIT MOD MDM: CPT

## 2025-04-01 PROCEDURE — 6360000002 HC RX W HCPCS: Performed by: NURSE PRACTITIONER

## 2025-04-01 RX ORDER — IBUPROFEN 800 MG/1
800 TABLET, FILM COATED ORAL EVERY 8 HOURS PRN
Qty: 30 TABLET | Refills: 0 | Status: SHIPPED | OUTPATIENT
Start: 2025-04-01

## 2025-04-01 RX ORDER — KETOROLAC TROMETHAMINE 30 MG/ML
30 INJECTION, SOLUTION INTRAMUSCULAR; INTRAVENOUS ONCE
Status: COMPLETED | OUTPATIENT
Start: 2025-04-01 | End: 2025-04-01

## 2025-04-01 RX ADMIN — KETOROLAC TROMETHAMINE 30 MG: 30 INJECTION, SOLUTION INTRAMUSCULAR at 14:19

## 2025-04-01 ASSESSMENT — PAIN SCALES - GENERAL: PAINLEVEL_OUTOF10: 6

## 2025-04-01 ASSESSMENT — PAIN - FUNCTIONAL ASSESSMENT: PAIN_FUNCTIONAL_ASSESSMENT: 0-10

## 2025-04-01 ASSESSMENT — LIFESTYLE VARIABLES
HOW OFTEN DO YOU HAVE A DRINK CONTAINING ALCOHOL: NEVER
HOW MANY STANDARD DRINKS CONTAINING ALCOHOL DO YOU HAVE ON A TYPICAL DAY: PATIENT DOES NOT DRINK

## 2025-04-01 ASSESSMENT — PAIN DESCRIPTION - ORIENTATION: ORIENTATION: RIGHT;POSTERIOR;UPPER

## 2025-04-01 ASSESSMENT — PAIN DESCRIPTION - LOCATION: LOCATION: LEG

## 2025-04-01 ASSESSMENT — PAIN DESCRIPTION - DESCRIPTORS: DESCRIPTORS: ACHING
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
## 2025-04-01 NOTE — DISCHARGE INSTRUCTIONS
It is important for his care with a family doctor.  If your symptoms persist you may need physical therapy or referral to an orthopedic doctor.  Please take your blood pressure medication when you get home.  The Motrin is 3 times a day with food.  It is safe to stay active as this will help stretch out your muscle.  If symptoms worsen or new concerns develop return to the emergency room

## 2025-04-14 ENCOUNTER — OFFICE VISIT (OUTPATIENT)
Dept: FAMILY MEDICINE CLINIC | Age: 50
End: 2025-04-14
Payer: MEDICAID

## 2025-04-14 VITALS
OXYGEN SATURATION: 97 % | HEART RATE: 93 BPM | DIASTOLIC BLOOD PRESSURE: 86 MMHG | HEIGHT: 71 IN | WEIGHT: 194 LBS | BODY MASS INDEX: 27.16 KG/M2 | SYSTOLIC BLOOD PRESSURE: 141 MMHG

## 2025-04-14 DIAGNOSIS — F41.9 ANXIETY: ICD-10-CM

## 2025-04-14 DIAGNOSIS — I10 ESSENTIAL HYPERTENSION: ICD-10-CM

## 2025-04-14 DIAGNOSIS — Z13.220 SCREENING, LIPID: ICD-10-CM

## 2025-04-14 DIAGNOSIS — S76.311A STRAIN OF RIGHT HAMSTRING MUSCLE, INITIAL ENCOUNTER: Primary | ICD-10-CM

## 2025-04-14 DIAGNOSIS — K21.9 GASTROESOPHAGEAL REFLUX DISEASE WITHOUT ESOPHAGITIS: ICD-10-CM

## 2025-04-14 DIAGNOSIS — Z13.228 SCREENING FOR METABOLIC DISORDER: ICD-10-CM

## 2025-04-14 DIAGNOSIS — G25.81 RESTLESS LEG SYNDROME: ICD-10-CM

## 2025-04-14 PROCEDURE — 99214 OFFICE O/P EST MOD 30 MIN: CPT | Performed by: FAMILY MEDICINE

## 2025-04-14 PROCEDURE — 3079F DIAST BP 80-89 MM HG: CPT | Performed by: FAMILY MEDICINE

## 2025-04-14 PROCEDURE — 3077F SYST BP >= 140 MM HG: CPT | Performed by: FAMILY MEDICINE

## 2025-04-14 RX ORDER — QUETIAPINE FUMARATE 50 MG/1
TABLET, FILM COATED ORAL
COMMUNITY
Start: 2025-01-10 | End: 2025-04-14 | Stop reason: SDUPTHER

## 2025-04-14 RX ORDER — QUETIAPINE FUMARATE 50 MG/1
50 TABLET, FILM COATED ORAL NIGHTLY
Qty: 90 TABLET | Refills: 3 | Status: SHIPPED | OUTPATIENT
Start: 2025-04-14

## 2025-04-14 RX ORDER — MELOXICAM 7.5 MG/1
7.5 TABLET ORAL DAILY
COMMUNITY
Start: 2025-03-31 | End: 2025-04-14

## 2025-04-14 RX ORDER — CYCLOBENZAPRINE HCL 10 MG
10 TABLET ORAL 3 TIMES DAILY PRN
Qty: 30 TABLET | Refills: 0 | Status: SHIPPED | OUTPATIENT
Start: 2025-04-14 | End: 2025-04-24

## 2025-04-14 RX ORDER — ROPINIROLE 1 MG/1
3 TABLET, FILM COATED ORAL NIGHTLY
Qty: 270 TABLET | Refills: 3 | Status: SHIPPED | OUTPATIENT
Start: 2025-04-14

## 2025-04-14 RX ORDER — NALTREXONE HYDROCHLORIDE 50 MG/1
50 TABLET, FILM COATED ORAL DAILY
COMMUNITY
Start: 2025-01-20 | End: 2025-04-14

## 2025-04-14 RX ORDER — OMEPRAZOLE 20 MG/1
CAPSULE, DELAYED RELEASE ORAL
Qty: 90 CAPSULE | Refills: 3 | Status: SHIPPED | OUTPATIENT
Start: 2025-04-14

## 2025-04-14 RX ORDER — DIVALPROEX SODIUM 500 MG/1
500 TABLET, FILM COATED, EXTENDED RELEASE ORAL DAILY
COMMUNITY
Start: 2025-03-31 | End: 2025-04-14

## 2025-04-14 RX ORDER — NALTREXONE 380 MG
KIT INTRAMUSCULAR
COMMUNITY
Start: 2025-01-17

## 2025-04-14 RX ORDER — DULOXETIN HYDROCHLORIDE 30 MG/1
30 CAPSULE, DELAYED RELEASE ORAL DAILY
COMMUNITY
Start: 2025-01-10 | End: 2025-04-14

## 2025-04-14 SDOH — ECONOMIC STABILITY: FOOD INSECURITY: WITHIN THE PAST 12 MONTHS, YOU WORRIED THAT YOUR FOOD WOULD RUN OUT BEFORE YOU GOT MONEY TO BUY MORE.: NEVER TRUE

## 2025-04-14 SDOH — ECONOMIC STABILITY: FOOD INSECURITY: WITHIN THE PAST 12 MONTHS, THE FOOD YOU BOUGHT JUST DIDN'T LAST AND YOU DIDN'T HAVE MONEY TO GET MORE.: NEVER TRUE

## 2025-04-14 ASSESSMENT — PATIENT HEALTH QUESTIONNAIRE - PHQ9
3. TROUBLE FALLING OR STAYING ASLEEP: NEARLY EVERY DAY
SUM OF ALL RESPONSES TO PHQ QUESTIONS 1-9: 3
5. POOR APPETITE OR OVEREATING: NOT AT ALL
SUM OF ALL RESPONSES TO PHQ QUESTIONS 1-9: 3
4. FEELING TIRED OR HAVING LITTLE ENERGY: NOT AT ALL
8. MOVING OR SPEAKING SO SLOWLY THAT OTHER PEOPLE COULD HAVE NOTICED. OR THE OPPOSITE, BEING SO FIGETY OR RESTLESS THAT YOU HAVE BEEN MOVING AROUND A LOT MORE THAN USUAL: NOT AT ALL
SUM OF ALL RESPONSES TO PHQ QUESTIONS 1-9: 3
SUM OF ALL RESPONSES TO PHQ QUESTIONS 1-9: 3
6. FEELING BAD ABOUT YOURSELF - OR THAT YOU ARE A FAILURE OR HAVE LET YOURSELF OR YOUR FAMILY DOWN: NOT AT ALL
7. TROUBLE CONCENTRATING ON THINGS, SUCH AS READING THE NEWSPAPER OR WATCHING TELEVISION: NOT AT ALL
9. THOUGHTS THAT YOU WOULD BE BETTER OFF DEAD, OR OF HURTING YOURSELF: NOT AT ALL
10. IF YOU CHECKED OFF ANY PROBLEMS, HOW DIFFICULT HAVE THESE PROBLEMS MADE IT FOR YOU TO DO YOUR WORK, TAKE CARE OF THINGS AT HOME, OR GET ALONG WITH OTHER PEOPLE: NOT DIFFICULT AT ALL
2. FEELING DOWN, DEPRESSED OR HOPELESS: NOT AT ALL

## 2025-04-14 NOTE — PATIENT INSTRUCTIONS
[facility]  Address: [address], [place] [address]  Phone: [phone]

## 2025-04-14 NOTE — ASSESSMENT & PLAN NOTE
Orders:    omeprazole (PRILOSEC) 20 MG delayed release capsule; TAKE ONE CAPSULE BY MOUTH EVERY DAY

## 2025-04-14 NOTE — ASSESSMENT & PLAN NOTE
Orders:    CBC with Auto Differential; Future    rOPINIRole (REQUIP) 1 MG tablet; Take 3 tablets by mouth nightly    Vitamin B12 & Folate; Future    TSH; Future    T4, Free; Future    Magnesium; Future

## 2025-04-22 ENCOUNTER — TELEPHONE (OUTPATIENT)
Dept: FAMILY MEDICINE CLINIC | Age: 50
End: 2025-04-22

## 2025-04-22 NOTE — TELEPHONE ENCOUNTER
----- Message from Dr. Annie Kirby DO sent at 4/21/2025  5:07 PM EDT -----  Please request colonoscopy results from [facility] gastroenterology. Thank you

## 2025-04-22 NOTE — PROGRESS NOTES
Colonoscopy report requested from [facility]  
04/10/2017 01:48 PM    CHOL 284 01/13/2017 07:23 AM    TRIG 71 03/24/2019 08:19 AM    TRIG 309 04/10/2017 01:48 PM    TRIG 108 01/13/2017 07:23 AM    HDL 90 03/24/2019 08:19 AM    HDL 36 04/10/2017 01:48 PM    HDL 66 01/13/2017 07:23 AM    GLUCOSE 95 03/23/2019 01:20 AM    GLUCOSE 89 01/25/2012 04:18 AM    LABA1C 5.0 05/05/2023 02:12 PM    LABA1C 5.6 03/24/2019 08:19 AM    LABA1C 5.2 01/23/2012 09:54 AM       The ASCVD Risk score (Frankie MCKEON, et al., 2019) failed to calculate for the following reasons:    Cannot find a previous HDL lab    Cannot find a previous total cholesterol lab        Assessment & Plan  Strain of right hamstring muscle, initial encounter   Check Xrays, trial of muscle relaxer    Orders:    cyclobenzaprine (FLEXERIL) 10 MG tablet; Take 1 tablet by mouth 3 times daily as needed for Muscle spasms    XR KNEE LEFT (3 VIEWS); Future    XR FEMUR RIGHT (MIN 2 VIEWS); Future    Gastroesophageal reflux disease without esophagitis     Orders:    omeprazole (PRILOSEC) 20 MG delayed release capsule; TAKE ONE CAPSULE BY MOUTH EVERY DAY    Restless leg syndrome     Orders:    CBC with Auto Differential; Future    rOPINIRole (REQUIP) 1 MG tablet; Take 3 tablets by mouth nightly    Vitamin B12 & Folate; Future    TSH; Future    T4, Free; Future    Magnesium; Future    Essential hypertension   Above goal <140/90. Check upon follow up appointment         Screening, lipid     Orders:    Lipid Panel; Future    Screening for metabolic disorder     Orders:    Comprehensive Metabolic Panel; Future    Hemoglobin A1C; Future    Anxiety     Orders:    QUEtiapine (SEROQUEL) 50 MG tablet; Take 1 tablet by mouth at bedtime      Return in about 1 month (around 5/14/2025).           --Annie Kirby DO

## 2025-05-14 ENCOUNTER — OFFICE VISIT (OUTPATIENT)
Dept: FAMILY MEDICINE CLINIC | Age: 50
End: 2025-05-14
Payer: MEDICAID

## 2025-05-14 VITALS
WEIGHT: 196 LBS | DIASTOLIC BLOOD PRESSURE: 100 MMHG | HEIGHT: 71 IN | SYSTOLIC BLOOD PRESSURE: 154 MMHG | OXYGEN SATURATION: 97 % | BODY MASS INDEX: 27.44 KG/M2 | HEART RATE: 88 BPM

## 2025-05-14 DIAGNOSIS — I10 ESSENTIAL HYPERTENSION: Primary | ICD-10-CM

## 2025-05-14 PROCEDURE — 99213 OFFICE O/P EST LOW 20 MIN: CPT | Performed by: FAMILY MEDICINE

## 2025-05-14 PROCEDURE — 3080F DIAST BP >= 90 MM HG: CPT | Performed by: FAMILY MEDICINE

## 2025-05-14 PROCEDURE — 3077F SYST BP >= 140 MM HG: CPT | Performed by: FAMILY MEDICINE

## 2025-05-14 RX ORDER — MELOXICAM 7.5 MG/1
7.5 TABLET ORAL DAILY
COMMUNITY
Start: 2025-04-28

## 2025-05-14 RX ORDER — DIVALPROEX SODIUM 500 MG/1
500 TABLET, FILM COATED, EXTENDED RELEASE ORAL DAILY
COMMUNITY
Start: 2025-04-28

## 2025-05-14 RX ORDER — VALSARTAN AND HYDROCHLOROTHIAZIDE 80; 12.5 MG/1; MG/1
1 TABLET, FILM COATED ORAL DAILY
Qty: 90 TABLET | Refills: 3 | Status: SHIPPED | OUTPATIENT
Start: 2025-05-14

## 2025-05-14 SDOH — ECONOMIC STABILITY: FOOD INSECURITY: WITHIN THE PAST 12 MONTHS, THE FOOD YOU BOUGHT JUST DIDN'T LAST AND YOU DIDN'T HAVE MONEY TO GET MORE.: NEVER TRUE

## 2025-05-14 SDOH — ECONOMIC STABILITY: FOOD INSECURITY: WITHIN THE PAST 12 MONTHS, YOU WORRIED THAT YOUR FOOD WOULD RUN OUT BEFORE YOU GOT MONEY TO BUY MORE.: NEVER TRUE

## 2025-05-14 ASSESSMENT — PATIENT HEALTH QUESTIONNAIRE - PHQ9
SUM OF ALL RESPONSES TO PHQ QUESTIONS 1-9: 0
1. LITTLE INTEREST OR PLEASURE IN DOING THINGS: NOT AT ALL
SUM OF ALL RESPONSES TO PHQ QUESTIONS 1-9: 0
2. FEELING DOWN, DEPRESSED OR HOPELESS: NOT AT ALL
SUM OF ALL RESPONSES TO PHQ QUESTIONS 1-9: 0
SUM OF ALL RESPONSES TO PHQ QUESTIONS 1-9: 0

## 2025-05-14 NOTE — PROGRESS NOTES
2025    Scar Rodas (:  1975) is a 50 y.o. male, here to follow up on HTN, leg pain    He tells me that his leg has been doing quite a bit better and hasn't needed to get the xrays done  He remembers that he used to be on diovan-HCTZ    He has a history of alcohol use on Vivitrol  He has a history of RLS, on Requip  He has a history of Anxiety and Depression, on seroquel  He has a history of GERD, on omeprazole    He tells me that he had a colonoscopy done a few months ago, all normal. [facility].     Subjective   Patient Active Problem List   Diagnosis    Bipolar 1 disorder, depressed, severe (HCC)    Alcohol abuse    Major depressive disorder, recurrent episode, severe (HCC)    Olecranon bursitis of left elbow    Left arm cellulitis    Homicidal ideation    Severe manic bipolar I disorder without psychotic features (HCC)    Closed fracture of fifth metacarpal bone of right hand    Polyarthralgia    Gastroesophageal reflux disease    Bipolar 1 disorder (HCC)    Essential hypertension    Restless leg syndrome    Peyronie's disease    Penile lump    Chronic low back pain without sciatica    Elevated blood pressure reading    Mild episode of recurrent major depressive disorder       Review of Systems  Except as noted in the HPI, a 10 point ROS was negative     Prior to Visit Medications    Medication Sig Taking? Authorizing Provider   divalproex (DEPAKOTE ER) 500 MG extended release tablet Take 1 tablet by mouth daily Yes Francesca Mari MD   meloxicam (MOBIC) 7.5 MG tablet Take 1 tablet by mouth daily Yes Francesca Mari MD   VIVITROL 380 MG injection INJECT INTRAMUSCULARY EVERY 4 WEEKS Yes Francesca Mari MD   omeprazole (PRILOSEC) 20 MG delayed release capsule TAKE ONE CAPSULE BY MOUTH EVERY DAY Yes Annie Kirby DO   QUEtiapine (SEROQUEL) 50 MG tablet Take 1 tablet by mouth at bedtime Yes Annie Kirby DO   rOPINIRole (REQUIP) 1 MG tablet Take 3 tablets

## 2025-05-14 NOTE — ASSESSMENT & PLAN NOTE
Resume anti-hypertensives    Orders:    valsartan-hydroCHLOROthiazide (DIOVAN-HCT) 80-12.5 MG per tablet; Take 1 tablet by mouth daily